# Patient Record
Sex: MALE | Race: BLACK OR AFRICAN AMERICAN | NOT HISPANIC OR LATINO | Employment: PART TIME | URBAN - METROPOLITAN AREA
[De-identification: names, ages, dates, MRNs, and addresses within clinical notes are randomized per-mention and may not be internally consistent; named-entity substitution may affect disease eponyms.]

---

## 2017-01-27 ENCOUNTER — LAB VISIT (OUTPATIENT)
Dept: LAB | Facility: HOSPITAL | Age: 67
End: 2017-01-27
Attending: FAMILY MEDICINE
Payer: COMMERCIAL

## 2017-01-27 DIAGNOSIS — E78.2 MIXED HYPERLIPIDEMIA: ICD-10-CM

## 2017-01-27 LAB
CHOLEST/HDLC SERPL: 6.1 {RATIO}
HDL/CHOLESTEROL RATIO: 16.5 %
HDLC SERPL-MCNC: 200 MG/DL
HDLC SERPL-MCNC: 33 MG/DL
LDLC SERPL CALC-MCNC: 114.2 MG/DL
NONHDLC SERPL-MCNC: 167 MG/DL
TRIGL SERPL-MCNC: 264 MG/DL

## 2017-01-27 PROCEDURE — 36415 COLL VENOUS BLD VENIPUNCTURE: CPT | Mod: PO

## 2017-01-27 PROCEDURE — 80061 LIPID PANEL: CPT

## 2017-02-08 ENCOUNTER — TELEPHONE (OUTPATIENT)
Dept: FAMILY MEDICINE | Facility: CLINIC | Age: 67
End: 2017-02-08

## 2017-02-08 DIAGNOSIS — M10.00 ACUTE IDIOPATHIC GOUT, UNSPECIFIED SITE: Primary | ICD-10-CM

## 2017-02-08 NOTE — TELEPHONE ENCOUNTER
----- Message from Sameera Joseph sent at 2/8/2017  3:55 PM CST -----  Contact: Wife  Diana, wife [phone] or  Shree  [phone], Calling for patient about his gout. Patient has taken his medication for over 3 weeks with no relief in his feet by his big toes and is calling for advise on see possibly a specialist. Please advise. Thanks

## 2017-02-09 RX ORDER — METHYLPREDNISOLONE 4 MG/1
TABLET ORAL
Qty: 21 TABLET | Refills: 0 | Status: SHIPPED | OUTPATIENT
Start: 2017-02-09 | End: 2018-02-01

## 2017-03-30 ENCOUNTER — TELEPHONE (OUTPATIENT)
Dept: FAMILY MEDICINE | Facility: CLINIC | Age: 67
End: 2017-03-30

## 2017-03-30 NOTE — TELEPHONE ENCOUNTER
Wife states that she's had some chest pain the other night and has a few episodes since.     Informed her I would have the nurse call her back as she would be able to get more accurate and relevant information from her.

## 2017-03-30 NOTE — TELEPHONE ENCOUNTER
----- Message from Gemini Dumont sent at 3/30/2017 10:12 AM CDT -----  Contact: [phone]  Wife Diana called stated that she's returning a call/pls call back at [phone]

## 2017-03-30 NOTE — TELEPHONE ENCOUNTER
----- Message from Rossy Banerjee sent at 3/30/2017  9:49 AM CDT -----  Contact: wife- Diana - [phone]  Patient's wife asking for recommendation for patient to see cardiologist.Thanks!

## 2017-03-30 NOTE — TELEPHONE ENCOUNTER
Patient's wife called stating her  has been complaining of chest pains often.  Patient was seen in the office for TIA in 2013.  Advised patient's wife he needs to go to the ER for further evaluation.  Mrs. Heredia agreed and states she will let him know.

## 2017-05-15 DIAGNOSIS — E78.5 HYPERLIPIDEMIA: ICD-10-CM

## 2017-05-15 RX ORDER — OMEGA-3-ACID ETHYL ESTERS 1 G/1
CAPSULE, LIQUID FILLED ORAL
Qty: 360 CAPSULE | Refills: 3 | Status: SHIPPED | OUTPATIENT
Start: 2017-05-15 | End: 2018-05-17 | Stop reason: SDUPTHER

## 2017-05-15 RX ORDER — EZETIMIBE 10 MG/1
TABLET ORAL
Qty: 90 TABLET | Refills: 3 | Status: SHIPPED | OUTPATIENT
Start: 2017-05-15 | End: 2018-05-17 | Stop reason: SDUPTHER

## 2017-07-26 DIAGNOSIS — M10.00 ACUTE IDIOPATHIC GOUT, UNSPECIFIED SITE: Primary | ICD-10-CM

## 2017-07-27 ENCOUNTER — LAB VISIT (OUTPATIENT)
Dept: LAB | Facility: HOSPITAL | Age: 67
End: 2017-07-27
Attending: FAMILY MEDICINE
Payer: COMMERCIAL

## 2017-07-27 DIAGNOSIS — M10.00 ACUTE IDIOPATHIC GOUT, UNSPECIFIED SITE: ICD-10-CM

## 2017-07-27 LAB — URATE SERPL-MCNC: 9 MG/DL

## 2017-07-27 PROCEDURE — 84550 ASSAY OF BLOOD/URIC ACID: CPT

## 2017-07-27 PROCEDURE — 36415 COLL VENOUS BLD VENIPUNCTURE: CPT | Mod: PO

## 2017-07-27 RX ORDER — COLCHICINE 0.6 MG/1
TABLET, FILM COATED ORAL
Qty: 30 TABLET | Refills: 2 | Status: SHIPPED | OUTPATIENT
Start: 2017-07-27 | End: 2017-11-06 | Stop reason: SDUPTHER

## 2017-07-28 ENCOUNTER — TELEPHONE (OUTPATIENT)
Dept: FAMILY MEDICINE | Facility: CLINIC | Age: 67
End: 2017-07-28

## 2017-07-28 DIAGNOSIS — E79.0 HYPERURICEMIA: ICD-10-CM

## 2017-07-28 DIAGNOSIS — M10.00 ACUTE IDIOPATHIC GOUT, UNSPECIFIED SITE: Primary | ICD-10-CM

## 2017-07-28 RX ORDER — ALLOPURINOL 100 MG/1
100 TABLET ORAL DAILY
Qty: 30 TABLET | Refills: 6 | Status: SHIPPED | OUTPATIENT
Start: 2017-07-28 | End: 2017-09-13 | Stop reason: SDUPTHER

## 2017-07-28 NOTE — TELEPHONE ENCOUNTER
Patient notified of lab results and to start allopurinol. Patient voiced understanding and is schedule for repeat labs in 6 weeks.

## 2017-07-28 NOTE — TELEPHONE ENCOUNTER
----- Message from Brendan Frank Jr., MD sent at 7/28/2017  7:09 AM CDT -----  Uric acid elevated to 9.0. Patient needs a medication to take daily to decrease uric acid, and colcrys is more for acute flare-ups. Recommend allopurinol daily to decrease uric acid. Recommend starting at 100 mg daily then check uric acid again in 6-8 weeks.

## 2017-08-29 RX ORDER — INDOMETHACIN 25 MG/1
CAPSULE ORAL
Qty: 30 CAPSULE | Refills: 2 | Status: SHIPPED | OUTPATIENT
Start: 2017-08-29 | End: 2022-08-02

## 2017-09-08 ENCOUNTER — LAB VISIT (OUTPATIENT)
Dept: LAB | Facility: HOSPITAL | Age: 67
End: 2017-09-08
Attending: FAMILY MEDICINE
Payer: MEDICARE

## 2017-09-08 DIAGNOSIS — M10.00 ACUTE IDIOPATHIC GOUT, UNSPECIFIED SITE: ICD-10-CM

## 2017-09-08 LAB — URATE SERPL-MCNC: 7.1 MG/DL

## 2017-09-08 PROCEDURE — 84550 ASSAY OF BLOOD/URIC ACID: CPT

## 2017-09-08 PROCEDURE — 36415 COLL VENOUS BLD VENIPUNCTURE: CPT | Mod: PO

## 2017-09-13 DIAGNOSIS — E79.0 HYPERURICEMIA: Primary | ICD-10-CM

## 2017-09-13 RX ORDER — ALLOPURINOL 100 MG/1
200 TABLET ORAL DAILY
Qty: 60 TABLET | Refills: 6 | Status: SHIPPED | OUTPATIENT
Start: 2017-09-13 | End: 2018-02-20 | Stop reason: SDUPTHER

## 2017-09-14 ENCOUNTER — TELEPHONE (OUTPATIENT)
Dept: FAMILY MEDICINE | Facility: CLINIC | Age: 67
End: 2017-09-14

## 2017-09-14 NOTE — TELEPHONE ENCOUNTER
----- Message from Brendan Frank Jr., MD sent at 9/13/2017  6:24 PM CDT -----  Uric acid improved with allopurinol 100 mg daily, but still slightly elevated at 7.1 (normal 3.4-7.0). Increase allopurinol to 200 mg daily and recheck uric acid before next visit. New rx sent to pharmacy.

## 2017-09-14 NOTE — TELEPHONE ENCOUNTER
Results given to patient.  Informed patient of the medication dosage change.  Patient will call back in Jan to schedule his lab

## 2017-11-06 DIAGNOSIS — M10.00 ACUTE IDIOPATHIC GOUT, UNSPECIFIED SITE: ICD-10-CM

## 2017-11-07 RX ORDER — COLCHICINE 0.6 MG/1
0.6 TABLET ORAL DAILY
Qty: 90 TABLET | Refills: 3 | Status: SHIPPED | OUTPATIENT
Start: 2017-11-07 | End: 2018-11-20 | Stop reason: SDUPTHER

## 2018-01-30 ENCOUNTER — DOCUMENTATION ONLY (OUTPATIENT)
Dept: FAMILY MEDICINE | Facility: CLINIC | Age: 68
End: 2018-01-30

## 2018-01-30 NOTE — PROGRESS NOTES
Pre-Visit Chart Review  For Appointment Scheduled on (date) 2/1/18    Health Maintenance Due   Topic Date Due    TETANUS VACCINE  03/20/1970    Zoster Vaccine  03/20/2012    Pneumococcal (65+) (1 of 2 - PCV13) 03/20/2017    Abdominal Aortic Aneurysm Screening  03/20/2017    Influenza Vaccine  08/01/2017

## 2018-02-01 ENCOUNTER — LAB VISIT (OUTPATIENT)
Dept: LAB | Facility: HOSPITAL | Age: 68
End: 2018-02-01
Attending: FAMILY MEDICINE
Payer: MEDICARE

## 2018-02-01 ENCOUNTER — OFFICE VISIT (OUTPATIENT)
Dept: FAMILY MEDICINE | Facility: CLINIC | Age: 68
End: 2018-02-01
Payer: MEDICARE

## 2018-02-01 VITALS
TEMPERATURE: 98 F | DIASTOLIC BLOOD PRESSURE: 74 MMHG | WEIGHT: 175.69 LBS | HEART RATE: 64 BPM | BODY MASS INDEX: 28.36 KG/M2 | SYSTOLIC BLOOD PRESSURE: 117 MMHG

## 2018-02-01 DIAGNOSIS — E78.2 MIXED HYPERLIPIDEMIA: Primary | ICD-10-CM

## 2018-02-01 DIAGNOSIS — E79.0 HYPERURICEMIA: ICD-10-CM

## 2018-02-01 DIAGNOSIS — Z12.5 SPECIAL SCREENING FOR MALIGNANT NEOPLASM OF PROSTATE: ICD-10-CM

## 2018-02-01 DIAGNOSIS — J30.2 CHRONIC SEASONAL ALLERGIC RHINITIS DUE TO OTHER ALLERGEN: ICD-10-CM

## 2018-02-01 DIAGNOSIS — Z23 NEED FOR PROPHYLACTIC VACCINATION AGAINST STREPTOCOCCUS PNEUMONIAE (PNEUMOCOCCUS): ICD-10-CM

## 2018-02-01 DIAGNOSIS — E78.2 MIXED HYPERLIPIDEMIA: ICD-10-CM

## 2018-02-01 LAB
ALBUMIN SERPL BCP-MCNC: 3.8 G/DL
ALP SERPL-CCNC: 71 U/L
ALT SERPL W/O P-5'-P-CCNC: 57 U/L
ANION GAP SERPL CALC-SCNC: 8 MMOL/L
AST SERPL-CCNC: 54 U/L
BILIRUB SERPL-MCNC: 0.5 MG/DL
BUN SERPL-MCNC: 14 MG/DL
CALCIUM SERPL-MCNC: 10 MG/DL
CHLORIDE SERPL-SCNC: 104 MMOL/L
CHOLEST SERPL-MCNC: 224 MG/DL
CHOLEST/HDLC SERPL: 5.6 {RATIO}
CO2 SERPL-SCNC: 28 MMOL/L
COMPLEXED PSA SERPL-MCNC: 8.4 NG/ML
CREAT SERPL-MCNC: 1.3 MG/DL
EST. GFR  (AFRICAN AMERICAN): >60 ML/MIN/1.73 M^2
EST. GFR  (NON AFRICAN AMERICAN): 57.3 ML/MIN/1.73 M^2
GLUCOSE SERPL-MCNC: 94 MG/DL
HDLC SERPL-MCNC: 40 MG/DL
HDLC SERPL: 17.9 %
LDLC SERPL CALC-MCNC: 140.2 MG/DL
NONHDLC SERPL-MCNC: 184 MG/DL
POTASSIUM SERPL-SCNC: 4.7 MMOL/L
PROT SERPL-MCNC: 7.7 G/DL
SODIUM SERPL-SCNC: 140 MMOL/L
TRIGL SERPL-MCNC: 219 MG/DL
URATE SERPL-MCNC: 5.5 MG/DL

## 2018-02-01 PROCEDURE — 80053 COMPREHEN METABOLIC PANEL: CPT

## 2018-02-01 PROCEDURE — 84153 ASSAY OF PSA TOTAL: CPT

## 2018-02-01 PROCEDURE — G0008 ADMIN INFLUENZA VIRUS VAC: HCPCS | Mod: PBBFAC,PO

## 2018-02-01 PROCEDURE — 90662 IIV NO PRSV INCREASED AG IM: CPT | Mod: PBBFAC,PO

## 2018-02-01 PROCEDURE — 99999 PR PBB SHADOW E&M-EST. PATIENT-LVL III: CPT | Mod: PBBFAC,,, | Performed by: FAMILY MEDICINE

## 2018-02-01 PROCEDURE — 99214 OFFICE O/P EST MOD 30 MIN: CPT | Mod: S$PBB,,, | Performed by: FAMILY MEDICINE

## 2018-02-01 PROCEDURE — 84550 ASSAY OF BLOOD/URIC ACID: CPT

## 2018-02-01 PROCEDURE — 99213 OFFICE O/P EST LOW 20 MIN: CPT | Mod: PBBFAC,PO,25 | Performed by: FAMILY MEDICINE

## 2018-02-01 PROCEDURE — 80061 LIPID PANEL: CPT

## 2018-02-01 PROCEDURE — 36415 COLL VENOUS BLD VENIPUNCTURE: CPT | Mod: PO

## 2018-02-01 RX ORDER — CETIRIZINE HYDROCHLORIDE 10 MG/1
10 TABLET ORAL NIGHTLY
COMMUNITY

## 2018-02-04 NOTE — PROGRESS NOTES
Subjective:       Patient ID: Shree Heredia is a 67 y.o. male.    Chief Complaint: Hyperlipidemia and Hyperuricemia    Patient presents here for annual follow-up of hyperlipidemia and hyperuricemia.  He has been feeling well and he has no complaints at this time.  He is due for his blood work and he is fasting this morning.  He has been compliant with his low-fat low-cholesterol diet as well as his present dose of Zetia and Lovaza.  He does exercise on a regular basis and his weight is stable.  As far as his hyperuricemia, his dosage of allopurinol was increased from 100 mg daily to 200 mg daily about 4 months ago after his repeat uric acid came back at 7.1, which is just above the upper limits of normal of 7.0.  He is tolerating allopurinol well.  Also since he has been on his allopurinol, he has not had any gout attacks.  As for screening, he does need his flu vaccine as well as needs to start his pneumococcal series since he is now 67 years old.  He declines shingles vaccine.  He also needs a tetanus vaccine but this will come in a later date after he has had his flu vaccine and pneumonia vaccine.      Hyperlipidemia   This is a chronic problem. The problem is controlled. Recent lipid tests were reviewed and are normal. Pertinent negatives include no chest pain or shortness of breath. Current antihyperlipidemic treatment includes ezetimibe. The current treatment provides moderate improvement of lipids. There are no compliance problems.  Risk factors for coronary artery disease include dyslipidemia and male sex.     Review of Systems   Constitutional: Negative for chills, fatigue, fever and unexpected weight change.   HENT: Negative for congestion, ear pain, postnasal drip and sore throat.    Eyes: Negative for pain and visual disturbance.   Respiratory: Negative for cough and shortness of breath.    Cardiovascular: Negative for chest pain and palpitations.   Gastrointestinal: Negative for abdominal pain,  constipation, diarrhea, nausea and vomiting.   Endocrine: Negative for polydipsia and polyuria.   Genitourinary: Negative for difficulty urinating, dysuria, flank pain and frequency.   Musculoskeletal: Negative for arthralgias and back pain.   Skin: Negative for pallor and rash.   Neurological: Negative for dizziness, syncope, light-headedness and headaches.   Hematological: Negative for adenopathy. Does not bruise/bleed easily.   Psychiatric/Behavioral: Negative for sleep disturbance. The patient is not nervous/anxious.        Objective:      Physical Exam   Constitutional: He is oriented to person, place, and time. He appears well-developed and well-nourished. No distress.   HENT:   Head: Normocephalic and atraumatic.   Right Ear: External ear normal.   Left Ear: External ear normal.   Nose: Nose normal.   Mouth/Throat: Oropharynx is clear and moist.   Eyes: Conjunctivae and EOM are normal. Pupils are equal, round, and reactive to light.   Neck: Normal range of motion. Neck supple. No thyromegaly present.   Cardiovascular: Normal rate, regular rhythm, normal heart sounds and intact distal pulses.    No murmur heard.  Pulmonary/Chest: Effort normal and breath sounds normal. He has no wheezes. He has no rales.   Abdominal: Soft. Bowel sounds are normal. There is no tenderness. There is no rebound and no guarding.   Genitourinary: Rectum normal and prostate normal.   Musculoskeletal: Normal range of motion. He exhibits no edema.   Lymphadenopathy:     He has no cervical adenopathy.   Neurological: He is alert and oriented to person, place, and time. He has normal reflexes. No cranial nerve deficit.   Skin: Skin is warm and dry. No rash noted. No erythema.   Psychiatric: He has a normal mood and affect. His behavior is normal.   Vitals reviewed.      Assessment:       1. Mixed hyperlipidemia    2. Hyperuricemia    3. Chronic seasonal allergic rhinitis due to other allergen    4. Need for prophylactic vaccination  against Streptococcus pneumoniae (pneumococcus)    5. Special screening for malignant neoplasm of prostate        Plan:       1.  CMP, lipid profile, PSA, uric acid today  2.  Pneumovax no 1 today  3.  Flu vaccine today  4.  Continue low-fat low-cholesterol diet as well as exercise for optimal weight  5.  Continue present medication for hyperlipidemia and hyperuricemia as these are controlled  6.  Follow-up with me in one year or when necessary

## 2018-02-14 DIAGNOSIS — R97.20 ELEVATED PSA: Primary | ICD-10-CM

## 2018-02-14 DIAGNOSIS — R74.01 TRANSAMINASEMIA: ICD-10-CM

## 2018-02-14 RX ORDER — DOXYCYCLINE 100 MG/1
100 CAPSULE ORAL EVERY 12 HOURS
Qty: 28 CAPSULE | Refills: 0 | Status: SHIPPED | OUTPATIENT
Start: 2018-02-14 | End: 2018-02-28

## 2018-02-20 DIAGNOSIS — E79.0 HYPERURICEMIA: ICD-10-CM

## 2018-02-20 RX ORDER — ALLOPURINOL 100 MG/1
TABLET ORAL
Qty: 60 TABLET | Refills: 11 | Status: SHIPPED | OUTPATIENT
Start: 2018-02-20 | End: 2019-02-14 | Stop reason: SDUPTHER

## 2018-03-02 ENCOUNTER — LAB VISIT (OUTPATIENT)
Dept: LAB | Facility: HOSPITAL | Age: 68
End: 2018-03-02
Attending: FAMILY MEDICINE
Payer: MEDICARE

## 2018-03-02 DIAGNOSIS — R97.20 ELEVATED PSA: ICD-10-CM

## 2018-03-02 DIAGNOSIS — R74.01 TRANSAMINASEMIA: ICD-10-CM

## 2018-03-02 LAB
ALBUMIN SERPL BCP-MCNC: 3.4 G/DL
ALP SERPL-CCNC: 54 U/L
ALT SERPL W/O P-5'-P-CCNC: 34 U/L
ANION GAP SERPL CALC-SCNC: 8 MMOL/L
AST SERPL-CCNC: 50 U/L
BILIRUB SERPL-MCNC: 0.5 MG/DL
BUN SERPL-MCNC: 13 MG/DL
CALCIUM SERPL-MCNC: 9.4 MG/DL
CHLORIDE SERPL-SCNC: 107 MMOL/L
CO2 SERPL-SCNC: 27 MMOL/L
COMPLEXED PSA SERPL-MCNC: 5.3 NG/ML
CREAT SERPL-MCNC: 1.2 MG/DL
EST. GFR  (AFRICAN AMERICAN): >60 ML/MIN/1.73 M^2
EST. GFR  (NON AFRICAN AMERICAN): >60 ML/MIN/1.73 M^2
GLUCOSE SERPL-MCNC: 83 MG/DL
POTASSIUM SERPL-SCNC: 4.4 MMOL/L
PROT SERPL-MCNC: 6.9 G/DL
SODIUM SERPL-SCNC: 142 MMOL/L

## 2018-03-02 PROCEDURE — 84153 ASSAY OF PSA TOTAL: CPT

## 2018-03-02 PROCEDURE — 36415 COLL VENOUS BLD VENIPUNCTURE: CPT | Mod: PO

## 2018-03-02 PROCEDURE — 80053 COMPREHEN METABOLIC PANEL: CPT

## 2018-03-05 DIAGNOSIS — R97.20 ELEVATED PSA: Primary | ICD-10-CM

## 2018-03-06 NOTE — PROGRESS NOTES
[facility] Note  Staff: CHARLINE Lima-C    PCP: Dr. Brendan Frank    Chief Complaint: Elevated PSA level    Subjective:        HPI: Shree Heredia is a 67 y.o. male NEW PATIENT to Urology clinic today presents with hx of recent onset of elevated psa levels.  Initially on 02/01/18 pt's PCP office did a PSA level which was 8.4 and therefore pt was placed on Doxycycline 100 mg 1 po BID x 14 days.  PSA level was then repeated and was found to be decreased from 8.4 but remained elevated at 5.3 therefore pt is here today for further evaluation.     History (Per [facility] Documents):    The patient was seen by Dr. Amarjit Youngblood-Urologist in 2005 for painless gross hematuria.  On 08/16/2005 he had a Cystoscopy and bilateral retrograde pyelograms which showed normal findings.    Questions asked the pt during ov today:  Urgency: None, urge incontinence?   NTF: 1-2x night, DTF: None  Dysuria: No  Gross Hematuria:Yes - over 10 years ago was evaluated and had cystoscopy  Straining:No, Hesistancy:No, Intermittency:No, Weak stream:No  ED:Yes - Pt currently on Cialis 5 mg prn ED prescribed by Dr. Frank  STDs in past: No  Vasectomy: None    Last PSA Screening:   Lab Results   Component Value Date    PSA 8.4 (H) 02/01/2018    PSA 2.5 10/13/2016    PSA 1.5 03/24/2015    PSADIAG 5.3 (H) 03/02/2018     REVIEW OF SYSTEMS:  Review of Systems   Constitutional: Negative for chills, diaphoresis, fever and weight loss.   HENT: Negative for congestion, hearing loss, nosebleeds and sore throat.    Eyes: Negative for blurred vision and pain.        Wears glasses   Respiratory: Negative for cough and wheezing.    Cardiovascular: Negative for chest pain, palpitations and leg swelling.   Gastrointestinal: Negative for abdominal pain, heartburn, nausea and vomiting.   Genitourinary: Negative for dysuria, flank pain, frequency, hematuria and urgency.        +Elevated PSA level  +Hx of ED-Takes Cialis 5 mg prn  prescribed by Monika.   Musculoskeletal: Negative for back pain, joint pain, myalgias and neck pain.   Skin: Negative for itching and rash.   Neurological: Negative for dizziness, tremors, sensory change, seizures, loss of consciousness, weakness and headaches.   Endo/Heme/Allergies: Does not bruise/bleed easily.   Psychiatric/Behavioral: Negative for depression and suicidal ideas. The patient is not nervous/anxious.      Physical Exam    PMHx:  Past Medical History:   Diagnosis Date    Allergic rhinitis, seasonal     Gout attack     Hyperlipidemia      Kidney stones: No  Cataracts? None    PSHx:  Past Surgical History:   Procedure Laterality Date    BACK SURGERY  01/29/2016    INGUINAL HERNIA REPAIR       Stents/Valves/Foreign Bodies: No  Cardiac Evaluation: No    Screening Studies  Colonoscopy: +5 Years ago, one benign polyp.    Fam Hx:   malignancies: Yes - Father was diagnosed at age 85 with bladder cancer; Father's brother had prostate cancer at age of early 50s, past away from it.    kidney stones: No     Soc Hx:  + Former tobacco use, quit 14 years ago, smoked cigarettes for 20 years prior.    Allergies:  No known drug allergies    Medications: reviewed   Anticoagulation: Yes - ASA 81 mg one tablet daily.    Objective:     Vitals:    03/07/18 1306   BP: 119/75   Pulse: 62   Temp: 98 °F (36.7 °C)     General:WDWN in NAD  Eyes: PERRLA, normal conjunctiva, wears glasses  Respiratory: no increased work on breathing, clear to auscultation  Cardiovascular: regular rate and rhythm. No obvious extremity edema.  GI: palpation of masses. No tenderness. No hepatosplenomegaly to palpation.  Musculoskeletal: normal range of motion of bilateral upper extremities. Normal muscle strength and tone.  Skin: no obvious rashes or lesions. No tightening of skin noted.  Neurologic: CN grossly normal. Normal sensation.   Psychiatric: awake, alert and oriented x 3. Mood and affect normal. Cooperative.    :  Inspection of  anus and perineum normal  No scrotal rashes, cysts or lesions  Epididymis normal in size, no tenderness  Testes normal and size, equal size bilaterally, no masses  Urethral meatus normal without discharge  Penis is circumcised,    JAZZY: Mildly enlarged prostate gland without masses, tenderness. SV not palpable. Normal sphincter tone. No hemhorroids.  No bilateral inguinal hernias noted     PVR by bladder scan performed by me today: 15 mL*    LABS REVIEW:  UA today:  Color:Clear, Yellow  Spec. Grav.  1.025  PH  5.0  Negative for leukocytes, nitrates, protein, glucose, ketones, urobili, bili, and blood.    Cr:   Lab Results   Component Value Date    CREATININE 1.2 03/02/2018     Assessment:       1. Elevated PSA    2. Benign prostatic hyperplasia without lower urinary tract symptoms    3. Family history of bladder cancer    4. Family history of prostate cancer          Plan:   Elevated PSA level with mild BPH with no LUTS:    Per AUA Guidelines, due to pt's family history of prostate and bladder cancer, former tobacco user, pt currently having no lower urinary tract symptoms in collaboration with the elevation in PSA level-- I am referring this patient to follow-up up with one of Urologist for their opinion on scheduling Prostate US with biopsies in the near future vs. Other treatment options (Repeating PSA in few months, monitoring, etc.)    [facility]: Declined    DERIK Montgomery

## 2018-03-07 ENCOUNTER — OFFICE VISIT (OUTPATIENT)
Dept: UROLOGY | Facility: CLINIC | Age: 68
End: 2018-03-07
Payer: MEDICARE

## 2018-03-07 VITALS
WEIGHT: 174.19 LBS | HEIGHT: 66 IN | TEMPERATURE: 98 F | DIASTOLIC BLOOD PRESSURE: 75 MMHG | SYSTOLIC BLOOD PRESSURE: 119 MMHG | BODY MASS INDEX: 27.99 KG/M2 | HEART RATE: 62 BPM

## 2018-03-07 DIAGNOSIS — Z80.52 FAMILY HISTORY OF BLADDER CANCER: ICD-10-CM

## 2018-03-07 DIAGNOSIS — Z80.42 FAMILY HISTORY OF PROSTATE CANCER: ICD-10-CM

## 2018-03-07 DIAGNOSIS — R97.20 ELEVATED PSA: Primary | ICD-10-CM

## 2018-03-07 DIAGNOSIS — N40.0 BENIGN PROSTATIC HYPERPLASIA WITHOUT LOWER URINARY TRACT SYMPTOMS: ICD-10-CM

## 2018-03-07 LAB
BILIRUB SERPL-MCNC: NORMAL MG/DL
BLOOD URINE, POC: NORMAL
COLOR, POC UA: NORMAL
GLUCOSE UR QL STRIP: NORMAL
KETONES UR QL STRIP: NORMAL
LEUKOCYTE ESTERASE URINE, POC: NORMAL
NITRITE, POC UA: NORMAL
PH, POC UA: 5
PROTEIN, POC: NORMAL
SPECIFIC GRAVITY, POC UA: 1025
UROBILINOGEN, POC UA: NORMAL

## 2018-03-07 PROCEDURE — 51798 US URINE CAPACITY MEASURE: CPT | Mod: PBBFAC,PN | Performed by: NURSE PRACTITIONER

## 2018-03-07 PROCEDURE — 81001 URINALYSIS AUTO W/SCOPE: CPT | Mod: PBBFAC,PN | Performed by: NURSE PRACTITIONER

## 2018-03-07 PROCEDURE — 99214 OFFICE O/P EST MOD 30 MIN: CPT | Mod: PBBFAC,PN,25 | Performed by: NURSE PRACTITIONER

## 2018-03-07 PROCEDURE — 99204 OFFICE O/P NEW MOD 45 MIN: CPT | Mod: S$PBB,,, | Performed by: NURSE PRACTITIONER

## 2018-03-07 PROCEDURE — 99999 PR PBB SHADOW E&M-EST. PATIENT-LVL IV: CPT | Mod: PBBFAC,,, | Performed by: NURSE PRACTITIONER

## 2018-03-07 NOTE — LETTER
March 7, 2018      Brendan Frank Jr., MD  [address] [facility] [address]           [place] [facility]  [address] [place] [address] Dr. Nixon [address] [place] [id number]  Phone: [phone]  Fax: [phone]          Patient: Shree Heredia   MR Number: [id number]   YOB: 1950   Date of Visit: 3/7/2018       Dear Dr. Brendan Frank Jr.:    Thank you for referring Shree Heredia to me for evaluation. Attached you will find relevant portions of my assessment and plan of care.    If you have questions, please do not hesitate to call me. I look forward to following Shree Heredia along with you.    Sincerely,    Michelle Onofre, [facility]    Enclosure  CC:  No Recipients    If you would like to receive this communication electronically, please contact [email or web address] or [phone] to request more information on [employer] Link access.    For providers and/or their staff who would like to refer a patient to [facility], please contact us through our one-stop-shop provider referral line, [facility], at [phone].    If you feel you have received this communication in error or would no longer like to receive these types of communications, please e-mail [email or web address]

## 2018-03-07 NOTE — PATIENT INSTRUCTIONS
Prostate-Specific Antigen (PSA)  Does this test have other names?  PSA  What is this test?  This test measures the level of prostate-specific antigen (PSA) in your blood.  The cells of the prostate gland make the protein called PSA. Men normally have low levels of PSA. If your PSA levels start to rise, it could mean you have prostate cancer, benign prostate conditions, inflammation, or an infection.  PSA testing is controversial because the U.S. Preventative Services Task Force discourages men who don't have any symptoms of prostate cancer from being screened. The task force says that PSA test results can lead to treating small cancers that would never become life-threatening.  But the American Cancer Society believes men should be told the risks and benefits of PSA testing and allowed to make their own decision about if and when to be screened.  The American Urologic Society says that PSA screening is most important between the ages of 55 and 69. If you have a brother or father with prostate cancer or you are , you should start testing at age 40.   Why do I need this test?  You may have this test if you are 50 or older and your healthcare provider wants to screen you for prostate cancer. Some providers recommend screening at age 40 or 45 if you have a family history of prostate cancer or other risk factors.  You may also have this test if you have already been diagnosed with prostate cancer, so that your healthcare provider can monitor your treatment and see whether your cancer has come back.  What other tests might I have along with this test?  Your healthcare provider may also do a digital rectal exam (JAZZY). A JAZZY is a physical exam of the prostate, not a lab test. For the exam, your provider will place a gloved finger in your rectum and feel the prostate to check for any bumps or abnormal areas. The FDA recommends that a JAZZY be done along with a PSA test.  What do my test results mean?  Many  things may affect your lab test results. These include the method each lab uses to do the test. Even if your test results are different from the normal value, you may not have a problem. To learn what the results mean for you, talk with your healthcare provider.  Results are given in nanograms per milliliter ng/mL. Normal results are below 4.0 ng/mL. A rising PSA may mean that you have cancer. But the PSA results alone won't tell your healthcare provider whether it's cancer or a benign prostate condition. If your provider suspects cancer, he or she will likely suggest that you have a biopsy of the prostate to make the diagnosis.  How is this test done?  The test requires a blood sample, which is drawn through a needle from a vein in your arm.  Does this test pose any risks?  Taking a blood sample with a needle carries risks that include bleeding, infection, bruising, or feeling dizzy. When the needle pricks your arm, you may feel a slight stinging sensation or pain. Afterward, the site may be slightly sore.  What might affect my test results?  An infection can affect your results, as can certain medicines. Riding a bicycle and ejaculation may also affect your results.  How do I get ready for this test?  You may need to abstain from sex and not ride a bicycle within 1 to 2 days of the test. In addition, be sure your healthcare provider knows about all medicines, herbs, vitamins, and supplements you are taking. This includes medicines that don't need a prescription and any illicit drugs you may use.     © 1241-9673 [employer] [address] [place] [address], [place] [address]. All rights reserved. This information is not intended as a substitute for professional medical care. Always follow your healthcare professional's instructions.        PSA Test     PSA is a simple blood test.   The prostate specific antigen (PSA) test is a blood test. It can help find prostate cancer. PSA is a substance in semen. Its made  by the prostate. It is normal for some PSA to leak from the prostate into the bloodstream. But sometimes, more than a normal amount of PSA gets into the blood. The PSA test measures the amount of PSA in the blood. If the test shows high blood level of PSA, other tests are needed to help find the cause.  Possible causes of increased PSA  Several things can cause extra PSA to enter the blood, such as:  · Prostate cancer  · Prostate infection (prostatitis)  · Enlarged prostate not due to cancer (benign prostatic hyperplasia, or BPH)  · Prostate massage  · Prostate biopsy  Why a PSA test is done  A PSA test can be done to check for prostate cancer. But the PSA test by itself cant tell for sure whether or not a man has prostate cancer. And not all health care providers agree if men should have PSA tests to screen for prostate cancer. The American Cancer Society and many other organizations advise men talk with their health care provider about if prostate cancer screening is right for them. Talk with your health care provider about the PSA test beginning around age 50, or earlier if youre at higher risk.  A PSA test may also be done if a problem is found during a routine prostate exam. And it may be done if you have symptoms that suggest that you have a prostate problem. You may need a PSA if you have symptoms such as:  · Needing to urinate more often  · Waking often to urinate at night  · Having to strain when urinating  · Seeing blood in your urine  · Having pain when urinating  How a PSA test is done  Before a PSA test, you may have a digital rectal exam (JAZZY) of the prostate. For this exam, the health care provider inserts a lubricated, gloved finger into the rectum to feel the prostate. Then youll be sent to have your blood drawn for the PSA test. The test may be done in the health care providers office. Or it may be at a lab, clinic, or hospital. Blood is taken from your arm and sent to a lab to be  tested.  Getting your results  The time it takes to get your test results varies. Ask your health care provider when you can expect your results. You and your health care provider will discuss the results. A normal range for your PSA depends on a number of factors. These include your age and the size of your prostate. They also include your risk factors for cancer, your symptoms, and the results of any previous PSA tests youve had. All of these things are taken into account when your PSA tests numbers are assessed.  If you're at higher risk for prostate cancer  If you are -American or have a family history of prostate cancer, talk with your health care provider about PSA tests by age 40 to 45.   Date Last Reviewed: 3/24/2015  © 2910-1295 [employer]. [address] [place] [address], [place] [address]. All rights reserved. This information is not intended as a substitute for professional medical care. Always follow your healthcare professional's instructions.        Prostate Ultrasound    An ultrasound is a type of imaging test. It can be used to look at the prostate. Its a safe procedure that does not use radiation. It uses high-frequency sound waves.  When ultrasound is used  You may need ultrasound on your prostate if your health care provider thinks you may have prostate cancer. An ultrasound is most often done after a digital rectal exam (JAZZY) or a PSA blood test. These are screening tests for prostate cancer.  How ultrasound helps  Ultrasound uses sound waves to create an image of the prostate gland. It helps your health care provider see if the gland looks abnormal. It can also be used to help guide a biopsy. This is a procedure that removes tiny pieces of tissue to test. A prostate biopsy is done with a needle. Ultrasound helps your health care provider see where to put the needle.  Before the procedure  You may be told to use an enema or suppository the night or morning before. This is to clear  your rectum of stool. The test is often done in your health care provider's office. It usually takes less than 15 minutes. If a biopsy may be done, youll be given antibiotics before and after the test.  During the procedure  What happens during the procedure:  · You lie on your side with your knees bent or with your feet in stirrups.   · A disposable cover is put on the ultrasound probe. The probe is tube-shaped and a bit larger than a thumb. A jelly is put on the probe to lubricate it.  · Your health care provider gently inserts the probe into your rectum. This may cause some discomfort.  · The probe emits sound waves. These create an image of your prostate on a computer screen. Your health care provider looks at the image. The size and shape of your prostate are checked for problems.  · When the test is done, the probe is removed.  · You can go home the same day, and go back to your normal activities.  Date Last Reviewed: 3/24/2015  © 2725-8187 [employer] [address] [place] [address]. All rights reserved. This information is not intended as a substitute for professional medical care. Always follow your healthcare professional's instructions.        Prostate Biopsy    Cancer occurs when abnormal cells form a tumor. A tumor is a lump of cells that grow uncontrolled. Initial tests that may indicate cancer of the prostate include a digital rectal exam, a PSA (prostate specific antigen blood test), ultrasound, and others. A core needle biopsy will be done if your healthcare provider thinks you have prostate cancer. A thin needle is used to remove small samples of prostate tissue. Usually multiple biopsies are taken. These samples are checked for cancer.  Taking tissue samples  A biopsy takes about 15 to 20 minutes. You may be given an enema or suppository before the biopsy to clear the bowels. Antibiotics are given at least an hour before the biopsy. During the procedure:  · You will be  given antibiotics to prevent infection.  · You may be given a sedative, local pain killer, or pain medicine.  · A small, ultrasound  probe is put into the rectum as you lie on your side. A picture of your prostate can then be seen on a monitor. This is called a transrectal ultrasound (TRUS).  · Your healthcare provider will use the TRUS picture as a guide. He or she will use a thin needle to remove tiny tissue samples from some sites in the prostate.  · These tissue samples are sent to the pathology department. They are looked at under a microscope so a diagnosis can be made.   Risks and complications of core needle biopsy  · Infection  · Blood in urine, stool, or semen  · Pain  · The biopsy misses the tumor   Home care  You may have had the biopsy done through your rectum, your urethra, or through the skin between your scrotum and rectum. Your healthcare provider will tell you what to do after the biopsy. These instructions are based on your health condition, the type of biopsy, and your providers practices.  · Your provider may give you pain medicine such as acetaminophen or ibuprofen for discomfort or pain. Follow your providers instructions for taking these medicines. Dont take aspirin or ibuprofen after the procedure. If you have a chronic liver or kidney disease, talk with your provider before taking these medicines. Also talk with your provider if youve had a stomach ulcer or gastrointestinal bleeding. Let your provider know all the medicines you currently take.  · You may need to take antibiotics for 1 to 2 days. This will help prevent an infection. Signs of an infection include chills, pain, or fever.  · You may be told to drink 8 ounces of water every 30 minutes for 2 hours. This will help ease any discomfort. You can also take a warm bath or put a warm, damp washcloth over your urethra to help ease the pain.  · You may see minor bleeding after the procedure. This is normal and usually needs no  treatment. You may see blood in your urine or semen (rust color). You may also have light bleeding from your rectum if you have hemorrhoids.  · Your provider will tell you when you can go back to your normal activities. This includes sex, exercise, and straining physically. Discuss these with your provider.  When to seek medical advice  Call your healthcare provider right away if any of these occur:  · You arent able to urinate, or see that you have less flow of urine  · Chills and fever of 100.4°F (38°C)    · Severe pain  · Signs of an infection that is getting worse. These include worsening pain, pain in your side under the rib cage or in the low back, or foul-smelling urine.  · Blood clots or bright red blood in your stool or urine  · You feel confused or very tired  Date Last Reviewed: 1/1/2017  © 7911-4946 [employer]. [address] [place] [address], [place] [address]. All rights reserved. This information is not intended as a substitute for professional medical care. Always follow your healthcare professional's instructions.

## 2018-03-29 ENCOUNTER — OFFICE VISIT (OUTPATIENT)
Dept: UROLOGY | Facility: CLINIC | Age: 68
End: 2018-03-29
Payer: MEDICARE

## 2018-03-29 VITALS
WEIGHT: 174.19 LBS | TEMPERATURE: 98 F | BODY MASS INDEX: 27.99 KG/M2 | HEIGHT: 66 IN | HEART RATE: 75 BPM | DIASTOLIC BLOOD PRESSURE: 79 MMHG | RESPIRATION RATE: 18 BRPM | SYSTOLIC BLOOD PRESSURE: 120 MMHG

## 2018-03-29 DIAGNOSIS — R97.20 ELEVATED PSA: Primary | ICD-10-CM

## 2018-03-29 LAB
BILIRUB SERPL-MCNC: NORMAL MG/DL
BLOOD URINE, POC: NORMAL
COLOR, POC UA: CLEAR
GLUCOSE UR QL STRIP: NORMAL
KETONES UR QL STRIP: NORMAL
LEUKOCYTE ESTERASE URINE, POC: NORMAL
NITRITE, POC UA: NORMAL
PH, POC UA: 5
PROTEIN, POC: NORMAL
SPECIFIC GRAVITY, POC UA: 1.02
UROBILINOGEN, POC UA: NORMAL

## 2018-03-29 PROCEDURE — 99213 OFFICE O/P EST LOW 20 MIN: CPT | Mod: PBBFAC,PN | Performed by: UROLOGY

## 2018-03-29 PROCEDURE — 99213 OFFICE O/P EST LOW 20 MIN: CPT | Mod: S$PBB,,, | Performed by: UROLOGY

## 2018-03-29 PROCEDURE — 99999 PR PBB SHADOW E&M-EST. PATIENT-LVL III: CPT | Mod: PBBFAC,,, | Performed by: UROLOGY

## 2018-03-29 PROCEDURE — 81002 URINALYSIS NONAUTO W/O SCOPE: CPT | Mod: PBBFAC,PN | Performed by: UROLOGY

## 2018-03-29 NOTE — PROGRESS NOTES
Subjective:       Patient ID: Shree Heredia is a 68 y.o. male.    Chief Complaint:   OFFICE NOTE    CHIEF COMPLAINT:  Elevated PSA.    HISTORY OF PRESENT ILLNESS:  Mr. Heredia is a 68-year-old male, a patient of   Dr. Frank, who was found to have a PSA level of 8.4.  He was given a course of   doxycycline 100 mg twice a day for two weeks, and a repeat PSA came down to   5.3.  The patient has been referred to our clinic for further evaluation and   possible treatment.    The patient referred to have mild lower urinary tract symptom with nocturia x2.    Denies dysuria or hematuria.  The flow is adequate and he feels like he empties   the bladder most of the time, on occasion has interrupted voiding.    The family history is significant.  His uncle from the father's side suffered of   prostate cancer and his father suffered of bladder cancer.    The past medical and the past surgical history, the current medications and the   allergies are well documented in the medical record, including medications, and   all these were reviewed by me during this visit.    The urinalysis today is clear.  The last PSA was on 03/02/2018, 5.3.      EOR/HN  dd: 03/29/2018 10:01:54 (CDT)  td: 03/30/2018 00:40:25 (CDT)  Doc ID   #[id number]  Job ID #[id number]    CC:       HPI  Review of Systems   Constitutional: Negative for activity change and appetite change.   HENT: Negative.    Eyes: Negative for discharge.   Respiratory: Negative for cough and shortness of breath.    Cardiovascular: Negative for chest pain and palpitations.   Gastrointestinal: Negative for abdominal distention, abdominal pain, constipation and vomiting.   Genitourinary: Negative for discharge, dysuria, flank pain, frequency, hematuria, testicular pain and urgency.   Musculoskeletal: Negative for arthralgias.   Skin: Negative for rash.   Neurological: Negative for dizziness.   Psychiatric/Behavioral: The patient is not nervous/anxious.        Objective:       Physical Exam   Constitutional: He appears well-developed and well-nourished.   HENT:   Head: Normocephalic.   Eyes: Pupils are equal, round, and reactive to light.   Neck: Normal range of motion.   Cardiovascular: Normal rate.    Pulmonary/Chest: Effort normal.   Abdominal: Soft. He exhibits no distension and no mass. There is no tenderness.   Genitourinary: Rectum normal, prostate normal and penis normal. Rectal exam shows no external hemorrhoid, no mass and no tenderness. Prostate is not enlarged and not tender. Right testis shows no mass and no tenderness. Left testis shows no mass and no tenderness. No discharge found.       Musculoskeletal: Normal range of motion.   Neurological: He is alert.   Skin: Skin is warm.     Psychiatric: He has a normal mood and affect.       Assessment:       1. Elevated PSA        Plan:       Elevated PSA  -     POCT URINE DIPSTICK WITHOUT MICROSCOPE    Patient is going to be out of town until the end of April.   Two options were considered: observation and serial of PSA every 4 to 6 mo or biopsy. He and his wife will prefer to proceed with  Biopsy. Patient will get an appointment with Dr. Hicks at the end of April 2018 to arrange that test.

## 2018-04-25 ENCOUNTER — OFFICE VISIT (OUTPATIENT)
Dept: UROLOGY | Facility: CLINIC | Age: 68
End: 2018-04-25
Payer: MEDICARE

## 2018-04-25 VITALS
HEIGHT: 66 IN | DIASTOLIC BLOOD PRESSURE: 76 MMHG | WEIGHT: 174.19 LBS | HEART RATE: 71 BPM | SYSTOLIC BLOOD PRESSURE: 110 MMHG | TEMPERATURE: 98 F | BODY MASS INDEX: 27.99 KG/M2 | RESPIRATION RATE: 18 BRPM

## 2018-04-25 DIAGNOSIS — N40.0 BENIGN PROSTATIC HYPERPLASIA WITHOUT LOWER URINARY TRACT SYMPTOMS: ICD-10-CM

## 2018-04-25 DIAGNOSIS — R97.20 ELEVATED PSA: Primary | ICD-10-CM

## 2018-04-25 DIAGNOSIS — Z80.42 FAMILY HISTORY OF PROSTATE CANCER: ICD-10-CM

## 2018-04-25 PROCEDURE — 99213 OFFICE O/P EST LOW 20 MIN: CPT | Mod: PBBFAC,PN | Performed by: UROLOGY

## 2018-04-25 PROCEDURE — 99214 OFFICE O/P EST MOD 30 MIN: CPT | Mod: S$PBB,,, | Performed by: UROLOGY

## 2018-04-25 PROCEDURE — 99999 PR PBB SHADOW E&M-EST. PATIENT-LVL III: CPT | Mod: PBBFAC,,, | Performed by: UROLOGY

## 2018-04-25 RX ORDER — CIPROFLOXACIN 500 MG/1
500 TABLET ORAL 2 TIMES DAILY
Qty: 14 TABLET | Refills: 0 | Status: SHIPPED | OUTPATIENT
Start: 2018-04-25 | End: 2018-05-31 | Stop reason: ALTCHOICE

## 2018-04-25 RX ORDER — CEFUROXIME AXETIL 500 MG/1
500 TABLET ORAL 2 TIMES DAILY
Qty: 14 TABLET | Refills: 0 | Status: SHIPPED | OUTPATIENT
Start: 2018-04-25 | End: 2018-05-31 | Stop reason: ALTCHOICE

## 2018-04-25 NOTE — PROGRESS NOTES
OFFICE NOTE    CHIEF COMPLAINT:  Elevated PSA.    HISTORY OF PRESENT ILLNESS:  This 68-year-old male presents for preop scheduling   for transrectal ultrasound biopsy of the prostate.  He was initially found to   have elevated PSA of 8.4 and was treated with doxycycline and the PSA did come   down to 5.3, but still above normal range, and he is thus being scheduled to   undergo transrectal ultrasound biopsy of the prostate.  The patient otherwise is   doing well, denies any complaints.    PHYSICAL EXAMINATION:  RECTAL:  Approximately 25 g smooth prostate with no distinct nodules.    FINAL IMPRESSION:  Elevated PSA.    RECOMMENDATION:  The procedure, the risks, side effects, complication,   expectation were discussed with the patient, and he said he understood and   agreed.  He was prescribed Ceftin 500 mg p.o. b.i.d. and Cipro 500 mg p.o.   b.i.d. for prophylactic antibiotics that he will start taking the day before the   procedure.  We will subsequently plan for transrectal ultrasound biopsy of the   prostate that we decided we will do under local anesthesia that is tentatively   scheduled for 05/17/2018.      MANAN  dd: 04/25/2018 13:38:51 (CDT)  td: 04/26/2018 05:53:33 (CDT)  Doc ID   #[id number]  Job ID #[id number]    CC:

## 2018-05-15 ENCOUNTER — TELEPHONE (OUTPATIENT)
Dept: UROLOGY | Facility: CLINIC | Age: 68
End: 2018-05-15

## 2018-05-15 DIAGNOSIS — R97.20 ELEVATED PROSTATE SPECIFIC ANTIGEN (PSA): Primary | ICD-10-CM

## 2018-05-15 DIAGNOSIS — R97.20 ELEVATED PSA: ICD-10-CM

## 2018-05-15 NOTE — TELEPHONE ENCOUNTER
----- Message from RT Juan Luis sent at 5/15/2018  9:37 AM CDT -----  Contact: Sandra,Wife,[phone]   Bobbyette,Wife,[phone], needing clarification on the pt directions for the pt's antibiotic medication, and verification is needed the pt's time of arrival for his procedure appt, thanks.

## 2018-05-15 NOTE — H&P
Subjective:       Patient ID: Shree Heredia is a 68 y.o. male.    Chief Complaint: Elevated PSA of 8.6    Past Medical History:   Diagnosis Date    Allergic rhinitis, seasonal     Gout attack     Hyperlipidemia      Past Surgical History:   Procedure Laterality Date    BACK SURGERY  01/29/2016    INGUINAL HERNIA REPAIR       Family History   Problem Relation Age of Onset    Hypertension Mother     Diabetes Mother     Hyperlipidemia Mother     Heart disease Mother     Glaucoma Mother     Glaucoma Father     Hyperlipidemia Father     Diabetes Father     Heart disease Sister     Diabetes Sister     Hypertension Sister     Heart disease Maternal Aunt     Heart disease Maternal Uncle      Social History     Social History    Marital status:      Spouse name: N/A    Number of children: N/A    Years of education: N/A     Occupational History     [employer]     Social History Main Topics    Smoking status: Former Smoker     Quit date: 1/1/2010    Smokeless tobacco: Never Used    Alcohol use Yes    Drug use: Unknown    Sexual activity: Not on file     Other Topics Concern    Not on file     Social History Narrative    No narrative on file       Current Outpatient Prescriptions   Medication Sig Dispense Refill    allopurinol (ZYLOPRIM) 100 MG tablet TAKE 2 TABLET BY MOUTH ONCE A DAY 60 tablet 11    aspirin 81 MG Chew Take 81 mg by mouth once daily.      cefUROXime (CEFTIN) 500 MG tablet Take 1 tablet (500 mg total) by mouth 2 (two) times daily. 14 tablet 0    cetirizine (ZYRTEC) 10 MG tablet Take 10 mg by mouth every evening.      ciprofloxacin HCl (CIPRO) 500 MG tablet Take 1 tablet (500 mg total) by mouth 2 (two) times daily. 14 tablet 0    colchicine (COLCRYS) 0.6 mg tablet Take 1 tablet (0.6 mg total) by mouth once daily. 90 tablet 3    ezetimibe (ZETIA) 10 mg tablet TAKE 1 TABLET BY MOUTH DAILY 90 tablet 3    indomethacin (INDOCIN) 25 MG capsule TAKE ONE CAPSULE 3 TIMES A DAY.  TAPER AS SOON AS GOUT FLARE IS RESOVLING AND TAKE WITH FOOD 30 capsule 2    omega-3 acid ethyl esters (LOVAZA) 1 gram capsule TAKE 2 CAPSULES BY MOUTH TWICE A  capsule 3    tadalafil (CIALIS) 5 MG tablet Take 1 tablet (5 mg total) by mouth daily as needed for Erectile Dysfunction. 30 tablet 0     No current facility-administered medications for this visit.      Review of patient's allergies indicates:   Allergen Reactions    No known drug allergies        Review of Systems   All other systems reviewed and are negative.      Objective:      There were no vitals filed for this visit.  Physical Exam   Cardiovascular: Normal rate.    Pulmonary/Chest: Effort normal.   Abdominal: Soft.   Genitourinary: Prostate normal and penis normal.   Genitourinary Comments: Smooth prostate with no nodules            Assessment:       1. Elevated prostate specific antigen (PSA)    2. Elevated PSA        Plan:       Transrectal ultrasound and biopsy of the prostate

## 2018-05-16 RX ORDER — GENTAMICIN SULFATE 80 MG/100ML
80 INJECTION, SOLUTION INTRAVENOUS
Status: DISCONTINUED | OUTPATIENT
Start: 2018-05-17 | End: 2018-05-16

## 2018-05-17 ENCOUNTER — HOSPITAL ENCOUNTER (OUTPATIENT)
Facility: AMBULARY SURGERY CENTER | Age: 68
Discharge: HOME | End: 2018-05-17
Attending: UROLOGY | Admitting: UROLOGY
Payer: MEDICARE

## 2018-05-17 ENCOUNTER — SURGERY (OUTPATIENT)
Age: 68
End: 2018-05-17

## 2018-05-17 DIAGNOSIS — R97.20 ELEVATED PROSTATE SPECIFIC ANTIGEN (PSA): ICD-10-CM

## 2018-05-17 DIAGNOSIS — R97.20 ELEVATED PSA: ICD-10-CM

## 2018-05-17 DIAGNOSIS — E78.5 HYPERLIPIDEMIA: ICD-10-CM

## 2018-05-17 PROCEDURE — 76872 US TRANSRECTAL: CPT | Mod: 26,,, | Performed by: UROLOGY

## 2018-05-17 PROCEDURE — G8907 PT DOC NO EVENTS ON DISCHARG: HCPCS | Performed by: UROLOGY

## 2018-05-17 PROCEDURE — 88305 TISSUE EXAM BY PATHOLOGIST: CPT | Performed by: PATHOLOGY

## 2018-05-17 PROCEDURE — 88305 TISSUE EXAM BY PATHOLOGIST: CPT | Mod: 26,,, | Performed by: PATHOLOGY

## 2018-05-17 PROCEDURE — 55700 HC PROSTATE NEEDLE BIOPSY: CPT | Performed by: UROLOGY

## 2018-05-17 PROCEDURE — 76872 US TRANSRECTAL: CPT | Performed by: UROLOGY

## 2018-05-17 PROCEDURE — 76942 ECHO GUIDE FOR BIOPSY: CPT | Mod: 26,59,, | Performed by: UROLOGY

## 2018-05-17 PROCEDURE — 55700 PR BIOPSY OF PROSTATE,NEEDLE/PUNCH: CPT | Mod: ,,, | Performed by: UROLOGY

## 2018-05-17 RX ORDER — GENTAMICIN SULFATE 40 MG/ML
80 INJECTION, SOLUTION INTRAMUSCULAR; INTRAVENOUS ONCE
Status: COMPLETED | OUTPATIENT
Start: 2018-05-17 | End: 2018-05-17

## 2018-05-17 RX ORDER — OMEGA-3-ACID ETHYL ESTERS 1 G/1
CAPSULE, LIQUID FILLED ORAL
Qty: 360 CAPSULE | Refills: 3 | Status: SHIPPED | OUTPATIENT
Start: 2018-05-17 | End: 2019-05-21 | Stop reason: SDUPTHER

## 2018-05-17 RX ORDER — PHENAZOPYRIDINE HYDROCHLORIDE 100 MG/1
200 TABLET, FILM COATED ORAL
Qty: 20 TABLET | Refills: 0 | Status: SHIPPED | OUTPATIENT
Start: 2018-05-17 | End: 2018-05-31 | Stop reason: ALTCHOICE

## 2018-05-17 RX ORDER — LIDOCAINE HYDROCHLORIDE 20 MG/ML
JELLY TOPICAL
Status: DISCONTINUED | OUTPATIENT
Start: 2018-05-17 | End: 2018-05-17 | Stop reason: HOSPADM

## 2018-05-17 RX ORDER — EZETIMIBE 10 MG/1
TABLET ORAL
Qty: 90 TABLET | Refills: 3 | Status: SHIPPED | OUTPATIENT
Start: 2018-05-17 | End: 2019-05-08 | Stop reason: SDUPTHER

## 2018-05-17 RX ORDER — PHENAZOPYRIDINE HYDROCHLORIDE 100 MG/1
200 TABLET, FILM COATED ORAL ONCE
Status: CANCELLED | OUTPATIENT
Start: 2018-05-17 | End: 2018-05-17

## 2018-05-17 RX ORDER — LIDOCAINE HYDROCHLORIDE 20 MG/ML
JELLY TOPICAL
Status: DISCONTINUED
Start: 2018-05-17 | End: 2018-05-17 | Stop reason: HOSPADM

## 2018-05-17 RX ADMIN — LIDOCAINE HYDROCHLORIDE 5 ML: 20 JELLY TOPICAL at 02:05

## 2018-05-17 RX ADMIN — GENTAMICIN SULFATE 80 MG: 40 INJECTION, SOLUTION INTRAMUSCULAR; INTRAVENOUS at 01:05

## 2018-05-17 NOTE — BRIEF OP NOTE
Operative Note     SUMMARY     Surgery Date: 5/17/2018     Surgeon(s) and Role:     * Seferino Hicks MD - Primary    Pre-op Diagnosis:  Elevated prostate specific antigen (PSA) [R97.20]    Post-op Diagnosis:  Elevated prostate specific antigen (PSA) [R97.20]    Procedure(s) (LRB):  TRANSRECTAL ULTRASOUND GUIDED PROSTATE BIOPSY (N/A)    Anesthesia: Local    Findings/Key Components:  See Op Note      Estimated Blood Loss: * No values recorded between 5/17/2018  2:23 PM and 5/17/2018  2:42 PM *         Specimens     Start     Ordered    05/17/18 1430  Specimen to Pathology - Surgery  Once      05/17/18 1434            Discharge Note      SUMMARY     Admit Date: 5/17/2018    Attending Physician: Seferino Hicks MD     Discharge Physician: Seferino Hicks MD    Discharge Date: 5/17/2018     Final Diagnosis: Elevated prostate specific antigen (PSA) [R97.20]    Hospital Course: uneventful, going home same day    Disposition: Home or Self Care    Patient Instructions:   Current Discharge Medication List      START taking these medications    Details   phenazopyridine (PYRIDIUM) 100 MG tablet Take 2 tablets (200 mg total) by mouth 3 (three) times daily with meals.  Qty: 20 tablet, Refills: 0         CONTINUE these medications which have NOT CHANGED    Details   cefUROXime (CEFTIN) 500 MG tablet Take 1 tablet (500 mg total) by mouth 2 (two) times daily.  Qty: 14 tablet, Refills: 0      ciprofloxacin HCl (CIPRO) 500 MG tablet Take 1 tablet (500 mg total) by mouth 2 (two) times daily.  Qty: 14 tablet, Refills: 0      omega-3 acid ethyl esters (LOVAZA) 1 gram capsule TAKE 2 CAPSULES BY MOUTH TWICE A DAY  Qty: 360 capsule, Refills: 3    Associated Diagnoses: Hyperlipidemia      allopurinol (ZYLOPRIM) 100 MG tablet TAKE 2 TABLET BY MOUTH ONCE A DAY  Qty: 60 tablet, Refills: 11    Associated Diagnoses: Hyperuricemia      aspirin 81 MG Chew Take 81 mg by mouth once daily.      cetirizine (ZYRTEC) 10 MG tablet Take 10 mg by mouth  every evening.      colchicine (COLCRYS) 0.6 mg tablet Take 1 tablet (0.6 mg total) by mouth once daily.  Qty: 90 tablet, Refills: 3    Associated Diagnoses: Acute idiopathic gout, unspecified site      ezetimibe (ZETIA) 10 mg tablet TAKE 1 TABLET BY MOUTH DAILY  Qty: 90 tablet, Refills: 3    Associated Diagnoses: Hyperlipidemia      indomethacin (INDOCIN) 25 MG capsule TAKE ONE CAPSULE 3 TIMES A DAY. TAPER AS SOON AS GOUT FLARE IS RESOVLING AND TAKE WITH FOOD  Qty: 30 capsule, Refills: 2      tadalafil (CIALIS) 5 MG tablet Take 1 tablet (5 mg total) by mouth daily as needed for Erectile Dysfunction.  Qty: 30 tablet, Refills: 0    Associated Diagnoses: Erectile dysfunction             Discharge Procedure Orders (must include Diet, Follow-up, Activity)  Resume previous diet.  Follow up with PCP as needed.

## 2018-05-17 NOTE — OP NOTE
TRANSRECTAL PROSTATIC ULTRASOUND, TRANSRECTAL PROSTATE BIOPSY     NAME:Shree Heredia  : 1950  Diagnosis: Elevated PSA, BPH,PROSTATE NODULE    DATE: 2018    SURGEON: Seferino Hicks MD    ANESTHESIA: Local    Current PSA: 5.3    JAZZY: smooth, firm prostate       PROCEDURE DESCRIPTION:  After informed consent was obtained and signed, the patient was brought to the ultrasound suite, and placed into left lateral decubitus position, with hips in full flexion.  The prostate was examined and measured the findings are as indicated.  US probe was placed into the rectum.  Axial and saggital views were obtained.                        TRANSRECTAL ULTRASOUND  Measurements:   Height:  29.7 mm  Width:  51.7 mm  Length:  43.8 mm  Volume: 35.2 cm3    Seminal vesicles/Ejaculatory Ducts: Normal  Outline/Symmetry of Prostate: WNL  Central Gland/Transition Zone: BPH  Peripheral Zone: WNL  Demarcation btw. Centra and Peripheral Zones: well demarcated  Bladder: Normal  MedianLobe: Normal    BIOPSY:  Biopsies were obtained from both lobes with total from 4 on each side including base, mid gland, apex and lateral aspects.  Pressure was held for several minutes on each lobe for hemostasis.  Prophylactic antibiotics were given in the form of Gentamycin 80 mg IM.    IMPRESSION:  Suspicion for adenocarcinoma of the prostate. BPH.    RECOMMENDATION:  1.  Instructed to return to clinic or ED should S/S including fever, chills or the inability to urinate.  2.  Return to clinic in 7-10 days for results and follow up.  3. Continue on Cipro 500 mg po bid and Ceftin 500 mg po bid until completed.

## 2018-05-21 ENCOUNTER — TELEPHONE (OUTPATIENT)
Dept: UROLOGY | Facility: CLINIC | Age: 68
End: 2018-05-21

## 2018-05-21 VITALS
BODY MASS INDEX: 27.99 KG/M2 | TEMPERATURE: 98 F | DIASTOLIC BLOOD PRESSURE: 76 MMHG | WEIGHT: 174.19 LBS | RESPIRATION RATE: 18 BRPM | SYSTOLIC BLOOD PRESSURE: 136 MMHG | HEIGHT: 66 IN | HEART RATE: 66 BPM

## 2018-05-21 NOTE — TELEPHONE ENCOUNTER
----- Message from Deena Rosas sent at 5/21/2018 11:47 AM CDT -----  Wife (Diana)calling for patient/stated patient had procedure done on May 17th/has not had bowel movement since/needs advice/please call back at [phone] to advise.

## 2018-05-21 NOTE — TELEPHONE ENCOUNTER
Returned call, spoke with patient wife, informed the patient can try some OTC laxatives to see if it helps, she said they would, she verbally understood.

## 2018-05-25 ENCOUNTER — TELEPHONE (OUTPATIENT)
Dept: UROLOGY | Facility: CLINIC | Age: 68
End: 2018-05-25

## 2018-05-25 NOTE — TELEPHONE ENCOUNTER
----- Message from Seferino Hicks MD sent at 5/25/2018  8:58 AM CDT -----  Path report prostate biopsy - Kylee 6 adenocarcinoma    Rec: Keep appointment to discuss

## 2018-05-31 ENCOUNTER — OFFICE VISIT (OUTPATIENT)
Dept: UROLOGY | Facility: CLINIC | Age: 68
End: 2018-05-31
Payer: MEDICARE

## 2018-05-31 VITALS
BODY MASS INDEX: 27.99 KG/M2 | DIASTOLIC BLOOD PRESSURE: 74 MMHG | HEART RATE: 68 BPM | RESPIRATION RATE: 18 BRPM | SYSTOLIC BLOOD PRESSURE: 119 MMHG | HEIGHT: 66 IN | TEMPERATURE: 98 F | WEIGHT: 174.19 LBS

## 2018-05-31 DIAGNOSIS — C61 MALIGNANT NEOPLASM OF PROSTATE: Primary | ICD-10-CM

## 2018-05-31 DIAGNOSIS — D49.59 NEOPLASM OF PROSTATE: ICD-10-CM

## 2018-05-31 DIAGNOSIS — R97.20 ELEVATED PSA: ICD-10-CM

## 2018-05-31 PROCEDURE — 99214 OFFICE O/P EST MOD 30 MIN: CPT | Mod: S$PBB,,, | Performed by: UROLOGY

## 2018-05-31 PROCEDURE — 99213 OFFICE O/P EST LOW 20 MIN: CPT | Mod: PBBFAC,PN | Performed by: UROLOGY

## 2018-05-31 PROCEDURE — 99999 PR PBB SHADOW E&M-EST. PATIENT-LVL III: CPT | Mod: PBBFAC,,, | Performed by: UROLOGY

## 2018-05-31 NOTE — PROGRESS NOTES
OFFICE NOTE    CHIEF COMPLAINT:  Adenocarcinoma of the prostate.    HISTORY OF PRESENT ILLNESS:  This 68-year-old male returns for routine recheck.    He had undergone transrectal ultrasound biopsy of the prostate on 05/17/2018   and the pathology report revealed a [place] 6 adenocarcinoma from the biopsy   site of the right apex of the prostate.  Other biopsy sites were all negative.    The patient was informed of the findings.  Further evaluation and treatment   options were discussed with the patient.  I did mention I would like to complete   the metastatic workup including CT scan and bone scan.  In terms of treatment   options, we discussed radical prostatectomy, radiation therapy, hormonal   therapy, brachytherapy and watchful waiting and a combination of the above, and   he was given brochure information for him to review.  It must be noted the   patient's PSA was 5.3 at the time of the biopsy.    FINAL IMPRESSION:  Adenocarcinoma of the prostate.    RECOMMENDATIONS:  CT scan of the abdomen and pelvis, bone scan, CBC, CMP, and   the patient will subsequently make a followup appointment to discuss the   treatment plan.      MANAN  dd: 05/31/2018 13:05:03 (MAXIMILIAN)  td: 06/01/2018 03:53:33 (MAXIMILIAN)  Doc ID   #[id number]  Job ID #[id number]    CC:

## 2018-06-08 ENCOUNTER — HOSPITAL ENCOUNTER (OUTPATIENT)
Dept: RADIOLOGY | Facility: HOSPITAL | Age: 68
Discharge: HOME | End: 2018-06-08
Attending: UROLOGY
Payer: MEDICARE

## 2018-06-08 DIAGNOSIS — D49.59 NEOPLASM OF PROSTATE: ICD-10-CM

## 2018-06-08 PROCEDURE — 25500020 PHARM REV CODE 255

## 2018-06-08 PROCEDURE — 74178 CT ABD&PLV WO CNTR FLWD CNTR: CPT | Mod: TC

## 2018-06-08 PROCEDURE — 74178 CT ABD&PLV WO CNTR FLWD CNTR: CPT | Mod: 26,,, | Performed by: RADIOLOGY

## 2018-06-08 PROCEDURE — A9503 TC99M MEDRONATE: HCPCS

## 2018-06-08 PROCEDURE — 78306 BONE IMAGING WHOLE BODY: CPT | Mod: 26,,, | Performed by: RADIOLOGY

## 2018-06-08 RX ORDER — SODIUM CHLORIDE 9 MG/ML
INJECTION, SOLUTION INTRAVENOUS
Status: DISPENSED
Start: 2018-06-08 | End: 2018-06-08

## 2018-06-08 RX ADMIN — IOHEXOL 75 ML: 350 INJECTION, SOLUTION INTRAVENOUS at 11:06

## 2018-06-08 RX ADMIN — IOHEXOL 30 ML: 350 INJECTION, SOLUTION INTRAVENOUS at 11:06

## 2018-06-12 ENCOUNTER — TELEPHONE (OUTPATIENT)
Dept: UROLOGY | Facility: CLINIC | Age: 68
End: 2018-06-12

## 2018-06-12 NOTE — TELEPHONE ENCOUNTER
Call placed to give imaging results, no answer, phones goes to busy signal, unable to leave a message.

## 2018-06-12 NOTE — TELEPHONE ENCOUNTER
----- Message from Seferino Hicks MD sent at 6/8/2018  4:18 PM CDT -----  CT scan and bone scan show no evidence of metastases, 2 mm right middle lobe lung nodule.    Rec: Needs appointment to discuss treatment plan and needs follow-up with PCP or pulmonary concerning the lung nodule.

## 2018-06-14 ENCOUNTER — TELEPHONE (OUTPATIENT)
Dept: UROLOGY | Facility: CLINIC | Age: 68
End: 2018-06-14

## 2018-06-14 NOTE — TELEPHONE ENCOUNTER
Call placed to give CT and bone scan results. No answer,unable to leave message as phone rings and then goes to busy signal.

## 2018-08-07 ENCOUNTER — OFFICE VISIT (OUTPATIENT)
Dept: UROLOGY | Facility: CLINIC | Age: 68
End: 2018-08-07
Payer: MEDICARE

## 2018-08-07 VITALS
DIASTOLIC BLOOD PRESSURE: 79 MMHG | RESPIRATION RATE: 18 BRPM | WEIGHT: 174.19 LBS | TEMPERATURE: 98 F | HEART RATE: 61 BPM | HEIGHT: 66 IN | BODY MASS INDEX: 27.99 KG/M2 | SYSTOLIC BLOOD PRESSURE: 121 MMHG

## 2018-08-07 DIAGNOSIS — C61 MALIGNANT NEOPLASM OF PROSTATE: Primary | ICD-10-CM

## 2018-08-07 DIAGNOSIS — R91.1 PULMONARY NODULE: ICD-10-CM

## 2018-08-07 PROCEDURE — 99999 PR PBB SHADOW E&M-EST. PATIENT-LVL III: CPT | Mod: PBBFAC,,, | Performed by: UROLOGY

## 2018-08-07 PROCEDURE — 99214 OFFICE O/P EST MOD 30 MIN: CPT | Mod: S$PBB,,, | Performed by: UROLOGY

## 2018-08-07 PROCEDURE — 99213 OFFICE O/P EST LOW 20 MIN: CPT | Mod: PBBFAC,PN | Performed by: UROLOGY

## 2018-08-07 NOTE — PROGRESS NOTES
OFFICE NOTE    CHIEF COMPLAINT:  Adenocarcinoma of the prostate.    HISTORY OF PRESENT ILLNESS:  This 68-year-old male returns for routine recheck.    He has a history of adenocarcinoma of the prostate that was diagnosed following   prostate ultrasound with biopsy on 05/17/2018 when the PSA was 5.3 and he was   found to have a Kylee 6 adenocarcinoma from one biopsy site in the right apex.    The rest of the specimens were all negative.  Prostatic volume was 35.2 mL.    The patient has completed a CT scan and bone scan, both of which showed no   evidence of any metastatic disease.  The only finding was a right pulmonary   nodule.  The patient was unaware of any pulmonary problems in the past.  The   patient overall doing well on today's visit.    We subsequently had a further discussion concerning further management of his   adenocarcinoma of the prostate and he states he is leaning towards undergoing   brachytherapy.  I mentioned that we will need to make an appointment to see the   radiation oncologist, Dr. Dobbs, who performs brachytherapy.  In terms of the   pulmonary nodule, it was suggested that he see a pulmonary specialist to complete   the evaluation of it and for followup.  He said he understood and agreed.    FINAL IMPRESSION:  Adenocarcinoma of prostate, right pulmonary nodule.    RECOMMENDATION:  We will consult Dr. Dobbs for which the patient will be made   an appointment to be evaluated for brachytherapy.  The patient otherwise will   consult with Pulmonary in terms of the pulmonary nodule.  Subsequent followup   with us pending the above.      MANAN  dd: 08/07/2018 16:54:47 (CDT)  td: 08/08/2018 03:18:43 (CDT)  Doc ID   #[id number]  Job ID #[id number]    CC:

## 2018-08-09 ENCOUNTER — TELEPHONE (OUTPATIENT)
Dept: UROLOGY | Facility: CLINIC | Age: 68
End: 2018-08-09

## 2018-08-09 NOTE — TELEPHONE ENCOUNTER
Spoke with patient, informed contacted Dr Helton office and appt made, date , time and location given with instructions. Our records faxed to their office, patient verbally understood.

## 2018-08-09 NOTE — TELEPHONE ENCOUNTER
Call placed to Dr Helton office to give a referral to make patient an appt, no answer, message left with call back number.

## 2018-08-10 ENCOUNTER — TELEPHONE (OUTPATIENT)
Dept: UROLOGY | Facility: CLINIC | Age: 68
End: 2018-08-10

## 2018-08-10 NOTE — TELEPHONE ENCOUNTER
Call placed to inform patient that appt has been made for him to see Dr Dobbs on 8/17/18, no answer, unable to leave a message.

## 2018-08-10 NOTE — TELEPHONE ENCOUNTER
Patient returned call, appt with Dr Dobbs date , time, location given, patient verbally understood.

## 2018-08-16 DIAGNOSIS — R91.1 PULMONARY NODULE: Primary | ICD-10-CM

## 2018-08-17 ENCOUNTER — TELEPHONE (OUTPATIENT)
Dept: UROLOGY | Facility: CLINIC | Age: 68
End: 2018-08-17

## 2018-08-17 ENCOUNTER — HOSPITAL ENCOUNTER (OUTPATIENT)
Dept: RADIOLOGY | Facility: HOSPITAL | Age: 68
Discharge: HOME | End: 2018-08-17
Attending: INTERNAL MEDICINE
Payer: MEDICARE

## 2018-08-17 DIAGNOSIS — R91.1 PULMONARY NODULE: ICD-10-CM

## 2018-08-17 PROCEDURE — 71250 CT THORAX DX C-: CPT | Mod: TC

## 2018-08-17 PROCEDURE — 71250 CT THORAX DX C-: CPT | Mod: 26,,, | Performed by: RADIOLOGY

## 2018-08-17 NOTE — TELEPHONE ENCOUNTER
----- Message from Dorothy Hays sent at 8/17/2018  9:21 AM CDT -----  Contact: Dr Rinku Benson, [facility]             Please fax the patient's pathology report to [phone].  Patient is currently in the office.   Callback number [phone]

## 2018-08-17 NOTE — TELEPHONE ENCOUNTER
Results faxed as requested, call placed and spoke with Marcelino, inquired did she receive the results, she did.

## 2018-11-20 DIAGNOSIS — M10.00 ACUTE IDIOPATHIC GOUT, UNSPECIFIED SITE: ICD-10-CM

## 2018-11-21 RX ORDER — COLCHICINE 0.6 MG/1
TABLET ORAL
Qty: 90 TABLET | Refills: 3 | Status: SHIPPED | OUTPATIENT
Start: 2018-11-21 | End: 2019-11-29 | Stop reason: SDUPTHER

## 2018-12-10 ENCOUNTER — TELEPHONE (OUTPATIENT)
Dept: UROLOGY | Facility: CLINIC | Age: 68
End: 2018-12-10

## 2018-12-10 ENCOUNTER — TELEPHONE (OUTPATIENT)
Dept: FAMILY MEDICINE | Facility: CLINIC | Age: 68
End: 2018-12-10

## 2018-12-10 DIAGNOSIS — R97.20 ELEVATED PSA: Primary | ICD-10-CM

## 2018-12-10 NOTE — TELEPHONE ENCOUNTER
----- Message from Christal Cox sent at 12/10/2018  9:45 AM CST -----  Contact: self [phone]  Please call him to reschedule his appt for 3/21.  He will be out of town.  Thank you!

## 2018-12-10 NOTE — TELEPHONE ENCOUNTER
----- Message from Scarlett Lind sent at 12/10/2018  9:39 AM CST -----  Contact: Patient  Patient is requesting orders for a PSA, please call once completed so patient can schedule, thank you! [phone] (home)

## 2018-12-10 NOTE — TELEPHONE ENCOUNTER
Called pt, advised soonest appt for rescheduled is on 08/01/19. Pt agreed to the appt, scheduled pt - advised of time. Pt verbalized understanding.

## 2018-12-10 NOTE — TELEPHONE ENCOUNTER
Called and spoke with patient, informed the order for PSA is in and he can go have done here at the hospital or the [facility] clinic, patient verbally understood.

## 2018-12-11 ENCOUNTER — LAB VISIT (OUTPATIENT)
Dept: LAB | Facility: HOSPITAL | Age: 68
End: 2018-12-11
Attending: UROLOGY
Payer: MEDICARE

## 2018-12-11 DIAGNOSIS — R97.20 ELEVATED PSA: ICD-10-CM

## 2018-12-11 LAB — COMPLEXED PSA SERPL-MCNC: 0.82 NG/ML

## 2018-12-11 PROCEDURE — 36415 COLL VENOUS BLD VENIPUNCTURE: CPT | Mod: PO

## 2018-12-11 PROCEDURE — 84153 ASSAY OF PSA TOTAL: CPT

## 2019-02-01 ENCOUNTER — OFFICE VISIT (OUTPATIENT)
Dept: UROLOGY | Facility: CLINIC | Age: 69
End: 2019-02-01
Payer: MEDICARE

## 2019-02-01 VITALS
SYSTOLIC BLOOD PRESSURE: 113 MMHG | HEIGHT: 66 IN | WEIGHT: 172.81 LBS | DIASTOLIC BLOOD PRESSURE: 70 MMHG | RESPIRATION RATE: 18 BRPM | BODY MASS INDEX: 27.77 KG/M2 | HEART RATE: 70 BPM | TEMPERATURE: 98 F

## 2019-02-01 DIAGNOSIS — R97.20 ELEVATED PSA: Primary | ICD-10-CM

## 2019-02-01 DIAGNOSIS — C61 PROSTATE CANCER: ICD-10-CM

## 2019-02-01 DIAGNOSIS — N40.1 HYPERPLASIA OF PROSTATE WITH LOWER URINARY TRACT SYMPTOMS (LUTS): ICD-10-CM

## 2019-02-01 LAB
BILIRUB SERPL-MCNC: NORMAL MG/DL
BLOOD URINE, POC: NORMAL
COLOR, POC UA: YELLOW
GLUCOSE UR QL STRIP: NORMAL
KETONES UR QL STRIP: NORMAL
LEUKOCYTE ESTERASE URINE, POC: NORMAL
NITRITE, POC UA: NORMAL
PH, POC UA: 5.5
PROTEIN, POC: NORMAL
SPECIFIC GRAVITY, POC UA: 1.02
UROBILINOGEN, POC UA: 0.2

## 2019-02-01 PROCEDURE — 99999 PR PBB SHADOW E&M-EST. PATIENT-LVL III: ICD-10-PCS | Mod: PBBFAC,,, | Performed by: UROLOGY

## 2019-02-01 PROCEDURE — 99214 PR OFFICE/OUTPT VISIT, EST, LEVL IV, 30-39 MIN: ICD-10-PCS | Mod: S$PBB,,, | Performed by: UROLOGY

## 2019-02-01 PROCEDURE — 99213 OFFICE O/P EST LOW 20 MIN: CPT | Mod: PBBFAC,PN | Performed by: UROLOGY

## 2019-02-01 PROCEDURE — 99214 OFFICE O/P EST MOD 30 MIN: CPT | Mod: S$PBB,,, | Performed by: UROLOGY

## 2019-02-01 PROCEDURE — 81002 URINALYSIS NONAUTO W/O SCOPE: CPT | Mod: PBBFAC,PN | Performed by: UROLOGY

## 2019-02-01 PROCEDURE — 81000 URINALYSIS NONAUTO W/SCOPE: CPT | Mod: PBBFAC,PN | Performed by: UROLOGY

## 2019-02-01 PROCEDURE — 99999 PR PBB SHADOW E&M-EST. PATIENT-LVL III: CPT | Mod: PBBFAC,,, | Performed by: UROLOGY

## 2019-02-01 RX ORDER — TAMSULOSIN HYDROCHLORIDE 0.4 MG/1
1 CAPSULE ORAL DAILY
Refills: 2 | COMMUNITY
Start: 2019-01-10 | End: 2020-01-17

## 2019-02-01 RX ORDER — AZELASTINE 1 MG/ML
1 SPRAY, METERED NASAL 2 TIMES DAILY
Refills: 11 | COMMUNITY
Start: 2018-11-17 | End: 2020-01-02 | Stop reason: SDUPTHER

## 2019-02-01 NOTE — PROGRESS NOTES
OFFICE NOTE    CHIEF COMPLAINT:  Adenocarcinoma of the prostate and lower urinary tract   symptoms.    HISTORY OF PRESENT ILLNESS:  This 68-year-old male returns for routine recheck.    He has a history of adenocarcinoma of the prostate for which he underwent   brachytherapy in September 2018 by Dr. Dobbs and is returning now for routine   recheck.  He has tolerated the procedure well.  Denies any complaints.  He does   though take Flomax for management of lower urinary tract symptoms.  Otherwise,   has been doing very well and denies any other complaints.    MEDICAL HISTORY UPDATE:  No other change in general health since his last visit   of 08/07/2018.    PHYSICAL EXAMINATION:  ABDOMEN:  Soft, benign, and nontender.  No masses.  No hernias, no organomegaly.  GENITALIA EXAMINATION:  Normal phallus with adequate meatus.  Testes descended   and feel normal.  No scrotal masses.  RECTAL:  Reveals a smooth, firm, flattened prostatic area.  No nodule.  Normal   sphincter tone.    His most recent PSA was 0.82 on 12/11/2018.    UA negative with pH 5.5.    FINAL IMPRESSION:  Adenocarcinoma of the prostate, lower urinary tract symptoms.    RECOMMENDATIONS:  Continue on Flomax 0.4 mg p.o. daily.  Recheck in 6 months   with PSA.      MANAN  dd: 02/01/2019 12:01:26 (CST)  td: 02/01/2019 23:04:02 (CST)  Doc ID   #[id number]  Job ID #[id number]    CC:

## 2019-02-06 ENCOUNTER — CLINICAL SUPPORT (OUTPATIENT)
Dept: URGENT CARE | Facility: CLINIC | Age: 69
End: 2019-02-06
Payer: MEDICARE

## 2019-02-06 VITALS
SYSTOLIC BLOOD PRESSURE: 149 MMHG | DIASTOLIC BLOOD PRESSURE: 90 MMHG | BODY MASS INDEX: 27.16 KG/M2 | OXYGEN SATURATION: 97 % | RESPIRATION RATE: 16 BRPM | HEART RATE: 103 BPM | TEMPERATURE: 101 F | WEIGHT: 169 LBS | HEIGHT: 66 IN

## 2019-02-06 DIAGNOSIS — J10.1 INFLUENZA A: Primary | ICD-10-CM

## 2019-02-06 DIAGNOSIS — R05.9 COUGH: ICD-10-CM

## 2019-02-06 LAB
CTP QC/QA: YES
FLUAV AG NPH QL: POSITIVE
FLUBV AG NPH QL: NEGATIVE

## 2019-02-06 PROCEDURE — 96372 PR INJECTION,THERAP/PROPH/DIAG2ST, IM OR SUBCUT: ICD-10-PCS | Mod: S$GLB,,, | Performed by: NURSE PRACTITIONER

## 2019-02-06 PROCEDURE — 99204 PR OFFICE/OUTPT VISIT, NEW, LEVL IV, 45-59 MIN: ICD-10-PCS | Mod: 25,S$GLB,, | Performed by: NURSE PRACTITIONER

## 2019-02-06 PROCEDURE — 87804 INFLUENZA ASSAY W/OPTIC: CPT | Mod: QW,,, | Performed by: NURSE PRACTITIONER

## 2019-02-06 PROCEDURE — 87804 POCT INFLUENZA A/B: ICD-10-PCS | Mod: 59,QW,, | Performed by: NURSE PRACTITIONER

## 2019-02-06 PROCEDURE — 99204 OFFICE O/P NEW MOD 45 MIN: CPT | Mod: 25,S$GLB,, | Performed by: NURSE PRACTITIONER

## 2019-02-06 PROCEDURE — 96372 THER/PROPH/DIAG INJ SC/IM: CPT | Mod: S$GLB,,, | Performed by: NURSE PRACTITIONER

## 2019-02-06 RX ORDER — CETIRIZINE HYDROCHLORIDE 10 MG/1
10 TABLET ORAL DAILY
Qty: 30 TABLET | Refills: 2 | Status: SHIPPED | OUTPATIENT
Start: 2019-02-06 | End: 2019-08-01 | Stop reason: SDUPTHER

## 2019-02-06 RX ORDER — DEXAMETHASONE SODIUM PHOSPHATE 4 MG/ML
8 INJECTION, SOLUTION INTRA-ARTICULAR; INTRALESIONAL; INTRAMUSCULAR; INTRAVENOUS; SOFT TISSUE
Status: COMPLETED | OUTPATIENT
Start: 2019-02-06 | End: 2019-02-06

## 2019-02-06 RX ORDER — PROMETHAZINE HYDROCHLORIDE AND DEXTROMETHORPHAN HYDROBROMIDE 6.25; 15 MG/5ML; MG/5ML
5 SYRUP ORAL EVERY 4 HOURS PRN
Qty: 240 ML | Refills: 0 | Status: SHIPPED | OUTPATIENT
Start: 2019-02-06 | End: 2019-02-16

## 2019-02-06 RX ORDER — FLUTICASONE PROPIONATE 50 MCG
2 SPRAY, SUSPENSION (ML) NASAL 2 TIMES DAILY
Qty: 1 BOTTLE | Refills: 0 | Status: SHIPPED | OUTPATIENT
Start: 2019-02-06 | End: 2024-03-14 | Stop reason: SDUPTHER

## 2019-02-06 RX ORDER — ACETAMINOPHEN 500 MG
1000 TABLET ORAL
Status: COMPLETED | OUTPATIENT
Start: 2019-02-06 | End: 2019-02-06

## 2019-02-06 RX ORDER — PREDNISONE 20 MG/1
20 TABLET ORAL 2 TIMES DAILY
Qty: 10 TABLET | Refills: 0 | Status: SHIPPED | OUTPATIENT
Start: 2019-02-06 | End: 2019-02-11

## 2019-02-06 RX ADMIN — DEXAMETHASONE SODIUM PHOSPHATE 8 MG: 4 INJECTION, SOLUTION INTRA-ARTICULAR; INTRALESIONAL; INTRAMUSCULAR; INTRAVENOUS; SOFT TISSUE at 12:02

## 2019-02-06 RX ADMIN — Medication 1000 MG: at 12:02

## 2019-02-06 NOTE — PATIENT INSTRUCTIONS
Symptomatic treatment:    Alternate Tylenol and Ibuprofen every 3 hrs for fever, pain and inflammation. Avoid Nsaids if you are pregnant or have advanced kidney disease.     salt water gargles to soothe throat from post nasal drip  Honey/lemon water or warm tea to soothe throat from post nasal drip  Cepachol helps to soothe the discomfort in throat from post nasal drip    Cold-eeze helps to reduce the duration of URI symptoms if taken early  Elderberry to reduce duration of viral URI symptoms    Nasal saline spray reduces inflammation and dryness  Warm face compresses/hot showers as often as you can to open sinuses and allow to drain.   Flonase OTC or Nasacort OTC to help decrease inflammation in nasal turbinates and allow sinuses to drain    Vicks vapor rub at night  Simple foods like chicken noodle soup help provide hydration and nutrition    Delsym helps with coughing at night    Zantac will help if there is reflux from the post nasal drip and helpful to take at night    Zyrtec/Claritin during the day and Benadryl at night may help if allergy component concurrently with URI    Rest as much as you can    Your symptoms will likely last 5-7 days, maybe longer depending on how it affects your body.  You are contagious 5-7, so minimize contact with others to reduce the spread to others and stay home from work or school as we discussed. Dehydration is preventable but is one of the main reasons why you will feel so badly. Drink pedialyte, gatorade or propel. Stay hydrated.  Antibiotics are not needed unless a complication(such as Otitis Media, Bacterial sinus infection or pneumonia) develops. Taking antibiotics for Flu/Cold is not supported by evidence-based medicine and can expose you to unnecessary side effects of the medication, such as anaphylaxis, yeast infection and leads to antibiotic resistance.   If you experience any:  Chest pain, shortness of breath, wheezing or difficulty breathing,  Severe headache, face,  neck or ear pain,  New rash,  Fever over 101.5º F (38.6 C) for more than three days,  Confusion, behavior change or seizure,  Severe weakness or dizziness, please go to the ER immediately for further testing.             Influenza (Adult)    Influenza is also called the flu. It is a viral illness that affects the air passages of your lungs. It is different from the common cold. The flu can easily be passed from one to person to another. It may be spread through the air by coughing and sneezing. Or it can be spread by touching the sick person and then touching your own eyes, nose, or mouth.  The flu starts 1 to 3 days after you are exposed to the flu virus. It may last for 1 to 2 weeks but many people feel tired or fatigued for many weeks afterward. You usually dont need to take antibiotics unless you have a complication. This might be an ear or sinus infection or pneumonia.  Symptoms of the flu may be mild or severe. They can include extreme tiredness (wanting to stay in bed all day), chills, fevers, muscle aches, soreness with eye movement, headache, and a dry, hacking cough.  Home care  Follow these guidelines when caring for yourself at home:  · Avoid being around cigarette smoke, whether yours or other peoples.  · Acetaminophen or ibuprofen will help ease your fever, muscle aches, and headache. Dont give aspirin to anyone younger than 18 who has the flu. Aspirin can harm the liver.  · Nausea and loss of appetite are common with the flu. Eat light meals. Drink 6 to 8 glasses of liquids every day. Good choices are water, sport drinks, soft drinks without caffeine, juices, tea, and soup. Extra fluids will also help loosen secretions in your nose and lungs.  · Over-the-counter cold medicines will not make the flu go away faster. But the medicines may help with coughing, sore throat, and congestion in your nose and sinuses. Dont use a decongestant if you have high blood pressure.  · Stay home until your fever has  been gone for at least 24 hours without using medicine to reduce fever.  Follow-up care  Follow up with your healthcare provider, or as advised, if you are not getting better over the next week.  If you are age 65 or older, talk with your provider about getting a pneumococcal vaccine every 5 years. You should also get this vaccine if you have chronic asthma or COPD. All adults should get a flu vaccine every fall. Ask your provider about this.  When to seek medical advice  Call your healthcare provider right away if any of these occur:  · Cough with lots of colored mucus (sputum) or blood in your mucus  · Chest pain, shortness of breath, wheezing, or trouble breathing  · Severe headache, or face, neck, or ear pain  · New rash with fever  · Fever of 100.4°F (38°C) or higher, or as directed by your healthcare provider  · Confusion, behavior change, or seizure  · Severe weakness or dizziness  · You get a new fever or cough after getting better for a few days  Date Last Reviewed: 1/1/2017  © 2999-3390 [employer]. [address] [place] [address], [place] [address]. All rights reserved. This information is not intended as a substitute for professional medical care. Always follow your healthcare professional's instructions.

## 2019-02-06 NOTE — PROGRESS NOTES
"Subjective:       Patient ID: Shree Heredia is a 68 y.o. male.    Vitals:  height is 5' 6" (1.676 m) and weight is 76.7 kg (169 lb). His oral temperature is 101.3 °F (38.5 °C) (abnormal). His blood pressure is 149/90 (abnormal) and his pulse is 103. His respiration is 16 and oxygen saturation is 97%.     Chief Complaint: Cough and Chills    Cough   This is a new problem. The current episode started today. The problem has been rapidly worsening. The problem occurs constantly. The cough is productive of sputum. Associated symptoms include chills, a fever, myalgias, nasal congestion, postnasal drip, rhinorrhea, a sore throat and sweats. Pertinent negatives include no chest pain, headaches, rash or shortness of breath. Nothing aggravates the symptoms. He has tried OTC cough suppressant for the symptoms.       Constitution: Positive for chills, sweating, fatigue, fever and generalized weakness.   HENT: Positive for congestion, postnasal drip, sinus pain and sore throat.    Neck: Negative for painful lymph nodes.   Cardiovascular: Negative for chest pain and leg swelling.   Eyes: Negative for double vision and blurred vision.   Respiratory: Positive for cough. Negative for shortness of breath.    Gastrointestinal: Negative for nausea, vomiting and diarrhea.   Genitourinary: Negative for dysuria, frequency and urgency.   Musculoskeletal: Positive for muscle ache. Negative for joint pain, joint swelling and muscle cramps.   Skin: Negative for color change, pale and rash.   Allergic/Immunologic: Negative for seasonal allergies.   Neurological: Negative for dizziness, history of vertigo, light-headedness, passing out and headaches.   Hematologic/Lymphatic: Negative for swollen lymph nodes, easy bruising/bleeding and history of blood clots. Does not bruise/bleed easily.   Psychiatric/Behavioral: Negative for nervous/anxious, sleep disturbance and depression. The patient is not nervous/anxious.        Objective:    "   Physical Exam   Constitutional: He is oriented to person, place, and time. He appears well-developed and well-nourished. He is active and cooperative. He appears ill.   HENT:   Head: Normocephalic and atraumatic.   Right Ear: Hearing, external ear and ear canal normal. A middle ear effusion is present.   Left Ear: Hearing, external ear and ear canal normal. A middle ear effusion is present.   Nose: Mucosal edema and rhinorrhea present. Right sinus exhibits maxillary sinus tenderness. Left sinus exhibits maxillary sinus tenderness.   Mouth/Throat: Uvula is midline and mucous membranes are normal. Posterior oropharyngeal erythema present.   Eyes: Conjunctivae, EOM and lids are normal. Pupils are equal, round, and reactive to light.   Neck: Trachea normal, normal range of motion and phonation normal. Neck supple.   Cardiovascular: Normal rate, regular rhythm, normal heart sounds, intact distal pulses and normal pulses.   Pulmonary/Chest: Effort normal and breath sounds normal.   Abdominal: Soft. Bowel sounds are normal.   Musculoskeletal: Normal range of motion.   Neurological: He is alert and oriented to person, place, and time. He has normal strength. GCS eye subscore is 4. GCS verbal subscore is 5. GCS motor subscore is 6.   Skin: Skin is warm, dry and intact.   Psychiatric: He has a normal mood and affect. His behavior is normal. Judgment and thought content normal.   Nursing note and vitals reviewed.      Assessment:       1. Cough        Plan:         Cough  -     POCT Influenza A/B    Other orders  -     dexamethasone injection 8 mg  -     cetirizine (ZYRTEC) 10 MG tablet; Take 1 tablet (10 mg total) by mouth once daily.  Dispense: 30 tablet; Refill: 2  -     fluticasone (FLONASE) 50 mcg/actuation nasal spray; 2 sprays (100 mcg total) by Each Nare route 2 (two) times daily.  Dispense: 1 Bottle; Refill: 0  -     promethazine-dextromethorphan (PROMETHAZINE-DM) 6.25-15 mg/5 mL Syrp; Take 5 mLs by mouth every 4  (four) hours as needed.  Dispense: 240 mL; Refill: 0  -     predniSONE (DELTASONE) 20 MG tablet; Take 1 tablet (20 mg total) by mouth 2 (two) times daily. for 5 days  Dispense: 10 tablet; Refill: 0  -     baloxavir marboxil (XOFLUZA) 40 mg Tab; Take 40 mg by mouth once. for 1 dose  Dispense: 1 tablet; Refill: 0  -     acetaminophen tablet 1,000 mg          no

## 2019-02-06 NOTE — LETTER
February 6, 2019      [facility]  [address]  [facility] [address]  Phone: [phone]       Patient: Shree Heredia   YOB: 1950  Date of Visit: 02/06/2019    To Whom It May Concern:    Sapphire Heredia  was at [facility] on 02/06/2019. He may return to work/school on 2/8/19 with no restrictions. If you have any questions or concerns, or if I can be of further assistance, please do not hesitate to contact me.    Sincerely,    CHARLINE Escalona

## 2019-02-11 ENCOUNTER — CLINICAL SUPPORT (OUTPATIENT)
Dept: URGENT CARE | Facility: CLINIC | Age: 69
End: 2019-02-11
Payer: MEDICARE

## 2019-02-11 VITALS
OXYGEN SATURATION: 98 % | RESPIRATION RATE: 16 BRPM | WEIGHT: 168.81 LBS | HEIGHT: 66 IN | BODY MASS INDEX: 27.13 KG/M2 | HEART RATE: 77 BPM | SYSTOLIC BLOOD PRESSURE: 116 MMHG | DIASTOLIC BLOOD PRESSURE: 75 MMHG | TEMPERATURE: 98 F

## 2019-02-11 DIAGNOSIS — B96.89 BACTERIAL SINUSITIS: Primary | ICD-10-CM

## 2019-02-11 DIAGNOSIS — J32.9 BACTERIAL SINUSITIS: Primary | ICD-10-CM

## 2019-02-11 PROCEDURE — 99214 PR OFFICE/OUTPT VISIT, EST, LEVL IV, 30-39 MIN: ICD-10-PCS | Mod: S$GLB,,, | Performed by: NURSE PRACTITIONER

## 2019-02-11 PROCEDURE — 99214 OFFICE O/P EST MOD 30 MIN: CPT | Mod: S$GLB,,, | Performed by: NURSE PRACTITIONER

## 2019-02-11 RX ORDER — AMOXICILLIN AND CLAVULANATE POTASSIUM 875; 125 MG/1; MG/1
1 TABLET, FILM COATED ORAL 2 TIMES DAILY
Qty: 14 TABLET | Refills: 0 | Status: SHIPPED | OUTPATIENT
Start: 2019-02-11 | End: 2019-02-18

## 2019-02-11 NOTE — PROGRESS NOTES
"Subjective:       Patient ID: Shree Heredia is a 68 y.o. male.    Vitals:  height is 5' 6" (1.676 m) and weight is 76.6 kg (168 lb 12.8 oz). His temperature is 97.7 °F (36.5 °C). His blood pressure is 116/75 and his pulse is 77. His respiration is 16 and oxygen saturation is 98%.     Chief Complaint: Follow-up    Shree Heredia is a 68 year old male presenting to the clinic with a 3 week history of sinus pressure. He was diagnosed with influenza last week and states that those symptoms have resolved. He was having sinus pressure and post nasal drip prior to influenza diagnosis and reports that these are still ongoing. He has been using OTC medications without relief. He has not had fever since the flu. He has been evaluated in the past for frequent sinus infections but was told he was not a candidate for surgery.         Constitution: Negative. Negative for chills and fever.   HENT: Positive for congestion, postnasal drip, sinus pain and sinus pressure. Negative for voice change.    Neck: negative.   Cardiovascular: Negative.    Eyes: Negative for eye discharge and eye redness.   Respiratory: Negative for cough.    Gastrointestinal: Negative.    Genitourinary: Negative.    Musculoskeletal: Negative.    Skin: Negative.    Allergic/Immunologic: Negative.    Neurological: Negative.        Objective:      Physical Exam   Constitutional: He is oriented to person, place, and time. He appears well-developed and well-nourished. He is cooperative.  Non-toxic appearance. He does not appear ill. No distress.   HENT:   Head: Normocephalic and atraumatic.   Right Ear: Hearing, tympanic membrane, external ear and ear canal normal.   Left Ear: Hearing, tympanic membrane, external ear and ear canal normal.   Nose: Nose normal. No mucosal edema, rhinorrhea or nasal deformity. No epistaxis. Right sinus exhibits no maxillary sinus tenderness and no frontal sinus tenderness. Left sinus exhibits no maxillary sinus tenderness and " no frontal sinus tenderness.   Mouth/Throat: Uvula is midline, oropharynx is clear and moist and mucous membranes are normal. No trismus in the jaw. Normal dentition. No uvula swelling. No posterior oropharyngeal erythema.   Eyes: Conjunctivae and lids are normal. Right eye exhibits no discharge. Left eye exhibits no discharge. No scleral icterus.   Sclera clear bilat   Neck: Trachea normal, normal range of motion, full passive range of motion without pain and phonation normal. Neck supple.   Cardiovascular: Normal rate, regular rhythm, normal heart sounds, intact distal pulses and normal pulses.   Pulmonary/Chest: Effort normal and breath sounds normal. No respiratory distress.   Abdominal: Soft. Normal appearance and bowel sounds are normal. He exhibits no distension, no pulsatile midline mass and no mass. There is no tenderness.   Musculoskeletal: Normal range of motion. He exhibits no edema or deformity.   Neurological: He is alert and oriented to person, place, and time. He exhibits normal muscle tone. Coordination normal.   Skin: Skin is warm, dry and intact. He is not diaphoretic. No pallor.   Psychiatric: He has a normal mood and affect. His speech is normal and behavior is normal. Judgment and thought content normal. Cognition and memory are normal.   Nursing note and vitals reviewed.      Assessment:       1. Bacterial sinusitis        Plan:       Shree Heredia is a 68 year old female presenting to the clinic with c/o sinus symptoms for 3 weeks. He has a history of sinus infections and is not improving with symptomatic treatment so this is now likely bacterial sinusitis instead of viral. Do not suspect pneumonia, AOM, sepsis, meningitis. Will treat with Augmentin and refer to ENT. Patient appears well hydrated and nontoxic. Continue OTC medications and sinus treatment.   Bacterial sinusitis    Other orders  -     amoxicillin-clavulanate 875-125mg (AUGMENTIN) 875-125 mg per tablet; Take 1 tablet by mouth 2  (two) times daily. for 7 days  Dispense: 14 tablet; Refill: 0

## 2019-02-11 NOTE — PATIENT INSTRUCTIONS
[facility]  Address: [address] [place] [address]  Phone: [phone]      Sinusitis (Antibiotic Treatment)    The sinuses are air-filled spaces within the bones of the face. They connect to the inside of the nose. Sinusitis is an inflammation of the tissue lining the sinus cavity. Sinus inflammation can occur during a cold. It can also be due to allergies to pollens and other particles in the air. Sinusitis can cause symptoms of sinus congestion and fullness. A sinus infection causes fever, headache and facial pain. There is often green or yellow drainage from the nose or into the back of the throat (post-nasal drip). You have been given antibiotics to treat this condition.  Home care:  · Take the full course of antibiotics as instructed. Do not stop taking them, even if you feel better.  · Drink plenty of water, hot tea, and other liquids. This may help thin mucus. It also may promote sinus drainage.  · Heat may help soothe painful areas of the face. Use a towel soaked in hot water. Or,  the shower and direct the hot spray onto your face. Using a vaporizer along with a menthol rub at night may also help.   · An expectorant containing guaifenesin may help thin the mucus and promote drainage from the sinuses.  · Over-the-counter decongestants may be used unless a similar medicine was prescribed. Nasal sprays work the fastest. Use one that contains phenylephrine or oxymetazoline. First blow the nose gently. Then use the spray. Do not use these medicines more often than directed on the label or symptoms may get worse. You may also use tablets containing pseudoephedrine. Avoid products that combine ingredients, because side effects may be increased. Read labels. You can also ask the pharmacist for help. (NOTE: Persons with high blood pressure should not use decongestants. They can raise blood pressure.)  · Over-the-counter antihistamines may help if  allergies contributed to your sinusitis.    · Do not use nasal rinses or irrigation during an acute sinus infection, unless told to by your health care provider. Rinsing may spread the infection to other sinuses.  · Use acetaminophen or ibuprofen to control pain, unless another pain medicine was prescribed. (If you have chronic liver or kidney disease or ever had a stomach ulcer, talk with your doctor before using these medicines. Aspirin should never be used in anyone under 18 years of age who is ill with a fever. It may cause severe liver damage.)  · Don't smoke. This can worsen symptoms.  Follow-up care  Follow up with your healthcare provider or our staff if you are not improving within the next week.  When to seek medical advice  Call your healthcare provider if any of these occur:  · Facial pain or headache becoming more severe  · Stiff neck  · Unusual drowsiness or confusion  · Swelling of the forehead or eyelids  · Vision problems, including blurred or double vision  · Fever of 100.4ºF (38ºC) or higher, or as directed by your healthcare provider  · Seizure  · Breathing problems  · Symptoms not resolving within 10 days  Date Last Reviewed: 4/13/2015  © 9345-3368 [employer]. [address] [place] [address], [place] [address]. All rights reserved. This information is not intended as a substitute for professional medical care. Always follow your healthcare professional's instructions.

## 2019-02-14 DIAGNOSIS — E79.0 HYPERURICEMIA: ICD-10-CM

## 2019-02-14 RX ORDER — ALLOPURINOL 100 MG/1
TABLET ORAL
Qty: 60 TABLET | Refills: 11 | Status: SHIPPED | OUTPATIENT
Start: 2019-02-14 | End: 2020-02-13

## 2019-05-08 DIAGNOSIS — E78.5 HYPERLIPIDEMIA: ICD-10-CM

## 2019-05-08 RX ORDER — EZETIMIBE 10 MG/1
TABLET ORAL
Qty: 90 TABLET | Refills: 3 | Status: SHIPPED | OUTPATIENT
Start: 2019-05-08 | End: 2020-05-24

## 2019-05-21 ENCOUNTER — TELEPHONE (OUTPATIENT)
Dept: UROLOGY | Facility: CLINIC | Age: 69
End: 2019-05-21

## 2019-05-21 DIAGNOSIS — E78.5 HYPERLIPIDEMIA: ICD-10-CM

## 2019-05-21 NOTE — TELEPHONE ENCOUNTER
----- Message from Rosalia Guidry sent at 5/21/2019 11:07 AM CDT -----  Contact: pt  Calling to see if it is time to have labs and have other questions and please advise. [phone] (home)

## 2019-05-22 RX ORDER — OMEGA-3-ACID ETHYL ESTERS 1 G/1
CAPSULE, LIQUID FILLED ORAL
Qty: 360 CAPSULE | Refills: 3 | Status: SHIPPED | OUTPATIENT
Start: 2019-05-22 | End: 2020-05-24

## 2019-07-18 ENCOUNTER — PATIENT OUTREACH (OUTPATIENT)
Dept: ADMINISTRATIVE | Facility: HOSPITAL | Age: 69
End: 2019-07-18

## 2019-07-26 ENCOUNTER — LAB VISIT (OUTPATIENT)
Dept: LAB | Facility: HOSPITAL | Age: 69
End: 2019-07-26
Attending: UROLOGY
Payer: MEDICARE

## 2019-07-26 DIAGNOSIS — C61 PROSTATE CANCER: ICD-10-CM

## 2019-07-26 DIAGNOSIS — D49.59 NEOPLASM OF PROSTATE: ICD-10-CM

## 2019-07-26 PROCEDURE — 85025 COMPLETE CBC W/AUTO DIFF WBC: CPT

## 2019-07-26 PROCEDURE — 84153 ASSAY OF PSA TOTAL: CPT

## 2019-07-26 PROCEDURE — 36415 COLL VENOUS BLD VENIPUNCTURE: CPT | Mod: PO

## 2019-07-27 LAB
BASOPHILS # BLD AUTO: 0.04 K/UL (ref 0–0.2)
BASOPHILS NFR BLD: 0.7 % (ref 0–1.9)
COMPLEXED PSA SERPL-MCNC: 0.47 NG/ML (ref 0–4)
DIFFERENTIAL METHOD: ABNORMAL
EOSINOPHIL # BLD AUTO: 0.2 K/UL (ref 0–0.5)
EOSINOPHIL NFR BLD: 2.8 % (ref 0–8)
ERYTHROCYTE [DISTWIDTH] IN BLOOD BY AUTOMATED COUNT: 13.3 % (ref 11.5–14.5)
HCT VFR BLD AUTO: 49.2 % (ref 40–54)
HGB BLD-MCNC: 15.3 G/DL (ref 14–18)
IMM GRANULOCYTES # BLD AUTO: 0.01 K/UL (ref 0–0.04)
IMM GRANULOCYTES NFR BLD AUTO: 0.2 % (ref 0–0.5)
LYMPHOCYTES # BLD AUTO: 1.5 K/UL (ref 1–4.8)
LYMPHOCYTES NFR BLD: 27.9 % (ref 18–48)
MCH RBC QN AUTO: 29.5 PG (ref 27–31)
MCHC RBC AUTO-ENTMCNC: 31.1 G/DL (ref 32–36)
MCV RBC AUTO: 95 FL (ref 82–98)
MONOCYTES # BLD AUTO: 0.5 K/UL (ref 0.3–1)
MONOCYTES NFR BLD: 9 % (ref 4–15)
NEUTROPHILS # BLD AUTO: 3.2 K/UL (ref 1.8–7.7)
NEUTROPHILS NFR BLD: 59.4 % (ref 38–73)
NRBC BLD-RTO: 0 /100 WBC
PLATELET # BLD AUTO: 199 K/UL (ref 150–350)
PMV BLD AUTO: 10.8 FL (ref 9.2–12.9)
RBC # BLD AUTO: 5.18 M/UL (ref 4.6–6.2)
WBC # BLD AUTO: 5.35 K/UL (ref 3.9–12.7)

## 2019-07-30 ENCOUNTER — DOCUMENTATION ONLY (OUTPATIENT)
Dept: FAMILY MEDICINE | Facility: CLINIC | Age: 69
End: 2019-07-30

## 2019-07-30 NOTE — PROGRESS NOTES
Pre-Visit Chart Review  For Appointment Scheduled on 8/1/2019.       Health Maintenance Due   Topic Date Due    TETANUS VACCINE  03/20/1968    Pneumococcal Vaccine (65+ High/Highest Risk) (2 of 2 - PCV13) 02/01/2019

## 2019-08-01 ENCOUNTER — OFFICE VISIT (OUTPATIENT)
Dept: UROLOGY | Facility: CLINIC | Age: 69
End: 2019-08-01
Payer: MEDICARE

## 2019-08-01 ENCOUNTER — OFFICE VISIT (OUTPATIENT)
Dept: FAMILY MEDICINE | Facility: CLINIC | Age: 69
End: 2019-08-01
Payer: MEDICARE

## 2019-08-01 VITALS
TEMPERATURE: 98 F | HEART RATE: 61 BPM | WEIGHT: 166 LBS | SYSTOLIC BLOOD PRESSURE: 124 MMHG | BODY MASS INDEX: 26.79 KG/M2 | DIASTOLIC BLOOD PRESSURE: 71 MMHG

## 2019-08-01 VITALS
SYSTOLIC BLOOD PRESSURE: 112 MMHG | BODY MASS INDEX: 26.68 KG/M2 | HEIGHT: 66 IN | WEIGHT: 166 LBS | HEART RATE: 71 BPM | TEMPERATURE: 98 F | DIASTOLIC BLOOD PRESSURE: 72 MMHG

## 2019-08-01 DIAGNOSIS — R39.9 LOWER URINARY TRACT SYMPTOMS (LUTS): ICD-10-CM

## 2019-08-01 DIAGNOSIS — C61 PROSTATE CANCER: Primary | ICD-10-CM

## 2019-08-01 DIAGNOSIS — C61 PROSTATE CANCER: ICD-10-CM

## 2019-08-01 DIAGNOSIS — E79.0 HYPERURICEMIA: ICD-10-CM

## 2019-08-01 DIAGNOSIS — E78.2 MIXED HYPERLIPIDEMIA: Primary | ICD-10-CM

## 2019-08-01 PROCEDURE — 81000 URINALYSIS NONAUTO W/SCOPE: CPT | Mod: PBBFAC,PN | Performed by: UROLOGY

## 2019-08-01 PROCEDURE — 99213 OFFICE O/P EST LOW 20 MIN: CPT | Mod: S$PBB,,, | Performed by: FAMILY MEDICINE

## 2019-08-01 PROCEDURE — 99999 PR PBB SHADOW E&M-EST. PATIENT-LVL III: ICD-10-PCS | Mod: PBBFAC,,, | Performed by: FAMILY MEDICINE

## 2019-08-01 PROCEDURE — 99213 PR OFFICE/OUTPT VISIT, EST, LEVL III, 20-29 MIN: ICD-10-PCS | Mod: S$PBB,,, | Performed by: FAMILY MEDICINE

## 2019-08-01 PROCEDURE — 99214 PR OFFICE/OUTPT VISIT, EST, LEVL IV, 30-39 MIN: ICD-10-PCS | Mod: 25,S$PBB,, | Performed by: UROLOGY

## 2019-08-01 PROCEDURE — 99999 PR PBB SHADOW E&M-EST. PATIENT-LVL III: CPT | Mod: PBBFAC,,, | Performed by: FAMILY MEDICINE

## 2019-08-01 PROCEDURE — 99213 OFFICE O/P EST LOW 20 MIN: CPT | Mod: PBBFAC,27,PO | Performed by: FAMILY MEDICINE

## 2019-08-01 PROCEDURE — 99213 OFFICE O/P EST LOW 20 MIN: CPT | Mod: PBBFAC,PN | Performed by: UROLOGY

## 2019-08-01 PROCEDURE — 99214 OFFICE O/P EST MOD 30 MIN: CPT | Mod: 25,S$PBB,, | Performed by: UROLOGY

## 2019-08-01 PROCEDURE — 99999 PR PBB SHADOW E&M-EST. PATIENT-LVL III: ICD-10-PCS | Mod: PBBFAC,,, | Performed by: UROLOGY

## 2019-08-01 PROCEDURE — 99999 PR PBB SHADOW E&M-EST. PATIENT-LVL III: CPT | Mod: PBBFAC,,, | Performed by: UROLOGY

## 2019-08-01 NOTE — PROGRESS NOTES
OFFICE NOTE  [unfilled]  [id number]  8/1/2019       CHIEF COMPLAINT:  Adenocarcinoma prostate, lower urinary tract symptoms     HISTORY OF PRESENT ILLNESS:  This 69-year-old male who returns recheck. He has a history of adenocarcinoma prostate for which he underwent brachytherapy in September 2018 by Dr. Dobbs.  He also has lower urinary tract symptoms for which he takes Flomax with which he does well.  He did try getting off the Flomax but the symptoms recurred and he resumed taking it.    Medical history update reveals no change since his last visit of 02/01/2019    Physical exam:  Abdomen - soft benign nontender no masses no hernias no organomegaly                             External genitalia - normal phallus with adequate meatus testes descended and feel normal no scrotal masses.                             Rectal - smooth firmed flattened prostatic area, no nodules         PSA - 0.47 on 07/26/2019 that compares to 0.82 on 12/11/2018     UA - negative pH 5.0     FINAL IMPRESSION:   Adenocarcinoma prostate, lower urinary tract symptoms     RECOMMENDATIONS:   Continue on Flomax 0.4 mg p.o. Q.d..                                           Recheck 6 months with repeat PSA

## 2019-08-02 ENCOUNTER — LAB VISIT (OUTPATIENT)
Dept: LAB | Facility: HOSPITAL | Age: 69
End: 2019-08-02
Attending: FAMILY MEDICINE
Payer: MEDICARE

## 2019-08-02 DIAGNOSIS — E78.2 MIXED HYPERLIPIDEMIA: ICD-10-CM

## 2019-08-02 LAB
ALBUMIN SERPL BCP-MCNC: 3.5 G/DL (ref 3.5–5.2)
ALP SERPL-CCNC: 58 U/L (ref 55–135)
ALT SERPL W/O P-5'-P-CCNC: 22 U/L (ref 10–44)
ANION GAP SERPL CALC-SCNC: 7 MMOL/L (ref 8–16)
AST SERPL-CCNC: 30 U/L (ref 10–40)
BILIRUB SERPL-MCNC: 0.5 MG/DL (ref 0.1–1)
BUN SERPL-MCNC: 14 MG/DL (ref 8–23)
CALCIUM SERPL-MCNC: 9.3 MG/DL (ref 8.7–10.5)
CHLORIDE SERPL-SCNC: 106 MMOL/L (ref 95–110)
CHOLEST SERPL-MCNC: 208 MG/DL (ref 120–199)
CHOLEST/HDLC SERPL: 4.6 {RATIO} (ref 2–5)
CO2 SERPL-SCNC: 28 MMOL/L (ref 23–29)
CREAT SERPL-MCNC: 1.1 MG/DL (ref 0.5–1.4)
EST. GFR  (AFRICAN AMERICAN): >60 ML/MIN/1.73 M^2
EST. GFR  (NON AFRICAN AMERICAN): >60 ML/MIN/1.73 M^2
GLUCOSE SERPL-MCNC: 79 MG/DL (ref 70–110)
HDLC SERPL-MCNC: 45 MG/DL (ref 40–75)
HDLC SERPL: 21.6 % (ref 20–50)
LDLC SERPL CALC-MCNC: 140 MG/DL (ref 63–159)
NONHDLC SERPL-MCNC: 163 MG/DL
POTASSIUM SERPL-SCNC: 4.1 MMOL/L (ref 3.5–5.1)
PROT SERPL-MCNC: 6.7 G/DL (ref 6–8.4)
SODIUM SERPL-SCNC: 141 MMOL/L (ref 136–145)
TRIGL SERPL-MCNC: 115 MG/DL (ref 30–150)

## 2019-08-02 PROCEDURE — 80061 LIPID PANEL: CPT

## 2019-08-02 PROCEDURE — 36415 COLL VENOUS BLD VENIPUNCTURE: CPT | Mod: PO

## 2019-08-02 PROCEDURE — 80053 COMPREHEN METABOLIC PANEL: CPT

## 2019-08-04 NOTE — PROGRESS NOTES
Subjective:       Patient ID: Shree Heredia is a 69 y.o. male.    Chief Complaint: Hyperlipidemia and Hyperuricemia    Patient presents here for annual follow-up of hyperlipidemia and hyperuricemia.  He states he has been doing well and has no new complaints at this time.  Since I saw him last, he was diagnosed with prostate cancer in May of 2018 and underwent placement of radioactive seeds in September of 2018.  His PSA has continued to decrease and his latest 1 last week was 0.74.  He has no complaints related to his prostate or urinary system.  As far as his hyperlipidemia, these have been well controlled on his present medications and diet.  He is due to have his lipid profile done and this will be done at a later date when the patient is fasting.  He is tolerating his medications well.  As far as his hyperuricemia, this is also been stable without any acute flares of gout or any other issues related to hyperuricemia.  As far as health maintenance, he is up-to-date with all of his recommended screening exams and immunizations except that he does need to update his tetanus vaccine and get a shingles vaccine.    Hyperlipidemia   This is a chronic problem. The problem is controlled. Recent lipid tests were reviewed and are normal. Pertinent negatives include no chest pain or shortness of breath. Current antihyperlipidemic treatment includes ezetimibe (Lovaza). The current treatment provides moderate improvement of lipids. There are no compliance problems.  Risk factors for coronary artery disease include dyslipidemia and male sex.     Review of Systems   Constitutional: Negative for chills, fatigue, fever and unexpected weight change.   HENT: Negative for congestion, ear pain, postnasal drip and sore throat.    Respiratory: Negative for cough, shortness of breath and wheezing.    Cardiovascular: Negative for chest pain and palpitations.   Gastrointestinal: Negative for abdominal pain, diarrhea, nausea and  vomiting.   Genitourinary: Negative for difficulty urinating, dysuria, flank pain and frequency.   Musculoskeletal: Negative for arthralgias and back pain.   Neurological: Negative for dizziness, syncope, light-headedness and headaches.   Hematological: Negative for adenopathy. Does not bruise/bleed easily.   Psychiatric/Behavioral: Negative for sleep disturbance. The patient is not nervous/anxious.        Objective:      Physical Exam   Constitutional: He is oriented to person, place, and time. He appears well-developed and well-nourished. No distress.   HENT:   Head: Normocephalic and atraumatic.   Right Ear: External ear normal.   Left Ear: External ear normal.   Nose: Nose normal.   Mouth/Throat: Oropharynx is clear and moist.   Neck: Normal range of motion. Neck supple. No thyromegaly present.   Cardiovascular: Normal rate, regular rhythm, normal heart sounds and intact distal pulses.   No murmur heard.  Pulmonary/Chest: Effort normal and breath sounds normal. He has no wheezes. He has no rales.   Abdominal: Soft. Bowel sounds are normal. There is no tenderness. There is no rebound and no guarding.   Musculoskeletal: Normal range of motion. He exhibits no edema.   Lymphadenopathy:     He has no cervical adenopathy.   Neurological: He is alert and oriented to person, place, and time. He has normal reflexes. No cranial nerve deficit.   Psychiatric: He has a normal mood and affect. His behavior is normal.   Vitals reviewed.      Assessment:       1. Mixed hyperlipidemia    2. Prostate cancer    3. Hyperuricemia        Plan:       1.  Continue present medication as his hyperlipidemia and hyperuricemia well controlled  2.  CMP and lipid profile    3.  Continue low-sodium, low-fat low-cholesterol diet and exercise  4.  Continue follow-up with Urology  5.  Follow up with me in 1 year or p.r.n.

## 2019-11-20 ENCOUNTER — CLINICAL SUPPORT (OUTPATIENT)
Dept: URGENT CARE | Facility: CLINIC | Age: 69
End: 2019-11-20
Payer: MEDICARE

## 2019-11-20 VITALS
RESPIRATION RATE: 16 BRPM | HEIGHT: 66 IN | SYSTOLIC BLOOD PRESSURE: 118 MMHG | BODY MASS INDEX: 27.16 KG/M2 | TEMPERATURE: 98 F | WEIGHT: 169 LBS | OXYGEN SATURATION: 97 % | HEART RATE: 69 BPM | DIASTOLIC BLOOD PRESSURE: 73 MMHG

## 2019-11-20 DIAGNOSIS — J01.90 ACUTE NON-RECURRENT SINUSITIS, UNSPECIFIED LOCATION: Primary | ICD-10-CM

## 2019-11-20 PROCEDURE — 99214 PR OFFICE/OUTPT VISIT, EST, LEVL IV, 30-39 MIN: ICD-10-PCS | Mod: 25,S$GLB,, | Performed by: NURSE PRACTITIONER

## 2019-11-20 PROCEDURE — 96372 THER/PROPH/DIAG INJ SC/IM: CPT | Mod: S$GLB,,, | Performed by: NURSE PRACTITIONER

## 2019-11-20 PROCEDURE — 96372 PR INJECTION,THERAP/PROPH/DIAG2ST, IM OR SUBCUT: ICD-10-PCS | Mod: S$GLB,,, | Performed by: NURSE PRACTITIONER

## 2019-11-20 PROCEDURE — 99214 OFFICE O/P EST MOD 30 MIN: CPT | Mod: 25,S$GLB,, | Performed by: NURSE PRACTITIONER

## 2019-11-20 RX ORDER — PREDNISONE 20 MG/1
20 TABLET ORAL 2 TIMES DAILY
Qty: 10 TABLET | Refills: 0 | Status: SHIPPED | OUTPATIENT
Start: 2019-11-20 | End: 2019-11-25

## 2019-11-20 RX ORDER — AMOXICILLIN AND CLAVULANATE POTASSIUM 875; 125 MG/1; MG/1
1 TABLET, FILM COATED ORAL 2 TIMES DAILY
Qty: 20 TABLET | Refills: 0 | Status: SHIPPED | OUTPATIENT
Start: 2019-11-20 | End: 2019-11-30

## 2019-11-20 RX ORDER — FLUTICASONE PROPIONATE 50 MCG
2 SPRAY, SUSPENSION (ML) NASAL 2 TIMES DAILY
Qty: 1 BOTTLE | Refills: 0 | Status: SHIPPED | OUTPATIENT
Start: 2019-11-20 | End: 2020-05-12 | Stop reason: ALTCHOICE

## 2019-11-20 RX ORDER — CETIRIZINE HYDROCHLORIDE 10 MG/1
10 TABLET ORAL DAILY
Qty: 30 TABLET | Refills: 2 | Status: SHIPPED | OUTPATIENT
Start: 2019-11-20 | End: 2020-05-12 | Stop reason: ALTCHOICE

## 2019-11-20 RX ORDER — DEXAMETHASONE SODIUM PHOSPHATE 4 MG/ML
8 INJECTION, SOLUTION INTRA-ARTICULAR; INTRALESIONAL; INTRAMUSCULAR; INTRAVENOUS; SOFT TISSUE
Status: COMPLETED | OUTPATIENT
Start: 2019-11-20 | End: 2019-11-20

## 2019-11-20 RX ADMIN — DEXAMETHASONE SODIUM PHOSPHATE 8 MG: 4 INJECTION, SOLUTION INTRA-ARTICULAR; INTRALESIONAL; INTRAMUSCULAR; INTRAVENOUS; SOFT TISSUE at 12:11

## 2019-11-20 NOTE — PATIENT INSTRUCTIONS
Symptomatic treatment:    Alternate Tylenol and Ibuprofen every 3 hrs for fever, pain and inflammation. Avoid Nsaids if you are pregnant or have advanced kidney disease.     salt water gargles to soothe throat from post nasal drip  Honey/lemon water or warm tea to soothe throat from post nasal drip  Cepachol helps to soothe the discomfort in throat from post nasal drip    Cold-eeze helps to reduce the duration of URI symptoms if taken early  Elderberry to reduce duration of viral URI symptoms    Nasal saline spray reduces inflammation and dryness  Warm face compresses/hot showers as often as you can to open sinuses and allow to drain.   Flonase OTC or Nasacort OTC to help decrease inflammation in nasal turbinates and allow sinuses to drain    Vicks vapor rub at night  Simple foods like chicken noodle soup help provide hydration and nutrition    Delsym helps with coughing at night    Zantac will help if there is reflux from the post nasal drip and helpful to take at night    Zyrtec/Claritin during the day and Benadryl at night may help if allergy component concurrently with URI    Rest as much as you can    Your symptoms will likely last 5-7 days, maybe longer depending on how it affects your body.  You are contagious 5-7, so minimize contact with others to reduce the spread to others and stay home from work or school as we discussed. Dehydration is preventable but is one of the main reasons why you will feel so badly. Drink pedialyte, gatorade or propel. Stay hydrated.  Antibiotics are not needed unless a complication(such as Otitis Media, Bacterial sinus infection or pneumonia) develops. Taking antibiotics for Flu/Cold is not supported by evidence-based medicine and can expose you to unnecessary side effects of the medication, such as anaphylaxis, yeast infection and leads to antibiotic resistance.   If you experience any:  Chest pain, shortness of breath, wheezing or difficulty breathing,  Severe headache, face,  neck or ear pain,  New rash,  Fever over 101.5º F (38.6 C) for more than three days,  Confusion, behavior change or seizure,  Severe weakness or dizziness, please go to the ER immediately for further testing.           POST NASAL DRIP  Postnasal drip is a condition that causes a large amount of mucus to collect in your throat or nose. It may also be called upper airway cough syndrome because the mucus causes repeated coughing. You may have a sore throat, or throat tissues may swell. This may feel like a lump in your throat. You may also feel like you need to clear your throat often.    Manage postnasal drip:  Use a humidifier or vaporizer. Use a cool mist humidifier or a vaporizer to increase air moisture in your home. This may make it easier for you to breathe.  Drink more liquids as directed. Liquids help keep your air passages moist and help you cough up mucus. Ask how much liquid to drink each day and which liquids are best for you.  Sleep on propped up pillows, to keep the mucus from collecting at the back of your throat  Nasal irrigation (available over-the-counter)  Avoid cold air and dry, heated air. Cold or dry air can trigger postnasal drip. Try to stay inside on cold days, or keep your mouth covered. Do not stay long in areas that have dry, heated air.  Do not smoke, and avoid secondhand smoke. Nicotine and other chemicals in cigarettes and cigars can irritate your throat and make coughing worse. Ask your healthcare provider for information if you currently smoke and need help to quit. E-cigarettes or smokeless tobacco still contain nicotine. Talk to your healthcare provider before you use these products.    Medications  Medicines may be given to thin the mucus. You may need to swallow the medicine or use a device to flush your sinuses with liquid squirted into your nose. Nasal sprays may also be needed to keep the tissues in your nose moist. Medicines can also relieve congestion. Allergy medicine may  "help if your symptoms are caused by seasonal allergies, such as hay fever. You may need medicine to help control GERD.  Take your medicine as directed. Contact your healthcare provider if you think your medicine is not helping or if you have side effects. Tell him or her if you are allergic to any medicine. Keep a list of the medicines, vitamins, and herbs you take. Include the amounts, and when and why you take them. Bring the list or the pill bottles to follow-up visits. Carry your medicine list with you in case of an emergency.  A oral decongestant, such as pseudoephedrine (as in Sudafed) or phenylephrine (as in Sudafed PE or Anthony-Synephrine)  Guaifenesin (as in Mucinex), a medication that can thin the mucus  An anti-histamine, such as  diphenhydramine, as in Benadryl, chlorpheniramine, as in Chlor-Trimeton, loratadine, as in Claritin or Alavert, fexofenadine (Allegra), cetirizine (Zyrtec), levocetirizine (Xyzal), desloratadine (Clarinex)  A nasal decongestant such as oxymetazoline (contained in Afrin) which constricts blood vessels in the nasal passages; this leads to less secretions. Such medications should only be taken for a day or two; longer-term use can cause more harm than good)  Keep in mind that many of these medications are combined in over-the-counter products. For example, there are several formulations of "Sudafed" containing pseudoephedrine or phenylephrine along with additional drugs including acetaminophen, dextromethorphan, and guaifenesin. While these combinations can be effective, it's important to read the label and avoid taking too much of any active ingredient.  What about prescription treatments?  If these approaches aren't effective, prescription treatments may be the next best steps, including:  A nasal steroid spray (such as beclomethasone/Beconase or triamcinolone/Nasacort)  Ipratropium (Atrovent) nasal spray which inhibits secretions (such as mucus)  Other treatments depend on the " cause of the post-nasal drip. Antibiotics are not usually helpful so they aren't usually prescribed for post-nasal drip (unless the symptoms are due to bacterial infection of the sinuses). For allergies, dusting and vacuuming often, covering your mattresses and pillowcases, and special air filter can help reduce exposure to allergy triggers.    What about chicken soup?  If you've been told that chicken soup helps with post-nasal drip (or other symptoms of a cold or flu), it's true! But it doesn't actually have to be chicken soup - any hot liquid can help thin the mucus and help you maintain hydration.    When should I call a doctor?  In most cases, post-nasal drip is annoying but not dangerous. However, you should contact your doctor if you have:  Unexplained fever  Bloody mucus  Wheezing or shortness of breath  Foul smelling drainage  Persistent symptoms despite treatment  The bad news/good news about post nasal drip  Post-nasal drip is among the most common causes of persistent cough, hoarseness, sore throat and other annoying symptoms. It can be caused by a number of conditions and may linger for weeks or months. That's the bad news. The good news is that most of the causes can be quickly identified and most will improve with treatment.      Acute Sinusitis    Acute sinusitis is irritation and swelling of the sinuses. It is usually caused by a viral infection after a common cold. Your doctor can help you find relief.  What is acute sinusitis?  Sinuses are air-filled spaces in the skull behind the face. They are kept moist and clean by a lining of mucosa. Things such as pollen, smoke, and chemical fumes can irritate the mucosa. It can then swell up. As a response to irritation, the mucosa makes more mucus and other fluids. Tiny hairlike cilia cover the mucosa. Cilia help carry mucus toward the opening of the sinus. Too much mucus may cause the cilia to stop working. This blocks the sinus opening. A buildup of  fluid in the sinuses then causes pain and pressure. It can also encourage bacteria to grow in the sinuses.  Common symptoms of acute sinusitis  You may have:  · Facial soreness pain  · Headache  · Fever  · Fluid draining in the back of the throat (postnasal drip)  · Congestion  · Drainage that is thick and colored, instead of clear  · Cough  Diagnosing acute sinusitis  Your doctor will ask about your symptoms and health history. He or she will look at your ear, nose, and throat. You usually won't need to have X-rays taken.    The doctor may take a sample of mucus to check for bacteria. If you have sinusitis that keeps coming back, you may need imaging tests such as X-rays or CAT scans. This will help your doctor check for a structural problem that may be causing the infection.  Treating acute sinusitis  Treatment is aimed at unblocking the sinus opening and helping the cilia work again. You may need to take antihistamine and decongestant medicine. These can reduce inflammation and decrease the amount of fluid your sinuses make. If you have a bacterial infection, you will need to take antibiotic medicine for 10 to 14 days. Take this medicine until it is gone, even if you feel better.  Date Last Reviewed: 10/1/2016  © 7077-7109 [employer] [address] [place] [address], [place] [address]. All rights reserved. This information is not intended as a substitute for professional medical care. Always follow your healthcare professional's instructions.

## 2019-11-20 NOTE — PROGRESS NOTES
"Subjective:       Patient ID: Shree Heredia is a 69 y.o. male.    Vitals:  height is 5' 6" (1.676 m) and weight is 76.7 kg (169 lb). His oral temperature is 98.1 °F (36.7 °C). His blood pressure is 118/73 and his pulse is 69. His respiration is 16 and oxygen saturation is 97%.     Chief Complaint: Sinus Problem    Sinus Problem   This is a new problem. The current episode started 1 to 4 weeks ago (worse the past 48 hours ). The problem has been gradually worsening since onset. Associated symptoms include chills, congestion, coughing, headaches, sinus pressure and a sore throat. Pertinent negatives include no shortness of breath. (Stuffy nose) Treatments tried: OTC sinus, zyrtec.       Constitution: Positive for chills and fatigue. Negative for fever.   HENT: Positive for congestion, postnasal drip, sinus pain, sinus pressure and sore throat.    Neck: Negative for painful lymph nodes.   Cardiovascular: Negative for chest pain and leg swelling.   Eyes: Negative for double vision and blurred vision.   Respiratory: Positive for cough. Negative for shortness of breath.    Gastrointestinal: Negative for nausea, vomiting and diarrhea.   Genitourinary: Negative for dysuria, frequency and urgency.   Musculoskeletal: Negative for joint pain, joint swelling, muscle cramps and muscle ache.   Skin: Negative for color change, pale and rash.   Allergic/Immunologic: Negative for seasonal allergies.   Neurological: Positive for headaches. Negative for dizziness, history of vertigo, light-headedness and passing out.   Hematologic/Lymphatic: Negative for swollen lymph nodes, easy bruising/bleeding and history of blood clots. Does not bruise/bleed easily.   Psychiatric/Behavioral: Negative for nervous/anxious, sleep disturbance and depression. The patient is not nervous/anxious.        Objective:      Physical Exam   Constitutional: He is oriented to person, place, and time. He appears well-developed and well-nourished. He is " cooperative.  Non-toxic appearance. He does not appear ill. No distress.   HENT:   Head: Normocephalic and atraumatic.   Right Ear: Hearing, external ear and ear canal normal. A middle ear effusion is present.   Left Ear: Hearing, external ear and ear canal normal. A middle ear effusion is present.   Nose: Mucosal edema and rhinorrhea present. No nasal deformity. No epistaxis. Right sinus exhibits no maxillary sinus tenderness and no frontal sinus tenderness. Left sinus exhibits no maxillary sinus tenderness and no frontal sinus tenderness.   Mouth/Throat: Uvula is midline and mucous membranes are normal. No trismus in the jaw. Normal dentition. No uvula swelling. Posterior oropharyngeal erythema and cobblestoning present.   Eyes: Conjunctivae and lids are normal. Right eye exhibits no discharge. Left eye exhibits no discharge. No scleral icterus.   Neck: Trachea normal, normal range of motion, full passive range of motion without pain and phonation normal. Neck supple.   Cardiovascular: Normal rate, regular rhythm, normal heart sounds, intact distal pulses and normal pulses.   Pulmonary/Chest: Effort normal and breath sounds normal. No respiratory distress.   Abdominal: Soft. Normal appearance and bowel sounds are normal. He exhibits no distension, no pulsatile midline mass and no mass. There is no tenderness.   Musculoskeletal: Normal range of motion. He exhibits no edema or deformity.   Neurological: He is alert and oriented to person, place, and time. He exhibits normal muscle tone. Coordination normal.   Skin: Skin is warm, dry, intact, not diaphoretic and not pale.   Psychiatric: He has a normal mood and affect. His speech is normal and behavior is normal. Judgment and thought content normal. Cognition and memory are normal.   Nursing note and vitals reviewed.        Assessment:       1. Acute non-recurrent sinusitis, unspecified location        Plan:         Acute non-recurrent sinusitis, unspecified  location    Other orders  -     dexamethasone injection 8 mg  -     amoxicillin-clavulanate 875-125mg (AUGMENTIN) 875-125 mg per tablet; Take 1 tablet by mouth 2 (two) times daily. for 10 days  Dispense: 20 tablet; Refill: 0  -     cetirizine (ZYRTEC) 10 MG tablet; Take 1 tablet (10 mg total) by mouth once daily.  Dispense: 30 tablet; Refill: 2  -     fluticasone propionate (FLONASE) 50 mcg/actuation nasal spray; 2 sprays (100 mcg total) by Each Nostril route 2 (two) times daily.  Dispense: 1 Bottle; Refill: 0  -     predniSONE (DELTASONE) 20 MG tablet; Take 1 tablet (20 mg total) by mouth 2 (two) times daily. for 5 days  Dispense: 10 tablet; Refill: 0  -     dexchlorphen-phenylephrine-DM 1-5-10 mg/5 mL Syrp; Take 5 mLs by mouth every 4 (four) hours as needed.  Dispense: 480 mL; Refill: 0

## 2019-11-29 DIAGNOSIS — M10.00 ACUTE IDIOPATHIC GOUT, UNSPECIFIED SITE: ICD-10-CM

## 2019-11-29 RX ORDER — COLCHICINE 0.6 MG/1
TABLET ORAL
Qty: 90 TABLET | Refills: 3 | Status: SHIPPED | OUTPATIENT
Start: 2019-11-29 | End: 2020-12-02

## 2020-01-02 RX ORDER — TAMSULOSIN HYDROCHLORIDE 0.4 MG/1
0.4 CAPSULE ORAL DAILY
Qty: 90 CAPSULE | Refills: 3 | Status: SHIPPED | OUTPATIENT
Start: 2020-01-02 | End: 2020-03-16 | Stop reason: SDUPTHER

## 2020-01-02 RX ORDER — AZELASTINE 1 MG/ML
1 SPRAY, METERED NASAL 2 TIMES DAILY
Qty: 30 ML | Refills: 11 | Status: SHIPPED | OUTPATIENT
Start: 2020-01-02 | End: 2020-08-06

## 2020-01-02 NOTE — TELEPHONE ENCOUNTER
----- Message from Mel Vazquez sent at 1/2/2020  3:06 PM CST -----  Contact: Patient  Patient called to schedule his 6 month f/u from recall letter he received, no availability until March.   Please call to let patient know if anything sooner is available: [phone]

## 2020-01-02 NOTE — TELEPHONE ENCOUNTER
----- Message from Mel Vazquze sent at 1/2/2020  3:09 PM CST -----  Contact: Patient  Type:  RX Refill Request    Who Called:  Patient  Refill or New Rx:  Refill  RX Name and Strength:  tamsulosin (FLOMAX) 0.4 mg Cap  How is the patient currently taking it? (ex. 1XDay):  1XDay  Is this a 30 day or 90 day RX:  90  Preferred Pharmacy with phone number:  [id number] - [place], [place] - [address]  Local or Mail Order:  local  Ordering Provider:  Fabiola Martinez Call Back Number: [phone]  Additional Information:

## 2020-01-14 ENCOUNTER — LAB VISIT (OUTPATIENT)
Dept: LAB | Facility: HOSPITAL | Age: 70
End: 2020-01-14
Attending: UROLOGY
Payer: MEDICARE

## 2020-01-14 DIAGNOSIS — C61 PROSTATE CANCER: ICD-10-CM

## 2020-01-14 LAB — COMPLEXED PSA SERPL-MCNC: 0.56 NG/ML (ref 0–4)

## 2020-01-14 PROCEDURE — 36415 COLL VENOUS BLD VENIPUNCTURE: CPT | Mod: PO

## 2020-01-14 PROCEDURE — 84153 ASSAY OF PSA TOTAL: CPT

## 2020-01-17 ENCOUNTER — OFFICE VISIT (OUTPATIENT)
Dept: UROLOGY | Facility: CLINIC | Age: 70
End: 2020-01-17
Payer: MEDICARE

## 2020-01-17 VITALS
DIASTOLIC BLOOD PRESSURE: 76 MMHG | RESPIRATION RATE: 18 BRPM | WEIGHT: 175.69 LBS | HEIGHT: 66 IN | HEART RATE: 79 BPM | TEMPERATURE: 98 F | SYSTOLIC BLOOD PRESSURE: 133 MMHG | BODY MASS INDEX: 28.23 KG/M2

## 2020-01-17 DIAGNOSIS — C61 PROSTATE CANCER: Primary | ICD-10-CM

## 2020-01-17 DIAGNOSIS — R39.9 LOWER URINARY TRACT SYMPTOMS (LUTS): ICD-10-CM

## 2020-01-17 PROCEDURE — 99214 PR OFFICE/OUTPT VISIT, EST, LEVL IV, 30-39 MIN: ICD-10-PCS | Mod: 25,S$PBB,, | Performed by: UROLOGY

## 2020-01-17 PROCEDURE — 1126F PR PAIN SEVERITY QUANTIFIED, NO PAIN PRESENT: ICD-10-PCS | Mod: ,,, | Performed by: UROLOGY

## 2020-01-17 PROCEDURE — 1159F MED LIST DOCD IN RCRD: CPT | Mod: ,,, | Performed by: UROLOGY

## 2020-01-17 PROCEDURE — 99999 PR PBB SHADOW E&M-EST. PATIENT-LVL III: ICD-10-PCS | Mod: PBBFAC,,, | Performed by: UROLOGY

## 2020-01-17 PROCEDURE — 81002 URINALYSIS NONAUTO W/O SCOPE: CPT | Mod: PBBFAC,PN | Performed by: UROLOGY

## 2020-01-17 PROCEDURE — 99214 OFFICE O/P EST MOD 30 MIN: CPT | Mod: 25,S$PBB,, | Performed by: UROLOGY

## 2020-01-17 PROCEDURE — 99213 OFFICE O/P EST LOW 20 MIN: CPT | Mod: PBBFAC,PN | Performed by: UROLOGY

## 2020-01-17 PROCEDURE — 81000 URINALYSIS NONAUTO W/SCOPE: CPT | Mod: PBBFAC,PN | Performed by: UROLOGY

## 2020-01-17 PROCEDURE — 1159F PR MEDICATION LIST DOCUMENTED IN MEDICAL RECORD: ICD-10-PCS | Mod: ,,, | Performed by: UROLOGY

## 2020-01-17 PROCEDURE — 99999 PR PBB SHADOW E&M-EST. PATIENT-LVL III: CPT | Mod: PBBFAC,,, | Performed by: UROLOGY

## 2020-01-17 PROCEDURE — 1126F AMNT PAIN NOTED NONE PRSNT: CPT | Mod: ,,, | Performed by: UROLOGY

## 2020-01-17 NOTE — PROGRESS NOTES
OFFICE NOTE  [unfilled]  [id number]  1/17/2020       CHIEF COMPLAINT:   adenocarcinoma prostate, BPH     HISTORY OF PRESENT ILLNESS:   this 69-year-old male returns routine recheck.  He has history of adenocarcinoma prostate for which he underwent brachytherapy in September 2018 by Dr. Dobbs and so far has shown no evidence of any recurrences.  He also has BP type urine lower tract symptoms for which he takes Flomax 0.4 mg p.o. q.d. with which he does well.  Denies any specific complaints on today's visit he states he is feeling very well.    No changes medical history since his last visit of 08/01/2019    Physical exam:  Abdomen - soft benign nontender no masses no hernias no organomegaly                             External genitalia - normal phallus with adequate meatus testes descended and feel normal no scrotal mass                             Rectal -  smooth firm flattened prostate no nodules normal sphincter tone       PSA  - 0.56 on 01/14/2020 that compares to 0.47 on 07/26/2019 and 0.82 on 12/11/2018     UA - negative pH 5.5     FINAL IMPRESSION:   adenocarcinoma prostate, BPH lower urine tract symptoms     RECOMMENDATIONS:  Continue on Flomax 0.4 g p.o. Q.d.                                           Recheck 6 months with repeat PSA.

## 2020-02-13 DIAGNOSIS — E79.0 HYPERURICEMIA: ICD-10-CM

## 2020-02-13 RX ORDER — ALLOPURINOL 100 MG/1
TABLET ORAL
Qty: 180 TABLET | Refills: 3 | Status: SHIPPED | OUTPATIENT
Start: 2020-02-13 | End: 2021-02-25 | Stop reason: SDUPTHER

## 2020-03-16 RX ORDER — TAMSULOSIN HYDROCHLORIDE 0.4 MG/1
0.4 CAPSULE ORAL DAILY
Qty: 90 CAPSULE | Refills: 3 | Status: SHIPPED | OUTPATIENT
Start: 2020-03-16 | End: 2021-03-03

## 2020-03-16 NOTE — TELEPHONE ENCOUNTER
----- Message from Princess ROSEMARY Navarrete sent at 3/16/2020  2:33 PM CDT -----  Contact: Diana Heredia (Spouse)  Type:  RX Refill Request    Who Called:  Dianaelissa Heredia (Spouse)  Refill or New Rx:  Refill  RX Name and Strength:  tamsulosin (FLOMAX) 0.4 mg Cap  How is the patient currently taking it? (ex. 1XDay):  Route: Take 1 capsule (0.4 mg total) by mouth once daily  Is this a 30 day or 90 day RX:  30  Preferred Pharmacy with phone number:    [facility]/pharmacy #[id number] - [place]  [address]  [address] [address]  Phone: [phone] Fax: [phone]  Local or Mail Order:  Local  Ordering Provider:  Dr. Fabiola Martinez Call Back Number:  [phone]  Additional Information:

## 2020-03-16 NOTE — TELEPHONE ENCOUNTER
----- Message from Princess ROSEMARY Navarrete sent at 3/16/2020  2:33 PM CDT -----  Contact: Diana Heredia (Spouse)  Type:  RX Refill Request    Who Called:  Dianaelissa Heredia (Spouse)  Refill or New Rx:  Refill  RX Name and Strength:  tamsulosin (FLOMAX) 0.4 mg Cap  How is the patient currently taking it? (ex. 1XDay):  Route: Take 1 capsule (0.4 mg total) by mouth once daily  Is this a 30 day or 90 day RX:  30  Preferred Pharmacy with phone number:    [facility]/pharmacy #[id number] - [place]  [address]  [address] [address]  Phone: [phone] Fax: [phone]  Local or Mail Order:  Local  Ordering Provider:  Dr. Fabiola Martinez Call Back Number:  [phone]  Additional Information:

## 2020-05-11 ENCOUNTER — TELEPHONE (OUTPATIENT)
Dept: UROLOGY | Facility: CLINIC | Age: 70
End: 2020-05-11

## 2020-05-11 NOTE — TELEPHONE ENCOUNTER
----- Message from Liudmila Beauchamp sent at 5/11/2020  9:57 AM CDT -----  Contact: Patient  Type: Needs Medical Advice  Who Called:  Patient  Symptoms (please be specific): frequent urination,slight irritation while urinating and a small amount of blood in urine   How long has patient had these symptoms:  about a couple of days  Pharmacy name and phone #:  na  [facility] Call Back Number: [phone]  Additional Information: Please call to advise and schedule apt.  Thanks!

## 2020-05-12 ENCOUNTER — OFFICE VISIT (OUTPATIENT)
Dept: UROLOGY | Facility: CLINIC | Age: 70
End: 2020-05-12
Payer: MEDICARE

## 2020-05-12 VITALS
RESPIRATION RATE: 18 BRPM | HEART RATE: 69 BPM | HEIGHT: 66 IN | TEMPERATURE: 99 F | SYSTOLIC BLOOD PRESSURE: 131 MMHG | BODY MASS INDEX: 27.39 KG/M2 | DIASTOLIC BLOOD PRESSURE: 73 MMHG | WEIGHT: 170.44 LBS

## 2020-05-12 DIAGNOSIS — R39.9 LOWER URINARY TRACT SYMPTOMS (LUTS): ICD-10-CM

## 2020-05-12 DIAGNOSIS — C61 PROSTATE CANCER: ICD-10-CM

## 2020-05-12 DIAGNOSIS — R31.0 GROSS HEMATURIA: Primary | ICD-10-CM

## 2020-05-12 DIAGNOSIS — N40.1 HYPERPLASIA OF PROSTATE WITH LOWER URINARY TRACT SYMPTOMS (LUTS): ICD-10-CM

## 2020-05-12 LAB
BILIRUB SERPL-MCNC: ABNORMAL MG/DL
BLOOD URINE, POC: ABNORMAL
COLOR, POC UA: YELLOW
GLUCOSE UR QL STRIP: ABNORMAL
KETONES UR QL STRIP: ABNORMAL
LEUKOCYTE ESTERASE URINE, POC: ABNORMAL
NITRITE, POC UA: ABNORMAL
PH, POC UA: 5
PROTEIN, POC: ABNORMAL
SPECIFIC GRAVITY, POC UA: 1.03
UROBILINOGEN, POC UA: 0.2

## 2020-05-12 PROCEDURE — 88112 CYTOPATH CELL ENHANCE TECH: CPT | Performed by: PATHOLOGY

## 2020-05-12 PROCEDURE — 87086 URINE CULTURE/COLONY COUNT: CPT

## 2020-05-12 PROCEDURE — 88112 PR  CYTOPATH, CELL ENHANCE TECH: ICD-10-PCS | Mod: 26,,, | Performed by: PATHOLOGY

## 2020-05-12 PROCEDURE — 99213 OFFICE O/P EST LOW 20 MIN: CPT | Mod: PBBFAC,PN | Performed by: UROLOGY

## 2020-05-12 PROCEDURE — 99214 OFFICE O/P EST MOD 30 MIN: CPT | Mod: 25,S$PBB,, | Performed by: UROLOGY

## 2020-05-12 PROCEDURE — 81002 URINALYSIS NONAUTO W/O SCOPE: CPT | Mod: PBBFAC,PN | Performed by: UROLOGY

## 2020-05-12 PROCEDURE — 99999 PR PBB SHADOW E&M-EST. PATIENT-LVL III: ICD-10-PCS | Mod: PBBFAC,,, | Performed by: UROLOGY

## 2020-05-12 PROCEDURE — 81000 URINALYSIS NONAUTO W/SCOPE: CPT | Mod: PBBFAC,PN | Performed by: UROLOGY

## 2020-05-12 PROCEDURE — 99214 PR OFFICE/OUTPT VISIT, EST, LEVL IV, 30-39 MIN: ICD-10-PCS | Mod: 25,S$PBB,, | Performed by: UROLOGY

## 2020-05-12 PROCEDURE — 99999 PR PBB SHADOW E&M-EST. PATIENT-LVL III: CPT | Mod: PBBFAC,,, | Performed by: UROLOGY

## 2020-05-12 PROCEDURE — 88112 CYTOPATH CELL ENHANCE TECH: CPT | Mod: 26,,, | Performed by: PATHOLOGY

## 2020-05-12 RX ORDER — SULFAMETHOXAZOLE AND TRIMETHOPRIM 800; 160 MG/1; MG/1
1 TABLET ORAL 2 TIMES DAILY
Qty: 30 TABLET | Refills: 0 | Status: SHIPPED | OUTPATIENT
Start: 2020-05-12 | End: 2020-08-06

## 2020-05-12 NOTE — PROGRESS NOTES
OFFICE NOTE  [unfilled]  [id number]  5/12/2020       CHIEF COMPLAINT:   gross hematuria, adenocarcinoma prostate, BPH     HISTORY OF PRESENT ILLNESS:   this 70-year-old male presents with a 3 to four-day history of noticing gross hematuria with some urethral irritation the gross hematuria has since resolved.  He has history of adenocarcinoma prostate for which he underwent brachytherapy in September 2018 by Dr. Dobbs and so far has shown no evidence of any recurrences.  His last PSA was 0.56 on 01/14/2020.  He also has a history of BPH which continues to take Flomax with which he has been doing well.    No change in medical history since his last visit    Physical exam:  Abdomen - soft benign nontender no masses no hernias no organomegaly                             External genitalia - normal phallus with adequate meatus testes descended and feel normal no scrotal mass                             Rectal -some increased firmness and prominence to the left lobe       PSA  - 0.56 on 01/14/2020     UA -n moderate amount of blood with pH 5.0     FINAL IMPRESSION:  Hematuria, adenocarcinoma prostate, BPH, lower urinary tract symptoms       RECOMMENDATIONS:   urine for culture and cytology.  Trial of Bactrim DS p.o. b.i.d. pending the urine culture.  Continue on Flomax 0.4 g p.o. Q.d..  Patient will subsequently be notified of the findings and of his next appointment and also to obtain a follow-up PSA.

## 2020-05-13 LAB — BACTERIA UR CULT: NO GROWTH

## 2020-05-14 LAB — FINAL PATHOLOGIC DIAGNOSIS: ABNORMAL

## 2020-05-15 DIAGNOSIS — R31.9 HEMATURIA, UNSPECIFIED TYPE: Primary | ICD-10-CM

## 2020-05-15 DIAGNOSIS — R82.89 ABNORMAL URINE CYTOLOGY: ICD-10-CM

## 2020-05-23 DIAGNOSIS — E78.5 HYPERLIPIDEMIA: ICD-10-CM

## 2020-05-24 RX ORDER — EZETIMIBE 10 MG/1
TABLET ORAL
Qty: 90 TABLET | Refills: 3 | Status: SHIPPED | OUTPATIENT
Start: 2020-05-24 | End: 2021-05-24

## 2020-05-24 RX ORDER — OMEGA-3-ACID ETHYL ESTERS 1 G/1
CAPSULE, LIQUID FILLED ORAL
Qty: 360 CAPSULE | Refills: 3 | Status: SHIPPED | OUTPATIENT
Start: 2020-05-24 | End: 2021-05-24

## 2020-05-27 ENCOUNTER — HOSPITAL ENCOUNTER (OUTPATIENT)
Dept: RADIOLOGY | Facility: HOSPITAL | Age: 70
Discharge: HOME | End: 2020-05-27
Attending: UROLOGY
Payer: MEDICARE

## 2020-05-27 DIAGNOSIS — R82.89 ABNORMAL URINE CYTOLOGY: ICD-10-CM

## 2020-05-27 DIAGNOSIS — R31.9 HEMATURIA, UNSPECIFIED TYPE: ICD-10-CM

## 2020-05-27 LAB
CREAT SERPL-MCNC: 1.1 MG/DL (ref 0.5–1.4)
SAMPLE: NORMAL

## 2020-05-27 PROCEDURE — 25500020 PHARM REV CODE 255: Mod: PO | Performed by: UROLOGY

## 2020-05-27 PROCEDURE — 74178 CT ABD&PLV WO CNTR FLWD CNTR: CPT | Mod: TC,PO

## 2020-05-27 RX ADMIN — IOHEXOL 100 ML: 350 INJECTION, SOLUTION INTRAVENOUS at 08:05

## 2020-06-01 ENCOUNTER — TELEPHONE (OUTPATIENT)
Dept: UROLOGY | Facility: CLINIC | Age: 70
End: 2020-06-01

## 2020-06-01 NOTE — TELEPHONE ENCOUNTER
Results given with recommendation, patient states he is doing well with no blood in his urine, he will hold off right with cysto w/retro and contact us if he sees the blood again. Patient verbally understood.

## 2020-06-01 NOTE — TELEPHONE ENCOUNTER
----- Message from Seferino Hicks MD sent at 6/1/2020  2:14 PM CDT -----  CT scan - 2 mm left renal calculus, no renal mass, no other significant findings other than brachytherapy seeds in the prostate.    Rec - cysto with retrograde to complete hematuria workup

## 2020-08-06 ENCOUNTER — OFFICE VISIT (OUTPATIENT)
Dept: FAMILY MEDICINE | Facility: CLINIC | Age: 70
End: 2020-08-06
Payer: MEDICARE

## 2020-08-06 ENCOUNTER — LAB VISIT (OUTPATIENT)
Dept: LAB | Facility: HOSPITAL | Age: 70
End: 2020-08-06
Attending: FAMILY MEDICINE
Payer: MEDICARE

## 2020-08-06 VITALS
HEIGHT: 66 IN | SYSTOLIC BLOOD PRESSURE: 110 MMHG | BODY MASS INDEX: 26.58 KG/M2 | HEART RATE: 58 BPM | WEIGHT: 165.38 LBS | OXYGEN SATURATION: 96 % | DIASTOLIC BLOOD PRESSURE: 70 MMHG | TEMPERATURE: 98 F

## 2020-08-06 DIAGNOSIS — E78.2 MIXED HYPERLIPIDEMIA: Primary | ICD-10-CM

## 2020-08-06 DIAGNOSIS — E79.0 HYPERURICEMIA: ICD-10-CM

## 2020-08-06 DIAGNOSIS — E78.2 MIXED HYPERLIPIDEMIA: ICD-10-CM

## 2020-08-06 DIAGNOSIS — C61 PROSTATE CANCER: ICD-10-CM

## 2020-08-06 LAB
ALBUMIN SERPL BCP-MCNC: 3.9 G/DL (ref 3.5–5.2)
ALP SERPL-CCNC: 53 U/L (ref 55–135)
ALT SERPL W/O P-5'-P-CCNC: 26 U/L (ref 10–44)
ANION GAP SERPL CALC-SCNC: 6 MMOL/L (ref 8–16)
AST SERPL-CCNC: 32 U/L (ref 10–40)
BILIRUB SERPL-MCNC: 0.5 MG/DL (ref 0.1–1)
BUN SERPL-MCNC: 13 MG/DL (ref 8–23)
CALCIUM SERPL-MCNC: 9.8 MG/DL (ref 8.7–10.5)
CHLORIDE SERPL-SCNC: 107 MMOL/L (ref 95–110)
CHOLEST SERPL-MCNC: 231 MG/DL (ref 120–199)
CHOLEST/HDLC SERPL: 4.8 {RATIO} (ref 2–5)
CO2 SERPL-SCNC: 29 MMOL/L (ref 23–29)
COMPLEXED PSA SERPL-MCNC: 0.43 NG/ML (ref 0–4)
CREAT SERPL-MCNC: 1.3 MG/DL (ref 0.5–1.4)
EST. GFR  (AFRICAN AMERICAN): >60 ML/MIN/1.73 M^2
EST. GFR  (NON AFRICAN AMERICAN): 56.1 ML/MIN/1.73 M^2
GLUCOSE SERPL-MCNC: 84 MG/DL (ref 70–110)
HDLC SERPL-MCNC: 48 MG/DL (ref 40–75)
HDLC SERPL: 20.8 % (ref 20–50)
LDLC SERPL CALC-MCNC: 156.8 MG/DL (ref 63–159)
NONHDLC SERPL-MCNC: 183 MG/DL
POTASSIUM SERPL-SCNC: 4.9 MMOL/L (ref 3.5–5.1)
PROT SERPL-MCNC: 7.1 G/DL (ref 6–8.4)
SODIUM SERPL-SCNC: 142 MMOL/L (ref 136–145)
TRIGL SERPL-MCNC: 131 MG/DL (ref 30–150)
URATE SERPL-MCNC: 6 MG/DL (ref 3.4–7)

## 2020-08-06 PROCEDURE — 36415 COLL VENOUS BLD VENIPUNCTURE: CPT | Mod: PO

## 2020-08-06 PROCEDURE — 80053 COMPREHEN METABOLIC PANEL: CPT

## 2020-08-06 PROCEDURE — 99213 OFFICE O/P EST LOW 20 MIN: CPT | Mod: S$PBB,,, | Performed by: FAMILY MEDICINE

## 2020-08-06 PROCEDURE — 80061 LIPID PANEL: CPT

## 2020-08-06 PROCEDURE — 99214 OFFICE O/P EST MOD 30 MIN: CPT | Mod: PBBFAC,PO | Performed by: FAMILY MEDICINE

## 2020-08-06 PROCEDURE — 84153 ASSAY OF PSA TOTAL: CPT

## 2020-08-06 PROCEDURE — 84550 ASSAY OF BLOOD/URIC ACID: CPT

## 2020-08-06 PROCEDURE — 99999 PR PBB SHADOW E&M-EST. PATIENT-LVL IV: ICD-10-PCS | Mod: PBBFAC,,, | Performed by: FAMILY MEDICINE

## 2020-08-06 PROCEDURE — 99999 PR PBB SHADOW E&M-EST. PATIENT-LVL IV: CPT | Mod: PBBFAC,,, | Performed by: FAMILY MEDICINE

## 2020-08-06 PROCEDURE — 99213 PR OFFICE/OUTPT VISIT, EST, LEVL III, 20-29 MIN: ICD-10-PCS | Mod: S$PBB,,, | Performed by: FAMILY MEDICINE

## 2020-08-09 NOTE — PROGRESS NOTES
Subjective:       Patient ID: Shree Heredia is a 70 y.o. male.    Chief Complaint: Hyperlipidemia, Hyperuricemia, and Prostate Cancer    Patient presents here for annual follow-up of hyperlipidemia, hyperuricemia, and prostate cancer.  He states he has been feeling well and there have been no major changes in the last year since he was last seen.  His hyperlipidemia has been fairly well controlled with his present dose of Zetia and Lovaza.  He has been intolerant of statins in the past.  He states he is following his low-fat low-cholesterol diet and his weight remains stable.  He is exercising on a regular basis also.  His hyperuricemia has been well controlled and he has had no episodes of gout in the last year.  He is on allopurinol on a regular basis.  As far as his prostate cancer, he has been followed by Urology and this remains in remission.  He has seen an ENT, Dr. Toro, as he has been having some problems with his sinuses.  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  He states CT was done hand he has some mild to moderate disease but ENT did not recommend surgery only for loss of smell as there was no guarantee he would get it back.  I concur and I discussed this with the patient.  As far as health maintenance, he is up-to-date with all of his recommended screening exams except for tetanus vaccine, shingles vaccine, and his 2nd pneumococcal vaccine.    Hyperlipidemia  This is a chronic problem. The problem is controlled. Recent lipid tests were reviewed and are normal. Pertinent negatives include no chest pain or shortness of breath. Current antihyperlipidemic treatment includes ezetimibe (Lovaza). The current treatment provides moderate improvement of lipids. There are no compliance problems.  Risk factors for coronary artery disease include dyslipidemia and male sex.     Review of Systems   Constitutional: Negative for chills, fatigue, fever and unexpected weight  change.   HENT: Positive for sinus pressure/congestion. Negative for nasal congestion, ear pain, postnasal drip and sore throat.         Loss of smell   Eyes: Negative for pain and visual disturbance.   Respiratory: Negative for cough and shortness of breath.    Cardiovascular: Negative for chest pain and palpitations.   Gastrointestinal: Negative for abdominal pain, diarrhea, nausea and vomiting.   Genitourinary: Negative for difficulty urinating, dysuria, flank pain and frequency.        No nocturia   Musculoskeletal: Negative for arthralgias and back pain.   Neurological: Negative for dizziness, syncope, light-headedness and headaches.   Hematological: Negative for adenopathy. Does not bruise/bleed easily.   Psychiatric/Behavioral: Negative for sleep disturbance. The patient is not nervous/anxious.          Objective:      Physical Exam  Vitals signs reviewed.   Constitutional:       General: He is not in acute distress.     Appearance: Normal appearance. He is well-developed.   HENT:      Right Ear: Tympanic membrane and external ear normal.      Left Ear: Tympanic membrane and external ear normal.      Nose: Nose normal.      Mouth/Throat:      Pharynx: Oropharynx is clear. No oropharyngeal exudate.   Neck:      Musculoskeletal: Normal range of motion and neck supple.      Thyroid: No thyromegaly.   Cardiovascular:      Rate and Rhythm: Normal rate and regular rhythm.      Heart sounds: Normal heart sounds. No murmur.   Pulmonary:      Effort: Pulmonary effort is normal.      Breath sounds: Normal breath sounds. No wheezing or rales.   Abdominal:      General: Bowel sounds are normal.      Palpations: Abdomen is soft.      Tenderness: There is no abdominal tenderness. There is no guarding or rebound.   Musculoskeletal: Normal range of motion.      Right lower leg: No edema.      Left lower leg: No edema.   Lymphadenopathy:      Cervical: No cervical adenopathy.   Neurological:      General: No focal deficit  present.      Mental Status: He is alert and oriented to person, place, and time.      Cranial Nerves: No cranial nerve deficit.      Deep Tendon Reflexes: Reflexes are normal and symmetric.         Assessment:       1. Mixed hyperlipidemia    2. Hyperuricemia    3. Prostate cancer        Plan:       1.  Continue present medication as his hyperlipidemia, hyperuricemia, and prostate cancer are stable and controlled  2.  Continue low-sodium, low-fat low-cholesterol diet and exercise  3.  CMP, lipid profile, uric acid  4.  Follow up with me in 1 year or p.r.n.

## 2020-08-10 DIAGNOSIS — E78.2 MIXED HYPERLIPIDEMIA: Primary | ICD-10-CM

## 2020-11-10 ENCOUNTER — LAB VISIT (OUTPATIENT)
Dept: LAB | Facility: HOSPITAL | Age: 70
End: 2020-11-10
Attending: FAMILY MEDICINE
Payer: MEDICARE

## 2020-11-10 DIAGNOSIS — E78.2 MIXED HYPERLIPIDEMIA: ICD-10-CM

## 2020-11-10 LAB
CHOLEST SERPL-MCNC: 215 MG/DL (ref 120–199)
CHOLEST/HDLC SERPL: 4.4 {RATIO} (ref 2–5)
HDLC SERPL-MCNC: 49 MG/DL (ref 40–75)
HDLC SERPL: 22.8 % (ref 20–50)
LDLC SERPL CALC-MCNC: 138.8 MG/DL (ref 63–159)
NONHDLC SERPL-MCNC: 166 MG/DL
TRIGL SERPL-MCNC: 136 MG/DL (ref 30–150)

## 2020-11-10 PROCEDURE — 36415 COLL VENOUS BLD VENIPUNCTURE: CPT | Mod: PO

## 2020-11-10 PROCEDURE — 80061 LIPID PANEL: CPT

## 2020-11-22 RX ORDER — AZELASTINE 1 MG/ML
SPRAY, METERED NASAL
Qty: 30 ML | Refills: 11 | Status: SHIPPED | OUTPATIENT
Start: 2020-11-22 | End: 2022-01-11

## 2020-12-02 DIAGNOSIS — M10.00 ACUTE IDIOPATHIC GOUT, UNSPECIFIED SITE: ICD-10-CM

## 2020-12-02 RX ORDER — COLCHICINE 0.6 MG/1
TABLET ORAL
Qty: 90 TABLET | Refills: 3 | Status: SHIPPED | OUTPATIENT
Start: 2020-12-02 | End: 2021-11-22

## 2021-02-02 ENCOUNTER — OFFICE VISIT (OUTPATIENT)
Dept: URGENT CARE | Facility: CLINIC | Age: 71
End: 2021-02-02
Payer: MEDICARE

## 2021-02-02 VITALS
SYSTOLIC BLOOD PRESSURE: 117 MMHG | HEIGHT: 66 IN | WEIGHT: 172 LBS | BODY MASS INDEX: 27.64 KG/M2 | TEMPERATURE: 98 F | DIASTOLIC BLOOD PRESSURE: 73 MMHG | OXYGEN SATURATION: 98 % | RESPIRATION RATE: 16 BRPM | HEART RATE: 68 BPM

## 2021-02-02 DIAGNOSIS — Z20.822 COVID-19 VIRUS NOT DETECTED: ICD-10-CM

## 2021-02-02 DIAGNOSIS — J01.91 ACUTE RECURRENT SINUSITIS, UNSPECIFIED LOCATION: Primary | ICD-10-CM

## 2021-02-02 LAB
CTP QC/QA: YES
SARS-COV-2 RDRP RESP QL NAA+PROBE: NEGATIVE

## 2021-02-02 PROCEDURE — 99214 PR OFFICE/OUTPT VISIT, EST, LEVL IV, 30-39 MIN: ICD-10-PCS | Mod: 25,S$GLB,CS, | Performed by: NURSE PRACTITIONER

## 2021-02-02 PROCEDURE — 96372 THER/PROPH/DIAG INJ SC/IM: CPT | Mod: S$GLB,,, | Performed by: EMERGENCY MEDICINE

## 2021-02-02 PROCEDURE — U0002: ICD-10-PCS | Mod: QW,CR,S$GLB, | Performed by: NURSE PRACTITIONER

## 2021-02-02 PROCEDURE — 96372 PR INJECTION,THERAP/PROPH/DIAG2ST, IM OR SUBCUT: ICD-10-PCS | Mod: S$GLB,,, | Performed by: EMERGENCY MEDICINE

## 2021-02-02 PROCEDURE — 99214 OFFICE O/P EST MOD 30 MIN: CPT | Mod: 25,S$GLB,CS, | Performed by: NURSE PRACTITIONER

## 2021-02-02 PROCEDURE — U0002 COVID-19 LAB TEST NON-CDC: HCPCS | Mod: QW,CR,S$GLB, | Performed by: NURSE PRACTITIONER

## 2021-02-02 RX ORDER — DOXYCYCLINE 100 MG/1
100 CAPSULE ORAL 2 TIMES DAILY
Qty: 20 CAPSULE | Refills: 0 | Status: SHIPPED | OUTPATIENT
Start: 2021-02-02 | End: 2021-02-12

## 2021-02-02 RX ORDER — DEXAMETHASONE SODIUM PHOSPHATE 4 MG/ML
8 INJECTION, SOLUTION INTRA-ARTICULAR; INTRALESIONAL; INTRAMUSCULAR; INTRAVENOUS; SOFT TISSUE
Status: COMPLETED | OUTPATIENT
Start: 2021-02-02 | End: 2021-02-02

## 2021-02-02 RX ORDER — PREDNISONE 20 MG/1
20 TABLET ORAL 2 TIMES DAILY
Qty: 10 TABLET | Refills: 0 | Status: SHIPPED | OUTPATIENT
Start: 2021-02-02 | End: 2021-02-07

## 2021-02-02 RX ADMIN — DEXAMETHASONE SODIUM PHOSPHATE 8 MG: 4 INJECTION, SOLUTION INTRA-ARTICULAR; INTRALESIONAL; INTRAMUSCULAR; INTRAVENOUS; SOFT TISSUE at 11:02

## 2021-02-19 ENCOUNTER — PES CALL (OUTPATIENT)
Dept: ADMINISTRATIVE | Facility: CLINIC | Age: 71
End: 2021-02-19

## 2021-02-25 DIAGNOSIS — E79.0 HYPERURICEMIA: ICD-10-CM

## 2021-02-25 RX ORDER — ALLOPURINOL 100 MG/1
200 TABLET ORAL DAILY
Qty: 180 TABLET | Refills: 3 | Status: SHIPPED | OUTPATIENT
Start: 2021-02-25 | End: 2022-02-20

## 2021-03-09 ENCOUNTER — OFFICE VISIT (OUTPATIENT)
Dept: ORTHOPEDICS | Facility: CLINIC | Age: 71
End: 2021-03-09
Payer: MEDICARE

## 2021-03-09 VITALS
HEIGHT: 66 IN | SYSTOLIC BLOOD PRESSURE: 118 MMHG | DIASTOLIC BLOOD PRESSURE: 72 MMHG | WEIGHT: 165 LBS | BODY MASS INDEX: 26.52 KG/M2 | HEART RATE: 71 BPM

## 2021-03-09 DIAGNOSIS — M22.42 CHONDROMALACIA OF LEFT PATELLA: Primary | ICD-10-CM

## 2021-03-09 PROCEDURE — 20610 LARGE JOINT ASPIRATION/INJECTION: L KNEE: ICD-10-PCS | Mod: LT,S$GLB,, | Performed by: ORTHOPAEDIC SURGERY

## 2021-03-09 PROCEDURE — 20610 DRAIN/INJ JOINT/BURSA W/O US: CPT | Mod: LT,S$GLB,, | Performed by: ORTHOPAEDIC SURGERY

## 2021-03-09 PROCEDURE — 99204 OFFICE O/P NEW MOD 45 MIN: CPT | Mod: 25,S$GLB,, | Performed by: ORTHOPAEDIC SURGERY

## 2021-03-09 PROCEDURE — 99204 PR OFFICE/OUTPT VISIT, NEW, LEVL IV, 45-59 MIN: ICD-10-PCS | Mod: 25,S$GLB,, | Performed by: ORTHOPAEDIC SURGERY

## 2021-03-09 RX ORDER — METHYLPREDNISOLONE ACETATE 40 MG/ML
40 INJECTION, SUSPENSION INTRA-ARTICULAR; INTRALESIONAL; INTRAMUSCULAR; SOFT TISSUE
Status: DISCONTINUED | OUTPATIENT
Start: 2021-03-09 | End: 2021-03-09 | Stop reason: HOSPADM

## 2021-03-09 RX ADMIN — METHYLPREDNISOLONE ACETATE 40 MG: 40 INJECTION, SUSPENSION INTRA-ARTICULAR; INTRALESIONAL; INTRAMUSCULAR; SOFT TISSUE at 02:03

## 2021-04-20 ENCOUNTER — OFFICE VISIT (OUTPATIENT)
Dept: ORTHOPEDICS | Facility: CLINIC | Age: 71
End: 2021-04-20
Payer: MEDICARE

## 2021-04-20 VITALS
DIASTOLIC BLOOD PRESSURE: 72 MMHG | SYSTOLIC BLOOD PRESSURE: 124 MMHG | HEIGHT: 66 IN | WEIGHT: 165 LBS | BODY MASS INDEX: 26.52 KG/M2 | HEART RATE: 62 BPM

## 2021-04-20 DIAGNOSIS — M22.42 CHONDROMALACIA OF LEFT PATELLA: Primary | ICD-10-CM

## 2021-04-20 PROCEDURE — 99212 PR OFFICE/OUTPT VISIT, EST, LEVL II, 10-19 MIN: ICD-10-PCS | Mod: S$GLB,,, | Performed by: ORTHOPAEDIC SURGERY

## 2021-04-20 PROCEDURE — 99212 OFFICE O/P EST SF 10 MIN: CPT | Mod: S$GLB,,, | Performed by: ORTHOPAEDIC SURGERY

## 2021-05-10 ENCOUNTER — PES CALL (OUTPATIENT)
Dept: ADMINISTRATIVE | Facility: CLINIC | Age: 71
End: 2021-05-10

## 2021-05-24 DIAGNOSIS — E78.5 HYPERLIPIDEMIA: ICD-10-CM

## 2021-05-24 RX ORDER — OMEGA-3-ACID ETHYL ESTERS 1 G/1
CAPSULE, LIQUID FILLED ORAL
Qty: 360 CAPSULE | Refills: 3 | Status: SHIPPED | OUTPATIENT
Start: 2021-05-24 | End: 2022-04-23

## 2021-05-24 RX ORDER — EZETIMIBE 10 MG/1
TABLET ORAL
Qty: 90 TABLET | Refills: 3 | Status: SHIPPED | OUTPATIENT
Start: 2021-05-24 | End: 2022-03-29

## 2021-06-04 ENCOUNTER — OFFICE VISIT (OUTPATIENT)
Dept: URGENT CARE | Facility: CLINIC | Age: 71
End: 2021-06-04
Payer: MEDICARE

## 2021-06-04 VITALS
BODY MASS INDEX: 28.45 KG/M2 | HEIGHT: 66 IN | TEMPERATURE: 97 F | SYSTOLIC BLOOD PRESSURE: 114 MMHG | WEIGHT: 177 LBS | HEART RATE: 57 BPM | DIASTOLIC BLOOD PRESSURE: 68 MMHG | OXYGEN SATURATION: 98 %

## 2021-06-04 DIAGNOSIS — J01.00 ACUTE MAXILLARY SINUSITIS, RECURRENCE NOT SPECIFIED: Primary | ICD-10-CM

## 2021-06-04 PROCEDURE — 96372 PR INJECTION,THERAP/PROPH/DIAG2ST, IM OR SUBCUT: ICD-10-PCS | Mod: S$GLB,,, | Performed by: EMERGENCY MEDICINE

## 2021-06-04 PROCEDURE — 96372 THER/PROPH/DIAG INJ SC/IM: CPT | Mod: S$GLB,,, | Performed by: EMERGENCY MEDICINE

## 2021-06-04 PROCEDURE — 99214 OFFICE O/P EST MOD 30 MIN: CPT | Mod: 25,S$GLB,, | Performed by: EMERGENCY MEDICINE

## 2021-06-04 PROCEDURE — 99214 PR OFFICE/OUTPT VISIT, EST, LEVL IV, 30-39 MIN: ICD-10-PCS | Mod: 25,S$GLB,, | Performed by: EMERGENCY MEDICINE

## 2021-06-04 RX ORDER — AMOXICILLIN AND CLAVULANATE POTASSIUM 875; 125 MG/1; MG/1
1 TABLET, FILM COATED ORAL 2 TIMES DAILY
Qty: 20 TABLET | Refills: 0 | Status: SHIPPED | OUTPATIENT
Start: 2021-06-04 | End: 2021-06-14

## 2021-06-04 RX ORDER — BENZONATATE 100 MG/1
200 CAPSULE ORAL 3 TIMES DAILY PRN
Qty: 30 CAPSULE | Refills: 1 | Status: SHIPPED | OUTPATIENT
Start: 2021-06-04 | End: 2021-07-04

## 2021-06-04 RX ORDER — PREDNISONE 50 MG/1
50 TABLET ORAL DAILY
Qty: 5 TABLET | Refills: 0 | Status: SHIPPED | OUTPATIENT
Start: 2021-06-04 | End: 2021-06-09

## 2021-06-04 RX ORDER — DEXAMETHASONE SODIUM PHOSPHATE 4 MG/ML
8 INJECTION, SOLUTION INTRA-ARTICULAR; INTRALESIONAL; INTRAMUSCULAR; INTRAVENOUS; SOFT TISSUE ONCE
Status: COMPLETED | OUTPATIENT
Start: 2021-06-04 | End: 2021-06-04

## 2021-06-04 RX ADMIN — DEXAMETHASONE SODIUM PHOSPHATE 8 MG: 4 INJECTION, SOLUTION INTRA-ARTICULAR; INTRALESIONAL; INTRAMUSCULAR; INTRAVENOUS; SOFT TISSUE at 02:06

## 2021-08-11 ENCOUNTER — TELEPHONE (OUTPATIENT)
Dept: FAMILY MEDICINE | Facility: CLINIC | Age: 71
End: 2021-08-11

## 2021-08-16 ENCOUNTER — TELEPHONE (OUTPATIENT)
Dept: FAMILY MEDICINE | Facility: CLINIC | Age: 71
End: 2021-08-16

## 2021-09-09 ENCOUNTER — OFFICE VISIT (OUTPATIENT)
Dept: FAMILY MEDICINE | Facility: CLINIC | Age: 71
End: 2021-09-09
Payer: MEDICARE

## 2021-09-09 VITALS
DIASTOLIC BLOOD PRESSURE: 84 MMHG | HEIGHT: 66 IN | BODY MASS INDEX: 26.68 KG/M2 | HEART RATE: 63 BPM | SYSTOLIC BLOOD PRESSURE: 110 MMHG | WEIGHT: 166 LBS | OXYGEN SATURATION: 96 % | RESPIRATION RATE: 18 BRPM

## 2021-09-09 DIAGNOSIS — Z00.00 ANNUAL PHYSICAL EXAM: ICD-10-CM

## 2021-09-09 DIAGNOSIS — M10.9 GOUT, UNSPECIFIED CAUSE, UNSPECIFIED CHRONICITY, UNSPECIFIED SITE: ICD-10-CM

## 2021-09-09 DIAGNOSIS — Z87.19 H/O LEFT INGUINAL HERNIA REPAIR: ICD-10-CM

## 2021-09-09 DIAGNOSIS — Z12.5 SPECIAL SCREENING, PROSTATE CANCER: ICD-10-CM

## 2021-09-09 DIAGNOSIS — Z98.890 H/O LEFT INGUINAL HERNIA REPAIR: ICD-10-CM

## 2021-09-09 DIAGNOSIS — Z13.6 ENCOUNTER FOR LIPID SCREENING FOR CARDIOVASCULAR DISEASE: ICD-10-CM

## 2021-09-09 DIAGNOSIS — L25.5 DERMATITIS DUE TO PLANTS, INCLUDING POISON IVY, SUMAC, AND OAK: Primary | ICD-10-CM

## 2021-09-09 DIAGNOSIS — Z13.220 ENCOUNTER FOR LIPID SCREENING FOR CARDIOVASCULAR DISEASE: ICD-10-CM

## 2021-09-09 DIAGNOSIS — Z85.46 PERSONAL HISTORY OF PROSTATE CANCER: ICD-10-CM

## 2021-09-09 DIAGNOSIS — Z12.5 ENCOUNTER FOR SCREENING FOR MALIGNANT NEOPLASM OF PROSTATE: ICD-10-CM

## 2021-09-09 DIAGNOSIS — E78.2 MIXED HYPERLIPIDEMIA: ICD-10-CM

## 2021-09-09 DIAGNOSIS — C61 PROSTATE CANCER: ICD-10-CM

## 2021-09-09 PROCEDURE — 99214 PR OFFICE/OUTPT VISIT, EST, LEVL IV, 30-39 MIN: ICD-10-PCS | Mod: S$PBB,,, | Performed by: FAMILY MEDICINE

## 2021-09-09 PROCEDURE — 90471 IMMUNIZATION ADMIN: CPT | Mod: PBBFAC,PO

## 2021-09-09 PROCEDURE — 99999 PR PBB SHADOW E&M-EST. PATIENT-LVL IV: ICD-10-PCS | Mod: PBBFAC,,, | Performed by: FAMILY MEDICINE

## 2021-09-09 PROCEDURE — 99214 OFFICE O/P EST MOD 30 MIN: CPT | Mod: S$PBB,,, | Performed by: FAMILY MEDICINE

## 2021-09-09 PROCEDURE — 99214 OFFICE O/P EST MOD 30 MIN: CPT | Mod: 25,PBBFAC,PO | Performed by: FAMILY MEDICINE

## 2021-09-09 PROCEDURE — 99999 PR PBB SHADOW E&M-EST. PATIENT-LVL IV: CPT | Mod: PBBFAC,,, | Performed by: FAMILY MEDICINE

## 2021-09-09 RX ORDER — PREDNISONE 10 MG/1
10 TABLET ORAL DAILY
Qty: 5 TABLET | Refills: 0 | Status: SHIPPED | OUTPATIENT
Start: 2021-09-09 | End: 2021-09-14

## 2021-09-09 RX ORDER — TRIAMCINOLONE ACETONIDE 5 MG/G
OINTMENT TOPICAL 2 TIMES DAILY
Qty: 15 G | Refills: 1 | Status: SHIPPED | OUTPATIENT
Start: 2021-09-09 | End: 2022-08-02 | Stop reason: ALTCHOICE

## 2021-09-10 ENCOUNTER — LAB VISIT (OUTPATIENT)
Dept: LAB | Facility: HOSPITAL | Age: 71
End: 2021-09-10
Attending: FAMILY MEDICINE
Payer: MEDICARE

## 2021-09-10 DIAGNOSIS — Z85.46 PERSONAL HISTORY OF PROSTATE CANCER: ICD-10-CM

## 2021-09-10 DIAGNOSIS — Z00.00 ANNUAL PHYSICAL EXAM: ICD-10-CM

## 2021-09-10 DIAGNOSIS — Z12.5 ENCOUNTER FOR SCREENING FOR MALIGNANT NEOPLASM OF PROSTATE: ICD-10-CM

## 2021-09-10 DIAGNOSIS — M10.9 GOUT, UNSPECIFIED CAUSE, UNSPECIFIED CHRONICITY, UNSPECIFIED SITE: ICD-10-CM

## 2021-09-10 DIAGNOSIS — Z13.6 ENCOUNTER FOR LIPID SCREENING FOR CARDIOVASCULAR DISEASE: ICD-10-CM

## 2021-09-10 DIAGNOSIS — Z13.220 ENCOUNTER FOR LIPID SCREENING FOR CARDIOVASCULAR DISEASE: ICD-10-CM

## 2021-09-10 LAB
ALBUMIN SERPL BCP-MCNC: 3.7 G/DL (ref 3.5–5.2)
ALP SERPL-CCNC: 56 U/L (ref 55–135)
ALT SERPL W/O P-5'-P-CCNC: 24 U/L (ref 10–44)
ANION GAP SERPL CALC-SCNC: 8 MMOL/L (ref 8–16)
AST SERPL-CCNC: 29 U/L (ref 10–40)
BASOPHILS # BLD AUTO: 0.05 K/UL (ref 0–0.2)
BASOPHILS NFR BLD: 0.9 % (ref 0–1.9)
BILIRUB SERPL-MCNC: 0.6 MG/DL (ref 0.1–1)
BUN SERPL-MCNC: 14 MG/DL (ref 8–23)
CALCIUM SERPL-MCNC: 9.6 MG/DL (ref 8.7–10.5)
CHLORIDE SERPL-SCNC: 106 MMOL/L (ref 95–110)
CHOLEST SERPL-MCNC: 202 MG/DL (ref 120–199)
CHOLEST/HDLC SERPL: 4.6 {RATIO} (ref 2–5)
CO2 SERPL-SCNC: 25 MMOL/L (ref 23–29)
COMPLEXED PSA SERPL-MCNC: 0.25 NG/ML (ref 0–4)
CREAT SERPL-MCNC: 1.2 MG/DL (ref 0.5–1.4)
DIFFERENTIAL METHOD: NORMAL
EOSINOPHIL # BLD AUTO: 0.3 K/UL (ref 0–0.5)
EOSINOPHIL NFR BLD: 4.4 % (ref 0–8)
ERYTHROCYTE [DISTWIDTH] IN BLOOD BY AUTOMATED COUNT: 13.2 % (ref 11.5–14.5)
EST. GFR  (AFRICAN AMERICAN): >60 ML/MIN/1.73 M^2
EST. GFR  (NON AFRICAN AMERICAN): >60 ML/MIN/1.73 M^2
GLUCOSE SERPL-MCNC: 84 MG/DL (ref 70–110)
HCT VFR BLD AUTO: 47 % (ref 40–54)
HDLC SERPL-MCNC: 44 MG/DL (ref 40–75)
HDLC SERPL: 21.8 % (ref 20–50)
HGB BLD-MCNC: 15.8 G/DL (ref 14–18)
IMM GRANULOCYTES # BLD AUTO: 0.01 K/UL (ref 0–0.04)
IMM GRANULOCYTES NFR BLD AUTO: 0.2 % (ref 0–0.5)
LDLC SERPL CALC-MCNC: 132.6 MG/DL (ref 63–159)
LYMPHOCYTES # BLD AUTO: 1.3 K/UL (ref 1–4.8)
LYMPHOCYTES NFR BLD: 22.6 % (ref 18–48)
MCH RBC QN AUTO: 30.6 PG (ref 27–31)
MCHC RBC AUTO-ENTMCNC: 33.6 G/DL (ref 32–36)
MCV RBC AUTO: 91 FL (ref 82–98)
MONOCYTES # BLD AUTO: 0.4 K/UL (ref 0.3–1)
MONOCYTES NFR BLD: 7.7 % (ref 4–15)
NEUTROPHILS # BLD AUTO: 3.7 K/UL (ref 1.8–7.7)
NEUTROPHILS NFR BLD: 64.2 % (ref 38–73)
NONHDLC SERPL-MCNC: 158 MG/DL
NRBC BLD-RTO: 0 /100 WBC
PLATELET # BLD AUTO: 219 K/UL (ref 150–450)
PMV BLD AUTO: 10 FL (ref 9.2–12.9)
POTASSIUM SERPL-SCNC: 4.3 MMOL/L (ref 3.5–5.1)
PROT SERPL-MCNC: 6.7 G/DL (ref 6–8.4)
RBC # BLD AUTO: 5.17 M/UL (ref 4.6–6.2)
SODIUM SERPL-SCNC: 139 MMOL/L (ref 136–145)
TRIGL SERPL-MCNC: 127 MG/DL (ref 30–150)
URATE SERPL-MCNC: 5.3 MG/DL (ref 3.4–7)
WBC # BLD AUTO: 5.72 K/UL (ref 3.9–12.7)

## 2021-09-10 PROCEDURE — 84153 ASSAY OF PSA TOTAL: CPT | Performed by: FAMILY MEDICINE

## 2021-09-10 PROCEDURE — 84550 ASSAY OF BLOOD/URIC ACID: CPT | Performed by: FAMILY MEDICINE

## 2021-09-10 PROCEDURE — 36415 COLL VENOUS BLD VENIPUNCTURE: CPT | Mod: PO | Performed by: FAMILY MEDICINE

## 2021-09-10 PROCEDURE — 80053 COMPREHEN METABOLIC PANEL: CPT | Performed by: FAMILY MEDICINE

## 2021-09-10 PROCEDURE — 80061 LIPID PANEL: CPT | Performed by: FAMILY MEDICINE

## 2021-09-10 PROCEDURE — 85025 COMPLETE CBC W/AUTO DIFF WBC: CPT | Performed by: FAMILY MEDICINE

## 2021-09-13 ENCOUNTER — OFFICE VISIT (OUTPATIENT)
Dept: SURGERY | Facility: CLINIC | Age: 71
End: 2021-09-13
Payer: MEDICARE

## 2021-09-13 VITALS — DIASTOLIC BLOOD PRESSURE: 77 MMHG | SYSTOLIC BLOOD PRESSURE: 131 MMHG | TEMPERATURE: 97 F | HEART RATE: 80 BPM

## 2021-09-13 DIAGNOSIS — R10.32 LEFT LOWER QUADRANT PAIN: ICD-10-CM

## 2021-09-13 DIAGNOSIS — Z98.890 H/O LEFT INGUINAL HERNIA REPAIR: ICD-10-CM

## 2021-09-13 DIAGNOSIS — Z87.19 H/O LEFT INGUINAL HERNIA REPAIR: ICD-10-CM

## 2021-09-13 DIAGNOSIS — M79.89 OTHER SPECIFIED SOFT TISSUE DISORDERS: ICD-10-CM

## 2021-09-13 DIAGNOSIS — K40.90 LEFT INGUINAL HERNIA: ICD-10-CM

## 2021-09-13 DIAGNOSIS — R10.32 LEFT GROIN PAIN: Primary | ICD-10-CM

## 2021-09-13 PROCEDURE — 99203 PR OFFICE/OUTPT VISIT, NEW, LEVL III, 30-44 MIN: ICD-10-PCS | Mod: S$GLB,,, | Performed by: SURGERY

## 2021-09-13 PROCEDURE — 99203 OFFICE O/P NEW LOW 30 MIN: CPT | Mod: S$GLB,,, | Performed by: SURGERY

## 2021-09-16 ENCOUNTER — PATIENT MESSAGE (OUTPATIENT)
Dept: FAMILY MEDICINE | Facility: CLINIC | Age: 71
End: 2021-09-16

## 2021-09-17 ENCOUNTER — OFFICE VISIT (OUTPATIENT)
Dept: URGENT CARE | Facility: CLINIC | Age: 71
End: 2021-09-17
Payer: MEDICARE

## 2021-09-17 VITALS
TEMPERATURE: 98 F | HEART RATE: 61 BPM | BODY MASS INDEX: 28.22 KG/M2 | DIASTOLIC BLOOD PRESSURE: 76 MMHG | OXYGEN SATURATION: 97 % | WEIGHT: 175.63 LBS | HEIGHT: 66 IN | SYSTOLIC BLOOD PRESSURE: 125 MMHG

## 2021-09-17 DIAGNOSIS — J32.9 SINUSITIS, UNSPECIFIED CHRONICITY, UNSPECIFIED LOCATION: ICD-10-CM

## 2021-09-17 DIAGNOSIS — R09.81 SINUS CONGESTION: Primary | ICD-10-CM

## 2021-09-17 LAB
CTP QC/QA: YES
SARS-COV-2 RDRP RESP QL NAA+PROBE: NEGATIVE

## 2021-09-17 PROCEDURE — 99214 PR OFFICE/OUTPT VISIT, EST, LEVL IV, 30-39 MIN: ICD-10-PCS | Mod: S$GLB,,, | Performed by: NURSE PRACTITIONER

## 2021-09-17 PROCEDURE — U0002: ICD-10-PCS | Mod: QW,CR,S$GLB, | Performed by: NURSE PRACTITIONER

## 2021-09-17 PROCEDURE — 99214 OFFICE O/P EST MOD 30 MIN: CPT | Mod: S$GLB,,, | Performed by: NURSE PRACTITIONER

## 2021-09-17 PROCEDURE — U0002 COVID-19 LAB TEST NON-CDC: HCPCS | Mod: QW,CR,S$GLB, | Performed by: NURSE PRACTITIONER

## 2021-09-17 RX ORDER — AZELASTINE 1 MG/ML
1 SPRAY, METERED NASAL 2 TIMES DAILY
Qty: 30 ML | Refills: 0 | Status: SHIPPED | OUTPATIENT
Start: 2021-09-17 | End: 2022-01-11

## 2021-09-17 RX ORDER — AMOXICILLIN 875 MG/1
875 TABLET, FILM COATED ORAL 2 TIMES DAILY
Qty: 20 TABLET | Refills: 0 | Status: SHIPPED | OUTPATIENT
Start: 2021-09-17 | End: 2021-09-27

## 2021-09-17 RX ORDER — FLUTICASONE PROPIONATE 50 MCG
1 SPRAY, SUSPENSION (ML) NASAL DAILY
Qty: 16 G | Refills: 0 | Status: SHIPPED | OUTPATIENT
Start: 2021-09-17 | End: 2022-08-02 | Stop reason: SDUPTHER

## 2021-09-22 ENCOUNTER — TELEPHONE (OUTPATIENT)
Dept: FAMILY MEDICINE | Facility: CLINIC | Age: 71
End: 2021-09-22

## 2021-09-27 ENCOUNTER — HOSPITAL ENCOUNTER (OUTPATIENT)
Dept: RADIOLOGY | Facility: HOSPITAL | Age: 71
Discharge: HOME | End: 2021-09-27
Attending: SURGERY
Payer: MEDICARE

## 2021-09-27 DIAGNOSIS — Z98.890 H/O LEFT INGUINAL HERNIA REPAIR: ICD-10-CM

## 2021-09-27 DIAGNOSIS — M79.89 OTHER SPECIFIED SOFT TISSUE DISORDERS: ICD-10-CM

## 2021-09-27 DIAGNOSIS — R10.32 LEFT GROIN PAIN: ICD-10-CM

## 2021-09-27 DIAGNOSIS — K40.90 LEFT INGUINAL HERNIA: ICD-10-CM

## 2021-09-27 DIAGNOSIS — R10.32 LEFT LOWER QUADRANT PAIN: ICD-10-CM

## 2021-09-27 DIAGNOSIS — Z87.19 H/O LEFT INGUINAL HERNIA REPAIR: ICD-10-CM

## 2021-09-27 PROCEDURE — 76882 US LMTD JT/FCL EVL NVASC XTR: CPT | Mod: TC,PO,LT

## 2021-10-01 ENCOUNTER — PES CALL (OUTPATIENT)
Dept: ADMINISTRATIVE | Facility: CLINIC | Age: 71
End: 2021-10-01

## 2021-10-01 DIAGNOSIS — R10.32 LEFT GROIN PAIN: Primary | ICD-10-CM

## 2021-10-01 DIAGNOSIS — K40.91 UNILATERAL RECURRENT INGUINAL HERNIA WITHOUT OBSTRUCTION OR GANGRENE: ICD-10-CM

## 2021-11-14 ENCOUNTER — PATIENT OUTREACH (OUTPATIENT)
Dept: ADMINISTRATIVE | Facility: OTHER | Age: 71
End: 2021-11-14
Payer: MEDICARE

## 2021-11-15 ENCOUNTER — OFFICE VISIT (OUTPATIENT)
Dept: UROLOGY | Facility: CLINIC | Age: 71
End: 2021-11-15
Payer: MEDICARE

## 2021-11-15 VITALS
SYSTOLIC BLOOD PRESSURE: 116 MMHG | WEIGHT: 175.69 LBS | BODY MASS INDEX: 28.23 KG/M2 | HEART RATE: 61 BPM | DIASTOLIC BLOOD PRESSURE: 73 MMHG | HEIGHT: 66 IN

## 2021-11-15 DIAGNOSIS — C61 PROSTATE CANCER: Primary | ICD-10-CM

## 2021-11-15 DIAGNOSIS — R39.9 LOWER URINARY TRACT SYMPTOMS (LUTS): ICD-10-CM

## 2021-11-15 DIAGNOSIS — R31.0 GROSS HEMATURIA: ICD-10-CM

## 2021-11-15 DIAGNOSIS — N52.9 ERECTILE DYSFUNCTION, UNSPECIFIED ERECTILE DYSFUNCTION TYPE: ICD-10-CM

## 2021-11-15 PROCEDURE — 99215 OFFICE O/P EST HI 40 MIN: CPT | Mod: PBBFAC,PN | Performed by: UROLOGY

## 2021-11-15 PROCEDURE — 99999 PR PBB SHADOW E&M-EST. PATIENT-LVL V: ICD-10-PCS | Mod: PBBFAC,,, | Performed by: UROLOGY

## 2021-11-15 PROCEDURE — 99214 PR OFFICE/OUTPT VISIT, EST, LEVL IV, 30-39 MIN: ICD-10-PCS | Mod: S$PBB,,, | Performed by: UROLOGY

## 2021-11-15 PROCEDURE — 99214 OFFICE O/P EST MOD 30 MIN: CPT | Mod: S$PBB,,, | Performed by: UROLOGY

## 2021-11-15 PROCEDURE — 99999 PR PBB SHADOW E&M-EST. PATIENT-LVL V: CPT | Mod: PBBFAC,,, | Performed by: UROLOGY

## 2021-11-15 RX ORDER — HYDROCORTISONE ACETATE PRAMOXINE HCL 2.5; 1 G/100G; G/100G
1 CREAM TOPICAL
COMMUNITY
End: 2022-08-02

## 2021-11-15 RX ORDER — OXYCODONE AND ACETAMINOPHEN 5; 325 MG/1; MG/1
TABLET ORAL
COMMUNITY
End: 2022-08-02 | Stop reason: ALTCHOICE

## 2021-12-01 ENCOUNTER — HOSPITAL ENCOUNTER (OUTPATIENT)
Facility: AMBULARY SURGERY CENTER | Age: 71
Discharge: HOME | End: 2021-12-01
Attending: UROLOGY | Admitting: UROLOGY
Payer: MEDICARE

## 2021-12-01 DIAGNOSIS — C61 PROSTATE CANCER: ICD-10-CM

## 2021-12-01 DIAGNOSIS — R31.0 GROSS HEMATURIA: Primary | ICD-10-CM

## 2021-12-01 PROCEDURE — 52000 CYSTOURETHROSCOPY: CPT | Performed by: UROLOGY

## 2021-12-01 PROCEDURE — 88112 CYTOPATH CELL ENHANCE TECH: CPT | Performed by: PATHOLOGY

## 2021-12-01 PROCEDURE — 52000 PR CYSTOURETHROSCOPY: ICD-10-PCS | Mod: ,,, | Performed by: UROLOGY

## 2021-12-01 PROCEDURE — 88112 CYTOPATH CELL ENHANCE TECH: CPT | Mod: 26,,, | Performed by: PATHOLOGY

## 2021-12-01 PROCEDURE — 88112 PR  CYTOPATH, CELL ENHANCE TECH: ICD-10-PCS | Mod: 26,,, | Performed by: PATHOLOGY

## 2021-12-01 PROCEDURE — 52000 CYSTOURETHROSCOPY: CPT | Mod: ,,, | Performed by: UROLOGY

## 2021-12-01 RX ORDER — CEPHALEXIN 500 MG/1
500 CAPSULE ORAL EVERY 12 HOURS
Qty: 6 CAPSULE | Refills: 0 | Status: SHIPPED | OUTPATIENT
Start: 2021-12-01 | End: 2021-12-04

## 2021-12-01 RX ORDER — LIDOCAINE HYDROCHLORIDE 20 MG/ML
JELLY TOPICAL
Status: DISCONTINUED | OUTPATIENT
Start: 2021-12-01 | End: 2021-12-01 | Stop reason: HOSPADM

## 2021-12-01 RX ORDER — WATER 1 ML/ML
IRRIGANT IRRIGATION
Status: DISCONTINUED | OUTPATIENT
Start: 2021-12-01 | End: 2021-12-01 | Stop reason: HOSPADM

## 2021-12-03 VITALS
HEIGHT: 66 IN | DIASTOLIC BLOOD PRESSURE: 84 MMHG | SYSTOLIC BLOOD PRESSURE: 132 MMHG | OXYGEN SATURATION: 99 % | BODY MASS INDEX: 28.23 KG/M2 | RESPIRATION RATE: 17 BRPM | HEART RATE: 70 BPM | TEMPERATURE: 97 F | WEIGHT: 175.69 LBS

## 2021-12-03 LAB
FINAL PATHOLOGIC DIAGNOSIS: ABNORMAL
Lab: ABNORMAL

## 2021-12-17 ENCOUNTER — PATIENT MESSAGE (OUTPATIENT)
Dept: UROLOGY | Facility: CLINIC | Age: 71
End: 2021-12-17
Payer: MEDICARE

## 2021-12-17 ENCOUNTER — CLINICAL SUPPORT (OUTPATIENT)
Dept: UROLOGY | Facility: CLINIC | Age: 71
End: 2021-12-17
Payer: MEDICARE

## 2021-12-17 DIAGNOSIS — R31.0 GROSS HEMATURIA: Primary | ICD-10-CM

## 2021-12-17 DIAGNOSIS — R31.0 GROSS HEMATURIA: ICD-10-CM

## 2021-12-17 PROCEDURE — 99499 UNLISTED E&M SERVICE: CPT | Mod: S$PBB,,, | Performed by: UROLOGY

## 2021-12-17 PROCEDURE — 99499 NO LOS: ICD-10-PCS | Mod: S$PBB,,, | Performed by: UROLOGY

## 2021-12-17 PROCEDURE — 88112 CYTOPATH CELL ENHANCE TECH: CPT | Mod: 26,,, | Performed by: PATHOLOGY

## 2021-12-17 PROCEDURE — 88112 PR  CYTOPATH, CELL ENHANCE TECH: ICD-10-PCS | Mod: 26,,, | Performed by: PATHOLOGY

## 2021-12-17 PROCEDURE — 88112 CYTOPATH CELL ENHANCE TECH: CPT | Performed by: PATHOLOGY

## 2021-12-21 LAB
FINAL PATHOLOGIC DIAGNOSIS: NORMAL
Lab: NORMAL

## 2022-01-10 ENCOUNTER — TELEPHONE (OUTPATIENT)
Dept: FAMILY MEDICINE | Facility: CLINIC | Age: 72
End: 2022-01-10
Payer: MEDICARE

## 2022-01-10 NOTE — TELEPHONE ENCOUNTER
Patient was advised to treat symptoms with OTC medication, and to go to ER for any difficulty breathing. Please advise of any further recommendations. Thank you.             ----- Message from Casper Lane sent at 1/10/2022 11:23 AM CST -----  Contact: Self  Type: Needs Medical Advice  Who Called:  Patient  Symptoms (please be specific):  Sore throat, cough, congestion, headache, body aches  How long has patient had these symptoms:  3d  Pharmacy name and phone #:    [facility]/pharmacy #[id number] - [place] - [address]  [address] [address]  Phone: [phone] Fax: [phone]    Best Call Back Number: [phone]   Additional Information: States he tested COVID-positive on home kit. Would like to speak to office/Would like something called in for symptoms.

## 2022-01-11 ENCOUNTER — OFFICE VISIT (OUTPATIENT)
Dept: URGENT CARE | Facility: CLINIC | Age: 72
End: 2022-01-11
Payer: MEDICARE

## 2022-01-11 VITALS
DIASTOLIC BLOOD PRESSURE: 88 MMHG | RESPIRATION RATE: 14 BRPM | SYSTOLIC BLOOD PRESSURE: 151 MMHG | HEIGHT: 66 IN | TEMPERATURE: 97 F | BODY MASS INDEX: 28.12 KG/M2 | WEIGHT: 175 LBS | HEART RATE: 66 BPM | OXYGEN SATURATION: 99 %

## 2022-01-11 DIAGNOSIS — Z85.9 HISTORY OF CANCER: ICD-10-CM

## 2022-01-11 DIAGNOSIS — R09.81 SINUS CONGESTION: Primary | ICD-10-CM

## 2022-01-11 DIAGNOSIS — U07.1 COVID-19 VIRUS DETECTED: ICD-10-CM

## 2022-01-11 LAB
CTP QC/QA: YES
SARS-COV-2 AG RESP QL IA.RAPID: POSITIVE

## 2022-01-11 PROCEDURE — 99213 PR OFFICE/OUTPT VISIT, EST, LEVL III, 20-29 MIN: ICD-10-PCS | Mod: CR,S$GLB,, | Performed by: NURSE PRACTITIONER

## 2022-01-11 PROCEDURE — 87811 SARS-COV-2 COVID19 W/OPTIC: CPT | Mod: S$GLB,,, | Performed by: NURSE PRACTITIONER

## 2022-01-11 PROCEDURE — 87811 SARS CORONAVIRUS 2 ANTIGEN POCT, MANUAL READ: ICD-10-PCS | Mod: S$GLB,,, | Performed by: NURSE PRACTITIONER

## 2022-01-11 PROCEDURE — 99213 OFFICE O/P EST LOW 20 MIN: CPT | Mod: CR,S$GLB,, | Performed by: NURSE PRACTITIONER

## 2022-01-11 RX ORDER — CETIRIZINE HYDROCHLORIDE 10 MG/1
10 TABLET ORAL DAILY
Qty: 30 TABLET | Refills: 0 | Status: SHIPPED | OUTPATIENT
Start: 2022-01-11 | End: 2022-02-10

## 2022-01-11 RX ORDER — AZELASTINE 1 MG/ML
1 SPRAY, METERED NASAL 2 TIMES DAILY
Qty: 30 ML | Refills: 0 | Status: SHIPPED | OUTPATIENT
Start: 2022-01-11 | End: 2022-02-20

## 2022-01-11 RX ORDER — FLUTICASONE PROPIONATE 50 MCG
1 SPRAY, SUSPENSION (ML) NASAL DAILY
Qty: 15.8 ML | Refills: 0 | Status: SHIPPED | OUTPATIENT
Start: 2022-01-11 | End: 2023-03-13 | Stop reason: SDUPTHER

## 2022-01-11 RX ORDER — PROMETHAZINE HYDROCHLORIDE AND DEXTROMETHORPHAN HYDROBROMIDE 6.25; 15 MG/5ML; MG/5ML
5 SYRUP ORAL EVERY 4 HOURS PRN
Qty: 118 ML | Refills: 0 | Status: SHIPPED | OUTPATIENT
Start: 2022-01-11 | End: 2022-01-21

## 2022-01-11 RX ORDER — BENZONATATE 100 MG/1
100 CAPSULE ORAL 3 TIMES DAILY PRN
Qty: 30 CAPSULE | Refills: 0 | Status: SHIPPED | OUTPATIENT
Start: 2022-01-11 | End: 2022-01-21

## 2022-01-11 NOTE — PROGRESS NOTES
"Subjective:       Patient ID: Shree Heredia is a 71 y.o. male.    Vitals:  height is 5' 6" (1.676 m) and weight is 79.4 kg (175 lb). His temperature is 97.1 °F (36.2 °C). His blood pressure is 151/88 (abnormal) and his pulse is 66. His respiration is 14 and oxygen saturation is 99%.     Chief Complaint: Sinus Problem    Sinus congestion, cough, pt.states he gets yearly sinus infections.  Has been taking Mucinex, and sinex for 4 weeks.      Constitution: Negative for fever.   HENT: Positive for congestion, postnasal drip and sore throat (mild, scratchy).    Cardiovascular: Negative for chest pain.   Respiratory: Positive for cough (mild, infrequent). Negative for chest tightness and shortness of breath.    Gastrointestinal: Negative for abdominal pain, nausea, vomiting and diarrhea.       Objective:      Physical Exam   Constitutional: He is oriented to person, place, and time. He appears well-developed.  Non-toxic appearance. He does not appear ill. No distress.   HENT:   Head: Normocephalic and atraumatic.   Ears:   Right Ear: Tympanic membrane normal.   Left Ear: Tympanic membrane normal.   Nose: Nose normal.   Mouth/Throat: Oropharynx is clear and moist.   Eyes: Conjunctivae and EOM are normal. Right eye exhibits no discharge. Left eye exhibits no discharge.   Neck: Neck supple. No neck rigidity present.   Cardiovascular: Normal rate, regular rhythm and normal heart sounds.   Pulmonary/Chest: Effort normal and breath sounds normal. No respiratory distress.   Abdominal: Normal appearance.   Musculoskeletal: Normal range of motion.         General: Normal range of motion.   Lymphadenopathy:     He has no cervical adenopathy.   Neurological: He is alert and oriented to person, place, and time.   Skin: Skin is warm, dry and not diaphoretic. Capillary refill takes 2 to 3 seconds.   Psychiatric: His behavior is normal. Mood normal.   Nursing note and vitals reviewed.        Assessment:       1. Sinus congestion "    2. COVID-19 virus detected    3. History of cancer          Plan:         Sinus congestion  -     SARS Coronavirus 2 Antigen, POCT Manual Read    COVID-19 virus detected  -     azelastine (ASTELIN) 137 mcg (0.1 %) nasal spray; 1 spray (137 mcg total) by Nasal route 2 (two) times daily.  Dispense: 30 mL; Refill: 0  -     fluticasone propionate (FLONASE) 50 mcg/actuation nasal spray; 1 spray (50 mcg total) by Each Nostril route once daily.  Dispense: 15.8 mL; Refill: 0  -     cetirizine (ZYRTEC) 10 MG tablet; Take 1 tablet (10 mg total) by mouth once daily.  Dispense: 30 tablet; Refill: 0  -     benzonatate (TESSALON) 100 MG capsule; Take 1 capsule (100 mg total) by mouth 3 (three) times daily as needed for Cough.  Dispense: 30 capsule; Refill: 0  -     promethazine-dextromethorphan (PROMETHAZINE-DM) 6.25-15 mg/5 mL Syrp; Take 5 mLs by mouth every 4 (four) hours as needed (cough).  Dispense: 118 mL; Refill: 0    History of cancer

## 2022-01-11 NOTE — PATIENT INSTRUCTIONS
Symptomatic treatment to include:    Rest, increase fluid intake to include electrolyte replacement  Ibuprofen/Tylenol as directed for fever, sore throat, body aches  Zrytec and flonase for sinus symptoms  Astelin nose spray for nasal stuffiness  Phenergan cough syrup at night for cough  Tessalon perles cough pills as needed day or night  Mucinex D over the counter as directed for sinus congestion.  Coricidin HBP if you have high blood pressure.  Warm, salt water gargles, over the counter throat lozenges or sprays as desires.   Imodium over the counter as directed for diarrhea.  ER for difficulty breathing not relieved by rest, excessive lethargy and/or change in mental status    Follow CDC isolation guidelines as provided    Patient Instructions   POSITIVE COVID TEST      You have tested positive for COVID-19 today.  Please note that patients who test positive for COVID-19 are required by the CDC to undergo isolation for 5 days, then wearing a mask around others for an additional 5 days, after their symptoms first began following the new updated guidelines of 12/27/2021. This isolation starts from the day you first developed symptoms, not the day of your positive test. For example, if your symptoms began on a Monday but tested positive on the following Wednesday, your 5-day isolation begins from that Monday, not the Wednesday you tested positive.  However, if you are asymptomatic (a person who does not have any symptoms) and COVID-19 positive, your 5-day isolation begins on the day you tested positive, regardless of exposure date.  Also, per the CDC guidelines, once your 5 days have passed, symptoms have resolved or are improving, and you have not had fever greater than 100.4F in the last 24 hours without taking any fever reducers such as Tylenol (Acetaminophen) or Motrin (Ibuprofen), you may return to your normal activities including social distancing, wearing masks, and frequent handwashing - YOU DO NOT NEED  ANOTHER TEST IN ORDER TO END YOUR QUARANTINE.      Patient Education       COVID-19 Discharge Instructions   About this topic   Coronavirus disease 2019 is also known as COVID-19. It is a viral illness that infects the lungs. It is caused by a virus called SARS-associated coronavirus (SARS-CoV-2).  The signs of COVID-19 most often start a few days after you have been infected. In some people, it takes longer to show signs. Others never show signs of the infection. You may have a cough, fever, shaking chills and it may be hard to breathe. You may be very tired, have muscle aches, a headache or sore throat. Some people have an upset stomach or loose stools. Others lose their sense of smell or taste. You may not have these signs all the time and they may come and go while you are sick.  The virus spreads easily through droplets when you talk, sneeze, or cough. You can pass the virus to others when you are talking close together, singing, hugging, sharing food, or shaking hands. Doctors believe the germs also survive on surfaces like tables, door handles, and telephones. However, this is not a common way that COVID-19 spreads. Doctors believe you can also spread the infection even if you dont have any symptoms, but they do not know how that happens. This is why getting vaccinated is one of the best ways to keep you healthy and slow the spread of the virus.  Some people have a mild case of COVID-19 and are able to stay at home and away from others until they feel better. Others may need to be in the hospital if they are very sick. Some people with COVID-19 can have some symptoms for weeks or months. People with COVID-19 must isolate themselves. You can start to be around others when your doctor says it is safe to do so.       What care is needed at home?   · Ask your doctor what you need to do when you go home. Make sure you ask questions if you do not understand what the doctor says.  · Drink lots of water, juice, or  broth to replace fluids lost from a fever.  · You may use cool mist humidifiers to help ease congestion and coughing.  · Use 2 to 3 pillows to prop yourself up when you lie down to make it easier to breathe and sleep.  · Do not smoke and do not drink beer, wine, and mixed drinks (alcohol).  · To lower the chance of passing the infection to others, get a COVID-19 vaccine after your infection has resolved.  · If you have not been fully vaccinated:  ? Wear a mask over your mouth and nose if you are around others who are not sick. Cloth masks work best if they have more than one layer of fabric.  ? Wash your hands often.  ? Stay home in a separate room, if possible, away from others. Only go out to get medical care.  ? Use a separate bathroom if possible.  ? Do not make food for others.  What follow-up care is needed?   · Your doctor may ask you to make visits to the office to check on your progress. Be sure to keep these visits. Make sure you wear a mask at these visits.  · If you can, tell the staff you have COVID-19 ahead of time so they can take extra care to stop the disease from spreading.  · It may take a few weeks before your health returns to normal.  What drugs may be needed?   The doctor may order drugs to:  · Help with breathing  · Help with fever  · Help with swelling in your airways and lungs  · Control coughing  · Ease a sore throat  · Help a runny or stuffy nose  Will physical activity be limited?   You may have to limit your physical activity. Talk to your doctor about the right amount of activity for you. If you have been very sick with COVID-19, it can take some time to get your strength back.  Will there be any other care needed?   Doctors do not know how long you can pass the virus on to others after you are sick. This is why it is important to stay in a separate room, if possible, when you are sick. For now, doctors are giving general guidelines for you to follow after you have been sick. Before  you go around other people, you should:  · Be fever free for 24 hours without taking any drugs to lower the fever  · Have no symptoms of cough or shortness of breath  · Wait at least 10 days after first having symptoms or your first positive test, and you need to be symptom free as above. Some experts suggest waiting 20 days if you have had a more severe infection.  Talk with your doctor about getting a COVID-19 vaccine.  What problems could happen?   · Fluid loss. This is dehydration.  · Short-term or long-term lung damage  · Heart problems  · Death  When do I need to call the doctor?   · You are having so much trouble breathing that you can only say one or two words at a time.  · You need to sit upright at all times to be able to breathe and/or cannot lie down.  · You are very confused or cannot stay awake.  · Your lips or skin start to turn blue or grey.  · You think you might be having a medical emergency. Some examples of medical emergencies are:  ? Severe chest pain.  ? Not able to speak or move normally.  · You have trouble breathing when talking or sitting still.  · You have new shortness of breath.  · You become weak or dizzy.  · You have very dark urine or do not pass urine for more than 8 hours.  · You have new or worsening COVID-19 symptoms like:  ? Fever  ? Cough  ? Feeling very tired  ? Shaking chills  ? Headache  ? Trouble swallowing  ? Throwing up  ? Loose stools  ? Reddish purple spots on your fingers or toes  Teach Back: Helping You Understand   The Teach Back Method helps you understand the information we are giving you. After you talk with the staff, tell them in your own words what you learned. This helps to make sure the staff has described each thing clearly. It also helps to explain things that may have been confusing. Before going home, make sure you can do these:  · I can tell you about my condition.  · I can tell you what may help ease my breathing.  · I can tell you what I can do to help  avoid passing the infection to others.  · I can tell you what I will do if I have trouble breathing; feel sleepy or confused; or my fingertips, fingernails, skin, or lips are blue.  Where can I learn more?   Centers for Disease Control and Prevention  https://www.cdc.gov/coronavirus/2019-ncov/about/index.html   Centers for Disease Control and Prevention  https://www.cdc.gov/coronavirus/2019-ncov/hcp/disposition-in-home-patients.html   World Health Organization  https://www.who.int/news-room/q-a-detail/s-c-txxxivpdcmzjn   Last Reviewed Date   2021-10-05  Consumer Information Use and Disclaimer   This information is not specific medical advice and does not replace information you receive from your health care provider. This is only a brief summary of general information. It does NOT include all information about conditions, illnesses, injuries, tests, procedures, treatments, therapies, discharge instructions or life-style choices that may apply to you. You must talk with your health care provider for complete information about your health and treatment options. This information should not be used to decide whether or not to accept your health care providers advice, instructions or recommendations. Only your health care provider has the knowledge and training to provide advice that is right for you.  Copyright   Copyright © 2021 [employer] and its affiliates and/or licensors. All rights reserved.

## 2022-02-18 DIAGNOSIS — U07.1 COVID-19 VIRUS DETECTED: ICD-10-CM

## 2022-02-18 DIAGNOSIS — E79.0 HYPERURICEMIA: ICD-10-CM

## 2022-02-18 NOTE — TELEPHONE ENCOUNTER
Care Due:                  Date            Visit Type   Department     Provider  --------------------------------------------------------------------------------                                ESTABLISHED                              PATIENT      [facility]  Last Visit: 08-      EXTENDED     MEDICINE       Brendan Frank                              EP -                              PRIMARY      [facility]  Next Visit: 09-      CARE ([facility])   PRACTICE       Brendan Frank                                                            Last  Test          Frequency    Reason                     Performed    Due Date  --------------------------------------------------------------------------------    Office Visit  12 months..  allopurinoL, ezetimibe,    08- 08-                             omega-3..................    Powered by [employer]. Reference number: [id number].   2/18/2022 12:10:23 AM CST

## 2022-02-20 RX ORDER — AZELASTINE 1 MG/ML
SPRAY, METERED NASAL
Qty: 90 ML | Refills: 3 | Status: SHIPPED | OUTPATIENT
Start: 2022-02-20 | End: 2022-08-02

## 2022-02-20 RX ORDER — ALLOPURINOL 100 MG/1
TABLET ORAL
Qty: 180 TABLET | Refills: 3 | Status: SHIPPED | OUTPATIENT
Start: 2022-02-20 | End: 2023-02-20

## 2022-03-29 DIAGNOSIS — E78.5 HYPERLIPIDEMIA: ICD-10-CM

## 2022-03-29 RX ORDER — EZETIMIBE 10 MG/1
TABLET ORAL
Qty: 90 TABLET | Refills: 3 | Status: SHIPPED | OUTPATIENT
Start: 2022-03-29 | End: 2022-08-17

## 2022-03-29 NOTE — TELEPHONE ENCOUNTER
This Rx Request does not qualify for assessment with the OR   Please review protocol details and the Care Due Message for extra clinical information    Reasons Rx Request may be deferred:  Patient has been seen in the ED/Hospital since the last PCP visit  Pt due for OV with PCP    Note composed:10:35 AM 03/29/2022

## 2022-03-29 NOTE — TELEPHONE ENCOUNTER
----- Message from Ban Cohen MA sent at 3/29/2022  1:24 PM CDT -----  Contact: CHRISS GONZALEZ [[id number]]  Type: Needs Medical Advice    Who Called: CHRISS GONZALEZ [[id number]]  Best Call Back Number: [phone]  Inquiry/Question: Would you kindly call CHRISS GONZALEZ [[id number]] regarding medication refill  Thank you~

## 2022-03-29 NOTE — TELEPHONE ENCOUNTER
No new care gaps identified.  Powered by [employer]. Reference number: [id number].   3/29/2022 8:52:15 AM CDT

## 2022-03-29 NOTE — TELEPHONE ENCOUNTER
Called and spoke to patient. Patient states that he needs a refill for Flomax sent into [facility] on [address]. Informed patient that provider is out of the office this week but fill forward refill request. Patient states that he only has 3 capsules left

## 2022-03-31 ENCOUNTER — OFFICE VISIT (OUTPATIENT)
Dept: URGENT CARE | Facility: CLINIC | Age: 72
End: 2022-03-31
Payer: MEDICARE

## 2022-03-31 VITALS
DIASTOLIC BLOOD PRESSURE: 84 MMHG | SYSTOLIC BLOOD PRESSURE: 140 MMHG | HEART RATE: 66 BPM | WEIGHT: 174 LBS | RESPIRATION RATE: 12 BRPM | OXYGEN SATURATION: 97 % | TEMPERATURE: 98 F | HEIGHT: 66 IN | BODY MASS INDEX: 27.97 KG/M2

## 2022-03-31 DIAGNOSIS — Z87.438 HISTORY OF BPH: ICD-10-CM

## 2022-03-31 DIAGNOSIS — J01.90 ACUTE NON-RECURRENT SINUSITIS, UNSPECIFIED LOCATION: ICD-10-CM

## 2022-03-31 DIAGNOSIS — R53.83 FATIGUE, UNSPECIFIED TYPE: Primary | ICD-10-CM

## 2022-03-31 LAB
CTP QC/QA: YES
CTP QC/QA: YES
FLUAV AG NPH QL: NEGATIVE
FLUBV AG NPH QL: NEGATIVE
SARS-COV-2 AG RESP QL IA.RAPID: NEGATIVE

## 2022-03-31 PROCEDURE — 87804 POCT INFLUENZA A/B: ICD-10-PCS | Mod: 59,QW,,

## 2022-03-31 PROCEDURE — 87804 INFLUENZA ASSAY W/OPTIC: CPT | Mod: QW,,,

## 2022-03-31 PROCEDURE — 96372 PR INJECTION,THERAP/PROPH/DIAG2ST, IM OR SUBCUT: ICD-10-PCS | Mod: S$GLB,,,

## 2022-03-31 PROCEDURE — 87811 SARS-COV-2 COVID19 W/OPTIC: CPT | Mod: CR,QW,S$GLB,

## 2022-03-31 PROCEDURE — 99214 PR OFFICE/OUTPT VISIT, EST, LEVL IV, 30-39 MIN: ICD-10-PCS | Mod: 25,CR,S$GLB,

## 2022-03-31 PROCEDURE — 96372 THER/PROPH/DIAG INJ SC/IM: CPT | Mod: S$GLB,,,

## 2022-03-31 PROCEDURE — 99214 OFFICE O/P EST MOD 30 MIN: CPT | Mod: 25,CR,S$GLB,

## 2022-03-31 PROCEDURE — 87811 SARS CORONAVIRUS 2 ANTIGEN POCT, MANUAL READ: ICD-10-PCS | Mod: CR,QW,S$GLB,

## 2022-03-31 RX ORDER — TAMSULOSIN HYDROCHLORIDE 0.4 MG/1
0.4 CAPSULE ORAL DAILY
Qty: 30 CAPSULE | Refills: 0 | Status: SHIPPED | OUTPATIENT
Start: 2022-03-31 | End: 2022-04-30

## 2022-03-31 RX ORDER — AMOXICILLIN AND CLAVULANATE POTASSIUM 875; 125 MG/1; MG/1
1 TABLET, FILM COATED ORAL 2 TIMES DAILY
Qty: 20 TABLET | Refills: 0 | Status: SHIPPED | OUTPATIENT
Start: 2022-03-31 | End: 2022-04-10

## 2022-03-31 RX ORDER — DEXAMETHASONE SODIUM PHOSPHATE 4 MG/ML
8 INJECTION, SOLUTION INTRA-ARTICULAR; INTRALESIONAL; INTRAMUSCULAR; INTRAVENOUS; SOFT TISSUE
Status: COMPLETED | OUTPATIENT
Start: 2022-03-31 | End: 2022-03-31

## 2022-03-31 RX ADMIN — DEXAMETHASONE SODIUM PHOSPHATE 8 MG: 4 INJECTION, SOLUTION INTRA-ARTICULAR; INTRALESIONAL; INTRAMUSCULAR; INTRAVENOUS; SOFT TISSUE at 05:03

## 2022-03-31 NOTE — PROGRESS NOTES
"Subjective:       Patient ID: Shree Heredia is a 72 y.o. male.    Vitals:  height is 5' 6" (1.676 m) and weight is 78.9 kg (174 lb). His oral temperature is 97.6 °F (36.4 °C). His blood pressure is 140/84 (abnormal) and his pulse is 66. His respiration is 12 and oxygen saturation is 97%.     Chief Complaint: Sinus Problem and Medication Refill    Patient reports sinus congestion x 5 days. Reports he believes it is a sinus infection.     Sinus Problem  This is a new problem. Episode onset: 03/27/2022. The problem has been rapidly worsening since onset. Associated symptoms include congestion and sinus pressure. Pertinent negatives include no chills, coughing, ear pain or sore throat. (Fatigue) Treatments tried: Mucinex, Zyrtec. The treatment provided no relief.       Constitution: Negative for activity change, appetite change, chills and fever.   HENT: Positive for congestion, postnasal drip, sinus pain and sinus pressure. Negative for ear pain, ear discharge, facial swelling, sore throat, trouble swallowing and voice change.    Neck: Negative for neck stiffness and painful lymph nodes.   Cardiovascular: Negative for chest pain and sob on exertion.   Eyes: Negative for eye pain and blurred vision.   Respiratory: Negative for chest tightness and cough.    Gastrointestinal: Negative for abdominal pain, nausea, vomiting, constipation and diarrhea.   Neurological: Negative for dizziness and history of vertigo.   Hematologic/Lymphatic: Negative for swollen lymph nodes.       Objective:      Physical Exam   Constitutional: He is oriented to person, place, and time. He appears well-developed. He is cooperative.  Non-toxic appearance. He does not appear ill. No distress.   HENT:   Head: Normocephalic and atraumatic.   Ears:   Right Ear: Hearing, tympanic membrane, external ear and ear canal normal.   Left Ear: Hearing, tympanic membrane, external ear and ear canal normal.   Nose: No mucosal edema, rhinorrhea or nasal " deformity. No epistaxis. Right sinus exhibits frontal sinus tenderness. Right sinus exhibits no maxillary sinus tenderness. Left sinus exhibits frontal sinus tenderness. Left sinus exhibits no maxillary sinus tenderness.   Mouth/Throat: Uvula is midline, oropharynx is clear and moist and mucous membranes are normal. No trismus in the jaw. Normal dentition. No uvula swelling. No oropharyngeal exudate, posterior oropharyngeal edema or posterior oropharyngeal erythema.   Eyes: Conjunctivae and lids are normal. No scleral icterus.      extraocular movement intact   Neck: Trachea normal and phonation normal. Neck supple. No edema present. No erythema present. No neck rigidity present.   Cardiovascular: Normal rate, regular rhythm, normal heart sounds and normal pulses.   Pulmonary/Chest: Effort normal and breath sounds normal. No respiratory distress. He has no decreased breath sounds. He has no rhonchi.   Abdominal: Normal appearance.   Musculoskeletal: Normal range of motion.         General: No deformity. Normal range of motion.   Neurological: He is alert and oriented to person, place, and time. He exhibits normal muscle tone. Coordination normal.   Skin: Skin is warm, dry, intact, not diaphoretic and not pale.   Psychiatric: His speech is normal and behavior is normal. Judgment and thought content normal.   Nursing note and vitals reviewed.        Assessment:       1. Fatigue, unspecified type    2. Acute non-recurrent sinusitis, unspecified location    3. History of BPH          Plan:         Fatigue, unspecified type  -     POCT Influenza A/B  -     SARS Coronavirus 2 Antigen, POCT Manual Read    Acute non-recurrent sinusitis, unspecified location  -     amoxicillin-clavulanate 875-125mg (AUGMENTIN) 875-125 mg per tablet; Take 1 tablet by mouth 2 (two) times daily. for 10 days  Dispense: 20 tablet; Refill: 0    History of BPH  -     tamsulosin (FLOMAX) 0.4 mg Cap; Take 1 capsule (0.4 mg total) by mouth once daily.   Dispense: 30 capsule; Refill: 0    Other orders  -     dexamethasone injection 8 mg         Patient requesting steroid shot. Denies having steroids in past year. Reports no side effects with them in the past. He is also requesting refill on his flomax. It appears he has refills but pending approval. Will refill one month supply until his refills get approved.

## 2022-03-31 NOTE — PATIENT INSTRUCTIONS
For the next 2-3 days do a trail of Claritin  or zrytec .   Rotate tylenol and ibuprofen as needed if no contraindications for fever and pain.  Use Flonase and azelastine for nasal congestion.   Follow up with PCP in 2-5 days if symptoms do not resolve.   Take antibiotics with food. Get over counter probiotic.

## 2022-04-04 RX ORDER — TAMSULOSIN HYDROCHLORIDE 0.4 MG/1
1 CAPSULE ORAL DAILY
Qty: 90 CAPSULE | Refills: 3 | Status: SHIPPED | OUTPATIENT
Start: 2022-04-04 | End: 2022-08-02

## 2022-04-22 DIAGNOSIS — E78.5 HYPERLIPIDEMIA: ICD-10-CM

## 2022-04-22 NOTE — TELEPHONE ENCOUNTER
Care Due:                  Date            Visit Type   Department     Provider  --------------------------------------------------------------------------------                                ESTABLISHED                              PATIENT      [facility]  Last Visit: 08-      EXTENDED     MEDICINE       Brendan Frank                              EP -                              PRIMARY      [facility]  Next Visit: 09-      CARE ([facility])   PRACTICE       Brendan Frank                                                            Last  Test          Frequency    Reason                     Performed    Due Date  --------------------------------------------------------------------------------    Office Visit  12 months..  allopurinoL, ezetimibe,    08- 08-                             omega-3..................    Powered by [employer]. Reference number: [id number].   4/22/2022 11:29:46 AM MARICELT

## 2022-04-23 RX ORDER — OMEGA-3-ACID ETHYL ESTERS 1 G/1
CAPSULE, LIQUID FILLED ORAL
Qty: 360 CAPSULE | Refills: 3 | Status: SHIPPED | OUTPATIENT
Start: 2022-04-23 | End: 2023-04-20

## 2022-07-12 ENCOUNTER — PATIENT MESSAGE (OUTPATIENT)
Dept: FAMILY MEDICINE | Facility: CLINIC | Age: 72
End: 2022-07-12
Payer: MEDICARE

## 2022-07-12 ENCOUNTER — TELEPHONE (OUTPATIENT)
Dept: FAMILY MEDICINE | Facility: CLINIC | Age: 72
End: 2022-07-12
Payer: MEDICARE

## 2022-07-21 ENCOUNTER — TELEPHONE (OUTPATIENT)
Dept: FAMILY MEDICINE | Facility: CLINIC | Age: 72
End: 2022-07-21
Payer: MEDICARE

## 2022-07-21 NOTE — TELEPHONE ENCOUNTER
----- Message from Cleve Walker sent at 7/21/2022 10:38 AM CDT -----  Pt send a message in live"[employer]t. Pt needs to reschedule appt. Next appt is showing 3-2023. Pt would like the office to give him a callback.            Pt can be reached at [phone]        TY

## 2022-07-21 NOTE — TELEPHONE ENCOUNTER
Patient has annual appointment scheduled for the date of 9-15-22 with Dr. Frank. States [place] will be out of the [place] on this date. Requesting to reschedule. Next available appointment with Dr. Frank noted on 3-2-23. Offered earlier appointment with ROBIN; patient declined. States he has not seen Dr. Frank in years, and prefer to see Dr. Frank. Appointment rescheduled to 3-2-23, and placed on wait list for possible earlier access. Patient agreed to appointment date, time, and location.

## 2022-08-01 ENCOUNTER — TELEPHONE (OUTPATIENT)
Dept: ADMINISTRATIVE | Facility: CLINIC | Age: 72
End: 2022-08-01
Payer: MEDICARE

## 2022-08-02 ENCOUNTER — OFFICE VISIT (OUTPATIENT)
Dept: FAMILY MEDICINE | Facility: CLINIC | Age: 72
End: 2022-08-02
Payer: MEDICARE

## 2022-08-02 VITALS
HEIGHT: 66 IN | DIASTOLIC BLOOD PRESSURE: 66 MMHG | WEIGHT: 172.5 LBS | HEART RATE: 63 BPM | SYSTOLIC BLOOD PRESSURE: 112 MMHG | OXYGEN SATURATION: 97 % | TEMPERATURE: 98 F | BODY MASS INDEX: 27.72 KG/M2

## 2022-08-02 DIAGNOSIS — E78.2 MIXED HYPERLIPIDEMIA: ICD-10-CM

## 2022-08-02 DIAGNOSIS — I70.0 ABDOMINAL AORTIC ATHEROSCLEROSIS: ICD-10-CM

## 2022-08-02 DIAGNOSIS — Z78.9 STATIN INTOLERANCE: ICD-10-CM

## 2022-08-02 DIAGNOSIS — Z23 NEED FOR VACCINATION FOR STREP PNEUMONIAE: ICD-10-CM

## 2022-08-02 DIAGNOSIS — Z00.00 ENCOUNTER FOR PREVENTIVE HEALTH EXAMINATION: Primary | ICD-10-CM

## 2022-08-02 DIAGNOSIS — Z85.46 PERSONAL HISTORY OF PROSTATE CANCER: ICD-10-CM

## 2022-08-02 PROBLEM — L25.5 DERMATITIS DUE TO PLANTS, INCLUDING POISON IVY, SUMAC, AND OAK: Status: RESOLVED | Noted: 2021-09-09 | Resolved: 2022-08-02

## 2022-08-02 PROCEDURE — G0439 PR MEDICARE ANNUAL WELLNESS SUBSEQUENT VISIT: ICD-10-PCS | Mod: ,,, | Performed by: PHYSICIAN ASSISTANT

## 2022-08-02 PROCEDURE — 99214 OFFICE O/P EST MOD 30 MIN: CPT | Mod: PBBFAC,PN,25 | Performed by: PHYSICIAN ASSISTANT

## 2022-08-02 PROCEDURE — G0439 PPPS, SUBSEQ VISIT: HCPCS | Mod: ,,, | Performed by: PHYSICIAN ASSISTANT

## 2022-08-02 PROCEDURE — 90677 PCV20 VACCINE IM: CPT | Mod: PBBFAC,PN

## 2022-08-02 PROCEDURE — 99999 PR PBB SHADOW E&M-EST. PATIENT-LVL IV: ICD-10-PCS | Mod: PBBFAC,,, | Performed by: PHYSICIAN ASSISTANT

## 2022-08-02 PROCEDURE — 99999 PR PBB SHADOW E&M-EST. PATIENT-LVL IV: CPT | Mod: PBBFAC,,, | Performed by: PHYSICIAN ASSISTANT

## 2022-08-02 RX ORDER — TAMSULOSIN HYDROCHLORIDE 0.4 MG/1
1 CAPSULE ORAL DAILY
COMMUNITY
Start: 2022-07-22 | End: 2023-04-20

## 2022-08-02 NOTE — PATIENT INSTRUCTIONS
Counseling and Referral of Other Preventative  (Italic type indicates deductible and co-insurance are waived)    Patient Name: Shree Heredia  Today's Date: 8/2/2022    Health Maintenance       Date Due Completion Date    High Dose Statin Never done ---    Pneumococcal Vaccines (Age 65+) (2 - PCV) 02/01/2019 2/1/2018    Shingles Vaccine (2 of 2) 07/23/2021 5/28/2021    COVID-19 Vaccine (4 - Booster for Pfizer series) 01/30/2022 9/30/2021    Influenza Vaccine (1) 09/01/2022 9/30/2021    Lipid Panel 09/10/2026 9/10/2021    Colorectal Cancer Screening 03/13/2028 3/13/2018    Override on 3/11/2013: Done (Dr Pinto Nadege   report sent to scanning   kb)    Override on 11/14/2007: Done    TETANUS VACCINE 09/09/2031 9/9/2021        No orders of the defined types were placed in this encounter.    The following information is provided to all patients.  This information is to help you find resources for any of the problems found today that may be affecting your health:                Living healthy guide: www.[facility].[place].gov      Understanding Diabetes: www.diabetes.org      Eating healthy: www.cdc.gov/healthyweight      CDC home safety checklist: www.cdc.gov/steadi/patient.html      Agency on Aging: www.goea.[place].[facility]      Alcoholics anonymous (AA): www.aa.org      Physical Activity: www.bella.nih.gov/cx6voin      Tobacco use: www.quitwithusla.org

## 2022-08-02 NOTE — PROGRESS NOTES
"Shree Heredia presented for a  Medicare AWV and comprehensive Health Risk Assessment today. The following components were reviewed and updated:    · Medical history  · Family History  · Social history  · Allergies and Current Medications  · Health Risk Assessment  · Health Maintenance  · Care Team     ** See Completed Assessments for Annual Wellness Visit within the encounter summary.**       The following assessments were completed:  · Living Situation  · CAGE  · Depression Screening  · Timed Get Up and Go  · Whisper Test  · Cognitive Function Screening  · Nutrition Screening  · ADL Screening  · PAQ Screening    Vitals:    08/02/22 1406   BP: 112/66   Pulse: 63   Temp: 98.4 °F (36.9 °C)   SpO2: 97%   Weight: 78.3 kg (172 lb 8.2 oz)   Height: 5' 6" (1.676 m)     Body mass index is 27.84 kg/m².  Physical Exam  Constitutional:       General: He is not in acute distress.     Appearance: He is well-developed. He is not diaphoretic.   HENT:      Head: Normocephalic and atraumatic.   Neck:      Vascular: No JVD.   Pulmonary:      Effort: Pulmonary effort is normal.   Musculoskeletal:      Cervical back: Normal range of motion and neck supple.   Neurological:      Mental Status: He is alert and oriented to person, place, and time.               Diagnoses and health risks identified today and associated recommendations/orders:    Shree was seen today for medicare awv.    Diagnoses and all orders for this visit:    Encounter for preventive health examination    Abdominal aortic atherosclerosis  Comments:  Stable, encouraged baby aspirin.  Patient discussed with his PCP    Mixed hyperlipidemia  Comments:  Controlled, continue current regimen    Personal history of prostate cancer  Comments:  In remission, continue to monitor    Need for vaccination for Strep pneumoniae  -     (In Office Administered) Pneumococcal Conjugate Vaccine (20 Valent) (IM)    Statin intolerance        Provided Shree with a 5-10 year written screening " schedule and personal prevention plan. Recommendations were developed using the USPSTF age appropriate recommendations. Education, counseling, and referrals were provided as needed. After Visit Summary printed and given to patient which includes a list of additional screenings\tests needed.    No follow-ups on file.    KAYCEE Vincent  I offered to discuss advanced care planning, including how to pick a person who would make decisions for you if you were unable to make them for yourself, called a health care power of , and what kind of decisions you might make such as use of life sustaining treatments such as ventilators and tube feeding when faced with a life limiting illness recorded on a living will that they will need to know. (How you want to be cared for as you near the end of your natural life)     X Patient is interested in learning more about how to make advanced directives.  I provided them paperwork and offered to discuss this with them.

## 2022-08-29 ENCOUNTER — LAB VISIT (OUTPATIENT)
Dept: LAB | Facility: HOSPITAL | Age: 72
End: 2022-08-29
Attending: UROLOGY
Payer: MEDICARE

## 2022-08-29 ENCOUNTER — OFFICE VISIT (OUTPATIENT)
Dept: UROLOGY | Facility: CLINIC | Age: 72
End: 2022-08-29
Payer: MEDICARE

## 2022-08-29 VITALS
BODY MASS INDEX: 27.64 KG/M2 | SYSTOLIC BLOOD PRESSURE: 123 MMHG | WEIGHT: 172 LBS | HEART RATE: 67 BPM | DIASTOLIC BLOOD PRESSURE: 80 MMHG | HEIGHT: 66 IN

## 2022-08-29 DIAGNOSIS — N52.9 ERECTILE DYSFUNCTION: ICD-10-CM

## 2022-08-29 DIAGNOSIS — C61 PROSTATE CANCER: ICD-10-CM

## 2022-08-29 DIAGNOSIS — R39.9 LOWER URINARY TRACT SYMPTOMS (LUTS): Primary | ICD-10-CM

## 2022-08-29 DIAGNOSIS — N52.9 ERECTILE DYSFUNCTION, UNSPECIFIED ERECTILE DYSFUNCTION TYPE: ICD-10-CM

## 2022-08-29 LAB
BILIRUB SERPL-MCNC: NORMAL MG/DL
BLOOD URINE, POC: NORMAL
CLARITY, POC UA: CLEAR
COLOR, POC UA: YELLOW
COMPLEXED PSA SERPL-MCNC: 0.15 NG/ML (ref 0–4)
GLUCOSE UR QL STRIP: NORMAL
KETONES UR QL STRIP: NORMAL
LEUKOCYTE ESTERASE URINE, POC: NORMAL
NITRITE, POC UA: NORMAL
PH, POC UA: 5.5
PROTEIN, POC: NORMAL
SPECIFIC GRAVITY, POC UA: 1.03
UROBILINOGEN, POC UA: 0.2

## 2022-08-29 PROCEDURE — 99213 OFFICE O/P EST LOW 20 MIN: CPT | Mod: PBBFAC,PN | Performed by: UROLOGY

## 2022-08-29 PROCEDURE — 84153 ASSAY OF PSA TOTAL: CPT | Performed by: UROLOGY

## 2022-08-29 PROCEDURE — 81002 URINALYSIS NONAUTO W/O SCOPE: CPT | Mod: PBBFAC,PN | Performed by: UROLOGY

## 2022-08-29 PROCEDURE — 99214 OFFICE O/P EST MOD 30 MIN: CPT | Mod: S$PBB,,, | Performed by: UROLOGY

## 2022-08-29 PROCEDURE — 99999 PR PBB SHADOW E&M-EST. PATIENT-LVL III: CPT | Mod: PBBFAC,,, | Performed by: UROLOGY

## 2022-08-29 PROCEDURE — 99214 PR OFFICE/OUTPT VISIT, EST, LEVL IV, 30-39 MIN: ICD-10-PCS | Mod: S$PBB,,, | Performed by: UROLOGY

## 2022-08-29 PROCEDURE — 99999 PR PBB SHADOW E&M-EST. PATIENT-LVL III: ICD-10-PCS | Mod: PBBFAC,,, | Performed by: UROLOGY

## 2022-08-29 PROCEDURE — 36415 COLL VENOUS BLD VENIPUNCTURE: CPT | Performed by: UROLOGY

## 2022-08-29 RX ORDER — TADALAFIL 5 MG/1
5 TABLET ORAL DAILY
Qty: 30 TABLET | Refills: 11 | Status: SHIPPED | OUTPATIENT
Start: 2022-08-29 | End: 2023-07-27

## 2022-08-29 NOTE — PROGRESS NOTES
[facility] Established Patient/H&P:    Shree Heredia is a 72 y.o. male who presents for follow up for prostate cancer and gross hematuria.    Patient with a history of [place] 3+3=6 prostate cancer diagnosed in 5/2018 with Dr. Hicks. He is now s/p brachytherapy in 9/2018 with Dr. Dobbs in [place].      He underwent initial bone scan which was negative. CT abdomen pelvis with a 2 mm right lung nodule. Stable on follow up CT chest.      He reports moderate lower urinary tract symptoms and erectile dysfunction after brachytherapy. He is managed with Flomax 0.4 mg and Cialis 5 mg for his symptoms.      On review of records, he last saw Dr. Hicks in 5/2020 for gross hematuria. CT revealed a left kidney stone. Urine cytology with atypical cells. Cystoscopy never performed. Hematuria has resolved.     Retired [employer]      Interval History    8/29/22: He is now s/p cystoscopy on 12/1/21 which revealed a friable, hypervascular, mildly obstructing prostate. Repeat urine cytology negative.     He remains on Flomax 0.4 mg  and Cialis 5 mg PO daily. He only reports mild incomplete emptying and nocturia x 2. Urine dipstick negative today. No recurrent gross hematuria.     Denies any fever, chills, flank pain, bone pain, unintentional weight loss or  trauma.         PSA  0.25                 9/10/21  0.43                 8/6/20  5.3                   3/2/18  8.4                   2/1/18  1.1                   5/5/08     Testosterone  244                  10/10/13    Urine culture  No growth 5/12/20    IPSS QoL  3 0 8/29/22      Past Medical History:   Diagnosis Date    Allergic rhinitis, seasonal     Gout attack     Hyperlipidemia     Prostate cancer     Prostate cancer 8/1/2019       Past Surgical History:   Procedure Laterality Date    BACK SURGERY  01/29/2016    CYSTOSCOPY N/A 12/1/2021    Procedure: CYSTOSCOPY;  Surgeon: Marek Guidry Jr., MD;  Location: [facility];  Service: Urology;   "Laterality: N/A;    INGUINAL HERNIA REPAIR         Review of patient's allergies indicates:   Allergen Reactions    No known drug allergies        Medications Reviewed: see MAR    FOCUSED PHYSICAL EXAM:    Vitals:    08/29/22 1037   BP: 123/80   Pulse: 67     Body mass index is 27.76 kg/m². Weight: 78 kg (172 lb) Height: 5' 6" (167.6 cm)       General: Alert, cooperative, no distress, appears stated age  Abdomen: Soft, non-tender, no CVA tenderness, non-distended      LABS:    Recent Results (from the past 336 hour(s))   POCT URINE DIPSTICK WITHOUT MICROSCOPE    Collection Time: 08/29/22 10:42 AM   Result Value Ref Range    Color, UA Yellow     pH, UA 5.5     WBC, UA neg     Nitrite, UA neg     Protein, POC neg     Glucose, UA neg     Ketones, UA neg     Urobilinogen, UA 0.2     Bilirubin, POC neg     Blood, UA neg     Clarity, UA Clear     Spec Grav UA 1.030            Assessment/Diagnosis:    1. Lower urinary tract symptoms (LUTS)  POCT URINE DIPSTICK WITHOUT MICROSCOPE    tadalafiL (CIALIS) 5 MG tablet      2. Prostate cancer  PROSTATE SPECIFIC ANTIGEN, DIAGNOSTIC      3. Erectile dysfunction  tadalafiL (CIALIS) 5 MG tablet          Plans:    - Extensive discussion with patient regarding the etiology and management of his lower urinary tract symptoms. Explained that LUTS are multifactorial and can be secondary to an enlarged prostate, PO intake of bladder irritants, overactive bladder, constipation, malignancy, trauma, infection, stones or medications.   - Continue Cialis 5 mg PO daily for his lower urinary tract symptoms and erectile dysfunction - refills ordered.   - PSA next available given his history of prostate cancer s/p brachytherapy.   - RTC in 1 year with PSA.         "

## 2022-10-22 ENCOUNTER — OFFICE VISIT (OUTPATIENT)
Dept: URGENT CARE | Facility: CLINIC | Age: 72
End: 2022-10-22
Payer: MEDICARE

## 2022-10-22 VITALS
RESPIRATION RATE: 20 BRPM | HEIGHT: 66 IN | HEART RATE: 54 BPM | OXYGEN SATURATION: 96 % | DIASTOLIC BLOOD PRESSURE: 78 MMHG | SYSTOLIC BLOOD PRESSURE: 119 MMHG | TEMPERATURE: 97 F | WEIGHT: 176 LBS | BODY MASS INDEX: 28.28 KG/M2

## 2022-10-22 DIAGNOSIS — J01.90 ACUTE BACTERIAL SINUSITIS: ICD-10-CM

## 2022-10-22 DIAGNOSIS — Z20.822 COVID-19 VIRUS NOT DETECTED: ICD-10-CM

## 2022-10-22 DIAGNOSIS — R89.4 INFLUENZA A VIRUS NOT DETECTED: ICD-10-CM

## 2022-10-22 DIAGNOSIS — R09.81 NASAL CONGESTION: Primary | ICD-10-CM

## 2022-10-22 DIAGNOSIS — B96.89 ACUTE BACTERIAL SINUSITIS: ICD-10-CM

## 2022-10-22 PROCEDURE — 87804 INFLUENZA ASSAY W/OPTIC: CPT | Mod: QW,,, | Performed by: NURSE PRACTITIONER

## 2022-10-22 PROCEDURE — 96372 THER/PROPH/DIAG INJ SC/IM: CPT | Mod: S$GLB,,, | Performed by: NURSE PRACTITIONER

## 2022-10-22 PROCEDURE — 87804 POCT INFLUENZA A/B: ICD-10-PCS | Mod: 59,QW,, | Performed by: NURSE PRACTITIONER

## 2022-10-22 PROCEDURE — 99214 PR OFFICE/OUTPT VISIT, EST, LEVL IV, 30-39 MIN: ICD-10-PCS | Mod: 25,S$GLB,, | Performed by: NURSE PRACTITIONER

## 2022-10-22 PROCEDURE — 96372 PR INJECTION,THERAP/PROPH/DIAG2ST, IM OR SUBCUT: ICD-10-PCS | Mod: S$GLB,,, | Performed by: NURSE PRACTITIONER

## 2022-10-22 PROCEDURE — 99214 OFFICE O/P EST MOD 30 MIN: CPT | Mod: 25,S$GLB,, | Performed by: NURSE PRACTITIONER

## 2022-10-22 PROCEDURE — 87811 SARS-COV-2 COVID19 W/OPTIC: CPT | Mod: QW,CR,S$GLB, | Performed by: NURSE PRACTITIONER

## 2022-10-22 PROCEDURE — 87811 SARS CORONAVIRUS 2 ANTIGEN POCT, MANUAL READ: ICD-10-PCS | Mod: QW,CR,S$GLB, | Performed by: NURSE PRACTITIONER

## 2022-10-22 RX ORDER — PREDNISONE 20 MG/1
20 TABLET ORAL 2 TIMES DAILY
Qty: 6 TABLET | Refills: 0 | Status: SHIPPED | OUTPATIENT
Start: 2022-10-23 | End: 2022-10-26

## 2022-10-22 RX ORDER — DOXYCYCLINE 100 MG/1
100 CAPSULE ORAL 2 TIMES DAILY
Qty: 14 CAPSULE | Refills: 0 | Status: SHIPPED | OUTPATIENT
Start: 2022-10-22 | End: 2022-10-29

## 2022-10-22 RX ORDER — DEXAMETHASONE SODIUM PHOSPHATE 4 MG/ML
8 INJECTION, SOLUTION INTRA-ARTICULAR; INTRALESIONAL; INTRAMUSCULAR; INTRAVENOUS; SOFT TISSUE
Status: COMPLETED | OUTPATIENT
Start: 2022-10-22 | End: 2022-10-22

## 2022-10-22 RX ADMIN — DEXAMETHASONE SODIUM PHOSPHATE 8 MG: 4 INJECTION, SOLUTION INTRA-ARTICULAR; INTRALESIONAL; INTRAMUSCULAR; INTRAVENOUS; SOFT TISSUE at 02:10

## 2022-10-22 NOTE — PROGRESS NOTES
"Subjective:       Patient ID: Shree Heredia is a 72 y.o. male.    Vitals:  height is 5' 6" (1.676 m) and weight is 79.8 kg (176 lb). His temperature is 97.4 °F (36.3 °C). His blood pressure is 119/78 and his pulse is 54 (abnormal). His respiration is 20 and oxygen saturation is 96%.     Chief Complaint: Sinus Problem    Pt states" c/o nasal congestion, fatigue, stuffy nose that has been going on for approximately 2 weeks. Took OTC sinus medicine."          Sinus Problem  Associated symptoms include congestion (Thick) and coughing (Worse when lying down). Pertinent negatives include no ear pain, headaches, shortness of breath, sinus pressure or sore throat.     Constitution: Positive for fatigue. Negative for activity change, appetite change and fever.   HENT:  Positive for congestion (Thick). Negative for ear pain, sinus pressure and sore throat.    Respiratory:  Positive for cough (Worse when lying down) and sputum production (Thick, green). Negative for chest tightness, bloody sputum and shortness of breath.    Gastrointestinal:  Negative for abdominal pain, nausea, vomiting and diarrhea.   Neurological:  Negative for headaches.     Objective:      Physical Exam   Constitutional: He is oriented to person, place, and time. He appears well-developed.  Non-toxic appearance. He does not appear ill. No distress.   HENT:   Head: Normocephalic and atraumatic.   Ears:   Right Ear: Tympanic membrane, external ear and ear canal normal.   Left Ear: Tympanic membrane, external ear and ear canal normal.   Nose: Rhinorrhea and congestion present.   Mouth/Throat: Oropharynx is clear and moist. Mucous membranes are moist. No oropharyngeal exudate or posterior oropharyngeal erythema.   Eyes: Conjunctivae and EOM are normal. Right eye exhibits no discharge. Left eye exhibits no discharge.   Neck: Neck supple. No neck rigidity present.   Cardiovascular: Regular rhythm and normal heart sounds. Bradycardia present. "   Pulmonary/Chest: Effort normal and breath sounds normal. No respiratory distress. He has no wheezes. He has no rhonchi. He has no rales.   Abdominal: Normal appearance.   Musculoskeletal:      Cervical back: He exhibits no tenderness.   Lymphadenopathy:     He has no cervical adenopathy.   Neurological: no focal deficit. He is alert and oriented to person, place, and time.   Skin: Skin is warm, dry and not diaphoretic. Capillary refill takes 2 to 3 seconds.   Psychiatric: His behavior is normal. Mood normal.   Nursing note and vitals reviewed.      Assessment:       1. Nasal congestion    2. COVID-19 virus not detected    3. Influenza A virus not detected    4. Acute bacterial sinusitis        Flu B negative    Plan:         Nasal congestion  -     SARS Coronavirus 2 Antigen, POCT Manual Read  -     POCT Influenza A/B    COVID-19 virus not detected    Influenza A virus not detected    Acute bacterial sinusitis  -     dexAMETHasone injection 8 mg  -     predniSONE (DELTASONE) 20 MG tablet; Take 1 tablet (20 mg total) by mouth 2 (two) times daily. for 3 days  Dispense: 6 tablet; Refill: 0  -     doxycycline (MONODOX) 100 MG capsule; Take 1 capsule (100 mg total) by mouth 2 (two) times daily. for 7 days  Dispense: 14 capsule; Refill: 0       Continue Zyrtec and Flonase daily as already prescribed  Continue Singulair daily as already prescribed  Doxycycline twice a day for 7 days.  Take with food and a full glass of water.  Do not lie down for 1 hour after taking  Start prednisone pills today and take twice a day for 3 days    Personal protection against illness for 1-2 weeks due to lowered immune system from steroid therapy.  Please wear a mask and eye protection around others and wash hands often

## 2022-10-22 NOTE — PATIENT INSTRUCTIONS
Start prednisone pills tomorrow and take twice a day for 3 days or you may choose to take twice a day for 2 days then once a day for 2 days.  Take with food to minimize stomach irritation commonly caused by steroids  Personal protection against illness for 1-2 weeks due to lowered immune system from steroid therapy.  Please wear a mask and eye protection around others and wash hands often     Continue Zyrtec and Claritin daily as already prescribed  Doxycycline twice a day for 7 days.  Take with food and a full glass of water, do not lie down for 1 hours after taking each dose

## 2022-10-27 ENCOUNTER — TELEPHONE (OUTPATIENT)
Dept: FAMILY MEDICINE | Facility: CLINIC | Age: 72
End: 2022-10-27
Payer: MEDICARE

## 2022-10-27 NOTE — TELEPHONE ENCOUNTER
----- Message from Cha Spring sent at 10/27/2022  9:21 AM CDT -----  Contact: pt at [phone]  Type: Needs Medical Advice  Who Called:  pt    Best Call Back Number: [phone]    Additional Information: pt is calling the office to see if he can get an appt due to constant numbness in his toes and also no energy.He also states if he need to be referred to a podiatrist that would be fine as well. Please call back and advise.

## 2022-11-08 ENCOUNTER — OFFICE VISIT (OUTPATIENT)
Dept: FAMILY MEDICINE | Facility: CLINIC | Age: 72
End: 2022-11-08
Payer: MEDICARE

## 2022-11-08 VITALS
WEIGHT: 171.75 LBS | HEIGHT: 66 IN | DIASTOLIC BLOOD PRESSURE: 60 MMHG | SYSTOLIC BLOOD PRESSURE: 92 MMHG | OXYGEN SATURATION: 97 % | TEMPERATURE: 98 F | BODY MASS INDEX: 27.6 KG/M2 | HEART RATE: 60 BPM

## 2022-11-08 DIAGNOSIS — M10.9 GOUT, UNSPECIFIED CAUSE, UNSPECIFIED CHRONICITY, UNSPECIFIED SITE: ICD-10-CM

## 2022-11-08 DIAGNOSIS — E78.2 MIXED HYPERLIPIDEMIA: ICD-10-CM

## 2022-11-08 DIAGNOSIS — R20.0 NUMBNESS AND TINGLING OF BOTH FEET: Primary | ICD-10-CM

## 2022-11-08 DIAGNOSIS — R20.2 NUMBNESS AND TINGLING OF BOTH FEET: Primary | ICD-10-CM

## 2022-11-08 DIAGNOSIS — Z23 NEED FOR INFLUENZA VACCINATION: ICD-10-CM

## 2022-11-08 PROCEDURE — 99999 PR PBB SHADOW E&M-EST. PATIENT-LVL IV: CPT | Mod: PBBFAC,,, | Performed by: NURSE PRACTITIONER

## 2022-11-08 PROCEDURE — 99213 OFFICE O/P EST LOW 20 MIN: CPT | Mod: S$PBB,,, | Performed by: NURSE PRACTITIONER

## 2022-11-08 PROCEDURE — G0008 ADMIN INFLUENZA VIRUS VAC: HCPCS | Mod: PBBFAC,PO

## 2022-11-08 PROCEDURE — 99214 OFFICE O/P EST MOD 30 MIN: CPT | Mod: PBBFAC,PO | Performed by: NURSE PRACTITIONER

## 2022-11-08 PROCEDURE — 99213 PR OFFICE/OUTPT VISIT, EST, LEVL III, 20-29 MIN: ICD-10-PCS | Mod: S$PBB,,, | Performed by: NURSE PRACTITIONER

## 2022-11-08 PROCEDURE — 99999 PR PBB SHADOW E&M-EST. PATIENT-LVL IV: ICD-10-PCS | Mod: PBBFAC,,, | Performed by: NURSE PRACTITIONER

## 2022-11-08 RX ORDER — LATANOPROSTENE BUNOD 0.24 MG/ML
1 SOLUTION/ DROPS OPHTHALMIC DAILY
COMMUNITY
Start: 2022-10-12

## 2022-11-08 NOTE — PROGRESS NOTES
Subjective:       Patient ID: Shree Heredia is a 72 y.o. male.    Chief Complaint: Other (Toe numbness in both feet)     HPI   71 y/o male patient with medical problems listed below presents for tinging and numbness in b/l toes for 6-8 weeks. Denies trauma to the affected area. Patient states mostly b/l 1st toes (right>left) were affected, aggravated on standing on feet. Patient states did some house work and the pain is progressively worsening. Denies chest pain, sob, abdominal pain, pain or swelling in lower extremities, skin changes or skin rash, fever, chills, generalized body ache or weakness. Patient states has not had gout flare for a few years.     Labs reviewed from 9/2021    Patient Active Problem List   Diagnosis    Hyperlipidemia    Gout    Allergic rhinitis, seasonal    Migraine aura without headache    Chronic pansinusitis    Hyperuricemia    Personal history of prostate cancer    Abdominal aortic atherosclerosis    Statin intolerance      Review of patient's allergies indicates:   Allergen Reactions    Grass pollen-june grass standard Other (See Comments)     Triggers his sinuses     Past Surgical History:   Procedure Laterality Date    BACK SURGERY  01/29/2016    CYSTOSCOPY N/A 12/1/2021    Procedure: CYSTOSCOPY;  Surgeon: Marek Guidry Jr., MD;  Location: [facility];  Service: Urology;  Laterality: N/A;    INGUINAL HERNIA REPAIR          Current Outpatient Medications:     allopurinoL (ZYLOPRIM) 100 MG tablet, TAKE 2 TABLETS BY MOUTH ONCE DAILY, Disp: 180 tablet, Rfl: 3    aspirin 81 MG Chew, Take 81 mg by mouth once daily., Disp: , Rfl:     cetirizine (ZYRTEC) 10 MG tablet, Take 10 mg by mouth every evening., Disp: , Rfl:     colchicine (COLCRYS) 0.6 mg tablet, TAKE 1 TABLET BY MOUTH EVERY DAY, Disp: 90 tablet, Rfl: 3    ezetimibe (ZETIA) 10 mg tablet, TAKE 1 TABLET BY MOUTH EVERY DAY, Disp: 90 tablet, Rfl: 0    fluticasone (FLONASE) 50 mcg/actuation nasal spray, 2 sprays (100 mcg total) by  "Each Nare route 2 (two) times daily., Disp: 1 Bottle, Rfl: 0    fluticasone propionate (FLONASE) 50 mcg/actuation nasal spray, 1 spray (50 mcg total) by Each Nostril route once daily., Disp: 15.8 mL, Rfl: 0    omega-3 acid ethyl esters (LOVAZA) 1 gram capsule, TAKE 2 CAPSULES BY MOUTH TWICE A DAY, Disp: 360 capsule, Rfl: 3    tadalafiL (CIALIS) 5 MG tablet, Take 1 tablet (5 mg total) by mouth once daily., Disp: 30 tablet, Rfl: 11    tamsulosin (FLOMAX) 0.4 mg Cap, Take 1 capsule by mouth once daily., Disp: , Rfl:     VYZULTA 0.024 % Drop, Place 1 drop into both eyes once daily., Disp: , Rfl:     Lab Results   Component Value Date    WBC 5.72 09/10/2021    HGB 15.8 09/10/2021    HCT 47.0 09/10/2021     09/10/2021    CHOL 202 (H) 09/10/2021    TRIG 127 09/10/2021    HDL 44 09/10/2021    ALT 24 09/10/2021    AST 29 09/10/2021     09/10/2021    K 4.3 09/10/2021     09/10/2021    CREATININE 1.2 09/10/2021    BUN 14 09/10/2021    CO2 25 09/10/2021    PSA 0.25 09/10/2021     Above labs reviewed    Review of Systems   Constitutional:  Negative for chills and fever.   Respiratory:  Negative for cough, chest tightness and shortness of breath.    Cardiovascular:  Negative for chest pain and palpitations.   Gastrointestinal:  Negative for abdominal pain.   Musculoskeletal:         Tingling and numbness in b/l feet   Skin:  Negative for color change and rash.   Neurological:  Negative for dizziness and headaches.       Objective:   BP 92/60 (BP Location: Right arm, Patient Position: Sitting, BP Method: Medium (Manual))   Pulse 60   Temp 98.1 °F (36.7 °C) (Oral)   Ht 5' 6" (1.676 m)   Wt 77.9 kg (171 lb 11.8 oz)   SpO2 97%   BMI 27.72 kg/m²       Physical Exam  Vitals reviewed.   Constitutional:       General: He is not in acute distress.     Appearance: Normal appearance.   Cardiovascular:      Rate and Rhythm: Normal rate and regular rhythm.      Pulses: Normal pulses.      Heart sounds: Normal heart " sounds.   Pulmonary:      Effort: Pulmonary effort is normal.      Breath sounds: Normal breath sounds.   Chest:      Chest wall: No deformity, swelling or edema.   Abdominal:      General: Abdomen is flat. Bowel sounds are normal.      Palpations: Abdomen is soft.   Musculoskeletal:         General: No swelling or tenderness.      Cervical back: Normal range of motion.      Right lower leg: No edema.      Left lower leg: No edema.   Skin:     General: Skin is warm and dry.      Findings: No erythema or rash.   Neurological:      General: No focal deficit present.      Mental Status: He is alert.      Sensory: No sensory deficit.      Motor: No weakness.      Gait: Gait normal.   Psychiatric:         Mood and Affect: Mood normal.         Behavior: Behavior normal.       Assessment:       1. Numbness and tingling of both feet    2. Mixed hyperlipidemia    3. Gout, unspecified cause, unspecified chronicity, unspecified site        Plan:       1. Mixed hyperlipidemia  - Hemoglobin A1C; Future  - Lipid Panel; Future    2. Numbness and tingling of both feet  - CBC Auto Differential; Future  - Comprehensive Metabolic Panel; Future  - TSH; Future  - Vitamin B12; Future  - FOLATE; Future  - X-Ray Foot Complete Bilateral; Future  - Ambulatory referral/consult to Podiatry; Future    3. Gout, unspecified cause, unspecified chronicity, unspecified site  - URIC ACID; Future     4. Need for influenza vaccination  - Flu vaccine today    Patient with be reevaluated in  as scheduled  or sooner enio Rubin NP

## 2022-11-09 ENCOUNTER — HOSPITAL ENCOUNTER (OUTPATIENT)
Dept: RADIOLOGY | Facility: CLINIC | Age: 72
Discharge: HOME | End: 2022-11-09
Attending: NURSE PRACTITIONER
Payer: MEDICARE

## 2022-11-09 ENCOUNTER — OFFICE VISIT (OUTPATIENT)
Dept: PODIATRY | Facility: CLINIC | Age: 72
End: 2022-11-09
Payer: MEDICARE

## 2022-11-09 VITALS — BODY MASS INDEX: 27.48 KG/M2 | OXYGEN SATURATION: 97 % | HEIGHT: 66 IN | WEIGHT: 171 LBS | HEART RATE: 61 BPM

## 2022-11-09 DIAGNOSIS — M20.12 VALGUS DEFORMITY OF BOTH GREAT TOES: ICD-10-CM

## 2022-11-09 DIAGNOSIS — M79.671 FOOT PAIN, RIGHT: ICD-10-CM

## 2022-11-09 DIAGNOSIS — R20.2 NUMBNESS AND TINGLING OF BOTH FEET: ICD-10-CM

## 2022-11-09 DIAGNOSIS — M20.11 VALGUS DEFORMITY OF BOTH GREAT TOES: ICD-10-CM

## 2022-11-09 DIAGNOSIS — M79.672 FOOT PAIN, LEFT: ICD-10-CM

## 2022-11-09 DIAGNOSIS — R20.0 NUMBNESS AND TINGLING OF BOTH FEET: ICD-10-CM

## 2022-11-09 DIAGNOSIS — R20.2 PARESTHESIA OF BOTH LOWER EXTREMITIES: Primary | ICD-10-CM

## 2022-11-09 PROCEDURE — 73630 XR FOOT COMPLETE 3 VIEW BILATERAL: ICD-10-PCS | Mod: 26,50,S$GLB, | Performed by: RADIOLOGY

## 2022-11-09 PROCEDURE — 73630 X-RAY EXAM OF FOOT: CPT | Mod: TC,50,FY,PO

## 2022-11-09 PROCEDURE — 99203 OFFICE O/P NEW LOW 30 MIN: CPT | Mod: S$GLB,,, | Performed by: PODIATRIST

## 2022-11-09 PROCEDURE — 73630 X-RAY EXAM OF FOOT: CPT | Mod: 26,50,S$GLB, | Performed by: RADIOLOGY

## 2022-11-09 PROCEDURE — 99203 PR OFFICE/OUTPT VISIT, NEW, LEVL III, 30-44 MIN: ICD-10-PCS | Mod: S$GLB,,, | Performed by: PODIATRIST

## 2022-11-09 NOTE — PROGRESS NOTES
"  [address] [facility] [address] [place] [address]  Phone: [phone]   [phone]    Patient's PCP:Brendan Frank Jr, MD  Referring Provider: Carlos Carrillo    Subjective:      Chief Complaint:: Numbness    HPI  Shree Heredia is a 72 y.o. male who presents today with a complaint of bilateral numbness and tingling, moreso in the bilateral 1-3 toes, lasting for about 6 weeks now.  Onset of symptoms unknown and reports no trauma.  Current symptoms include constant numbness, tingling sensation that is more pronounced with prolong walking/standing.   Treatment to date have included none, xrays were taken this morning of both feet also.    Vitals:    11/09/22 1133   Pulse: 61   SpO2: 97%   Weight: 77.6 kg (171 lb)   Height: 5' 6" (1.676 m)   PainSc: 0-No pain      Shoe Size: 10    Past Surgical History:   Procedure Laterality Date    BACK SURGERY  01/29/2016    CYSTOSCOPY N/A 12/1/2021    Procedure: CYSTOSCOPY;  Surgeon: Marek Guidry Jr., MD;  Location: [facility];  Service: Urology;  Laterality: N/A;    INGUINAL HERNIA REPAIR       Past Medical History:   Diagnosis Date    Allergic rhinitis, seasonal     Gout attack     Hyperlipidemia     Prostate cancer     Prostate cancer 8/1/2019     Family History   Problem Relation Age of Onset    Hypertension Mother     Diabetes Mother     Hyperlipidemia Mother     Heart disease Mother     Glaucoma Mother     Glaucoma Father     Hyperlipidemia Father     Diabetes Father     Heart disease Sister     Diabetes Sister     Hypertension Sister     Heart disease Maternal Aunt     Heart disease Maternal Uncle         Social History:   Marital Status:   Alcohol History:  reports current alcohol use.  Tobacco History:  reports that he quit smoking about 12 years ago. He has never used smokeless tobacco.  Drug History:  reports no history of drug use.    Review of patient's allergies indicates:   Allergen Reactions    Grass pollen-june grass standard Other (See Comments) "     Triggers his sinuses       Current Outpatient Medications   Medication Sig Dispense Refill    allopurinoL (ZYLOPRIM) 100 MG tablet TAKE 2 TABLETS BY MOUTH ONCE DAILY 180 tablet 3    aspirin 81 MG Chew Take 81 mg by mouth once daily.      cetirizine (ZYRTEC) 10 MG tablet Take 10 mg by mouth every evening.      colchicine (COLCRYS) 0.6 mg tablet TAKE 1 TABLET BY MOUTH EVERY DAY 90 tablet 3    ezetimibe (ZETIA) 10 mg tablet TAKE 1 TABLET BY MOUTH EVERY DAY 90 tablet 0    fluticasone (FLONASE) 50 mcg/actuation nasal spray 2 sprays (100 mcg total) by Each Nare route 2 (two) times daily. 1 Bottle 0    fluticasone propionate (FLONASE) 50 mcg/actuation nasal spray 1 spray (50 mcg total) by Each Nostril route once daily. 15.8 mL 0    omega-3 acid ethyl esters (LOVAZA) 1 gram capsule TAKE 2 CAPSULES BY MOUTH TWICE A  capsule 3    tadalafiL (CIALIS) 5 MG tablet Take 1 tablet (5 mg total) by mouth once daily. 30 tablet 11    tamsulosin (FLOMAX) 0.4 mg Cap Take 1 capsule by mouth once daily.      VYZULTA 0.024 % Drop Place 1 drop into both eyes once daily.       No current facility-administered medications for this visit.       Review of Systems   Constitutional:  Negative for chills, fatigue, fever and unexpected weight change.   HENT:  Negative for hearing loss and trouble swallowing.    Eyes:  Negative for photophobia and visual disturbance.   Respiratory:  Negative for cough, shortness of breath and wheezing.    Cardiovascular:  Negative for chest pain, palpitations and leg swelling.   Gastrointestinal:  Negative for abdominal pain and nausea.   Genitourinary:  Negative for dysuria and frequency.   Musculoskeletal:  Negative for arthralgias, back pain, gait problem, joint swelling and myalgias.   Skin:  Negative for rash and wound.   Neurological:  Negative for tremors, seizures, weakness, numbness and headaches.   Hematological:  Bruises/bleeds easily.       Objective:        Physical Exam:   Foot Exam  Physical  Exam  Ortho/SPM Exam   Constitutional: Patient is oriented to person, place, and time. Patient appears well-developed and well-nourished. No acute distress.      Psychiatric: Patient has a normal mood and affect. Patient's speech is normal and behavior is normal. Judgment is normal. Cognition and memory are normal.     Hallux abductovalgus bilateral    Paresthesias, and burning bilateral feet with no clearly identified trigger or source.    Skin is normal age and health appropriate color, turgor, texture, and temperature bilateral lower extremities without ulceration, hyperpigmentation, discoloration, masses nodules or cords palpated.  No ecchymosis, erythema, edema, or cardinal signs of infection bilateral lower extremities.       Protective sensation intact. Pain sensation intact bilateral feet and legs.    DP and PT pulses 2/4 bilateral, capillary refill 3 seconds all toes/distal feet, all toes/both feet warm to touch.   Negative lower extremity edema bilateral.    Imaging:   Narrative & Impression  EXAMINATION:  XR FOOT COMPLETE 3 VIEW BILATERAL     CLINICAL HISTORY:  Anesthesia of skin     TECHNIQUE:  AP, lateral, and oblique views of both feet were performed.     COMPARISON:  None     FINDINGS:  Right:     Hallux valgus deformity is noted bunion formation at 1st metatarsal head.  Bifurcation of the medial sesamoid of the great toe is noted as can be seen with history of fracture.  Spurring is noted at the 5th metatarsal base.  An os peroneal appears to be present.  A well corticated calcification is noted at the 3rd metatarsal phalangeal articulation suggestive of chip fracture, loose body, or accessory ossification center of 3 mm.  An Achilles calcaneal spur is noted.  A plantar calcaneal spur is noted.  Talonavicular joint space loss noted suggesting degenerative change.     Left:     Hallux valgus deformity is noted with bunion formation at the 1st metatarsal head.  Calcific density is noted at the fibular  tip suggestive of history of chip injury or ligamentous injury that appears well corticated and remote with at least 2 fragments the largest of 8 mm.  A small plantar calcaneal spur and Achilles calcaneal spur are noted.            Assessment:       1. Valgus deformity of both great toes    2. Numbness and tingling of both feet    3. Paresthesia of both lower extremities    4. Foot pain, right    5. Foot pain, left      Plan:   Valgus deformity of both great toes    Numbness and tingling of both feet  -     Ambulatory referral/consult to Podiatry    Paresthesia of both lower extremities    Foot pain, right    Foot pain, left    No follow-ups on file.    Procedures          Counseling:     I provided patient education verbally regarding:   Patient diagnosis, treatment options, as well as alternatives, risks, and benefits.     This note was created using Dragon voice recognition software that occasionally misinterpreted phrases or words.       I discussed neuropathy and  that I believe patient's symptoms are coming from issues he is experiencing in his lower back.  The area of symptoms and dermatomes affected lead me to believe this is a low back issue leading to symptoms of nerve type pain in certain areas of the feet  Patient states he has had back surgery previously and will return to his physician he sees for his lower back as he is not followed up since surgery several years ago.    I discussed Diabetes, lower back issues, as potential causes of neuropathy I also explained that as much as 40% of the time we can not find a cause.       Additional patient instructions:     - Check feet daily for wounds and areas of irritation.     - Keep regularly scheduled appointments     - Apply moisturizing cream to feet and ankles daily but not between toes.     - Keep feet clean and dry, especially between toes.     - Elevate feet as much as possible throughout the day.     - Wear supportive/accommodative shoes at all times  when ambulating.     - Wear over-the-counter compression socks at all times when ambulating.  Do not wear them while resting for prolonged periods of time such as when sleeping.     - Notify clinic immediately of any new or worsening conditions.    Patient should call the office immediately if any signs of infection, such as fever, chills, sweats, increased redness or pain.    Patient was instructed to call the clinic or go to the emergency department if their symptoms do not improve, worsens, or if new symptoms develop.  Patient was advised that if any increased swelling, pain, or numbness arise to go immediately to the ED. Patient knows to call any time if an emergency arises. Shared decision making occurred and patient verbalized understanding in agreement with this plan.

## 2022-11-23 DIAGNOSIS — M10.00 ACUTE IDIOPATHIC GOUT, UNSPECIFIED SITE: ICD-10-CM

## 2022-11-23 RX ORDER — COLCHICINE 0.6 MG/1
TABLET ORAL
Qty: 90 TABLET | Refills: 3 | Status: SHIPPED | OUTPATIENT
Start: 2022-11-23 | End: 2023-11-22

## 2022-12-29 ENCOUNTER — OFFICE VISIT (OUTPATIENT)
Dept: URGENT CARE | Facility: CLINIC | Age: 72
End: 2022-12-29
Attending: NURSE PRACTITIONER
Payer: MEDICARE

## 2022-12-29 VITALS
RESPIRATION RATE: 16 BRPM | HEIGHT: 66 IN | HEART RATE: 74 BPM | BODY MASS INDEX: 28.12 KG/M2 | WEIGHT: 175 LBS | OXYGEN SATURATION: 98 % | SYSTOLIC BLOOD PRESSURE: 116 MMHG | DIASTOLIC BLOOD PRESSURE: 77 MMHG | TEMPERATURE: 97 F

## 2022-12-29 DIAGNOSIS — J01.00 ACUTE NON-RECURRENT MAXILLARY SINUSITIS: Primary | ICD-10-CM

## 2022-12-29 PROCEDURE — 99213 OFFICE O/P EST LOW 20 MIN: CPT | Mod: S$GLB,,, | Performed by: NURSE PRACTITIONER

## 2022-12-29 PROCEDURE — 99213 PR OFFICE/OUTPT VISIT, EST, LEVL III, 20-29 MIN: ICD-10-PCS | Mod: S$GLB,,, | Performed by: NURSE PRACTITIONER

## 2022-12-29 RX ORDER — AMOXICILLIN AND CLAVULANATE POTASSIUM 875; 125 MG/1; MG/1
1 TABLET, FILM COATED ORAL EVERY 12 HOURS
Qty: 14 TABLET | Refills: 0 | Status: SHIPPED | OUTPATIENT
Start: 2022-12-29 | End: 2023-01-05

## 2022-12-29 NOTE — PROGRESS NOTES
"Subjective:       Patient ID: Shree Heredia is a 72 y.o. male.    Vitals:  height is 5' 6" (1.676 m) and weight is 79.4 kg (175 lb). His temperature is 97.3 °F (36.3 °C). His blood pressure is 116/77 and his pulse is 74. His respiration is 16 and oxygen saturation is 98%.     Chief Complaint: Cough    Pt states he has sinus infection, symptoms include stuffy nose, fatigue, 2-3 days ago developed a productive cough, slight headache. X 2 weeks.       HENT:  Positive for postnasal drip, sinus pain and sinus pressure.      Objective:      Physical Exam   Constitutional: He is oriented to person, place, and time.   HENT:   Head: Normocephalic and atraumatic.   Ears:   Right Ear: Tympanic membrane, external ear and ear canal normal.   Left Ear: Tympanic membrane, external ear and ear canal normal.   Nose: Purulent discharge and sinus tenderness present. Right sinus exhibits maxillary sinus tenderness. Left sinus exhibits maxillary sinus tenderness.   Mouth/Throat: Mucous membranes are moist. Posterior oropharyngeal erythema present.   Eyes: Conjunctivae are normal. Extraocular movement intact   Neck: Neck supple.   Cardiovascular: Normal rate, regular rhythm, normal heart sounds and normal pulses.   Pulmonary/Chest: Effort normal and breath sounds normal.   Abdominal: Normal appearance. Soft.   Musculoskeletal: Normal range of motion.         General: Normal range of motion.   Neurological: He is alert and oriented to person, place, and time.   Skin: Skin is warm and dry. Capillary refill takes 2 to 3 seconds.   Psychiatric: His behavior is normal. Mood normal.   Nursing note and vitals reviewed.      Assessment:       1. Acute non-recurrent maxillary sinusitis          Plan:       Patient declined swabs. Bilateral maxillary tenderness upon palpation with purulent drainage. Patient has PMH of recurrent sinus infections and has been evaluated by ENT. He has stopped using flonase and other nasal sprays due to "loss " "of smell".   Acute non-recurrent maxillary sinusitis  -     amoxicillin-clavulanate 875-125mg (AUGMENTIN) 875-125 mg per tablet; Take 1 tablet by mouth every 12 (twelve) hours. for 7 days  Dispense: 14 tablet; Refill: 0         Augmentin 875mg twice daily with food x 7 days  Consider anti-histamine (allegra, zyrtec, claritin)  Follow up if your symptoms persist or worsen            "

## 2023-02-07 ENCOUNTER — OFFICE VISIT (OUTPATIENT)
Dept: ORTHOPEDICS | Facility: CLINIC | Age: 73
End: 2023-02-07
Payer: MEDICARE

## 2023-02-07 VITALS — HEIGHT: 66 IN | BODY MASS INDEX: 28.12 KG/M2 | WEIGHT: 175 LBS

## 2023-02-07 DIAGNOSIS — M17.12 PRIMARY OSTEOARTHRITIS OF LEFT KNEE: ICD-10-CM

## 2023-02-07 DIAGNOSIS — M19.012 PRIMARY OSTEOARTHRITIS OF LEFT SHOULDER: ICD-10-CM

## 2023-02-07 DIAGNOSIS — M19.011 PRIMARY OSTEOARTHRITIS OF RIGHT SHOULDER: Primary | ICD-10-CM

## 2023-02-07 PROCEDURE — 20610 DRAIN/INJ JOINT/BURSA W/O US: CPT | Mod: 51,LT,S$GLB, | Performed by: ORTHOPAEDIC SURGERY

## 2023-02-07 PROCEDURE — 99213 OFFICE O/P EST LOW 20 MIN: CPT | Mod: 25,S$GLB,, | Performed by: ORTHOPAEDIC SURGERY

## 2023-02-07 PROCEDURE — 99213 PR OFFICE/OUTPT VISIT, EST, LEVL III, 20-29 MIN: ICD-10-PCS | Mod: 25,S$GLB,, | Performed by: ORTHOPAEDIC SURGERY

## 2023-02-07 PROCEDURE — 20610 LARGE JOINT ASPIRATION/INJECTION: L KNEE: ICD-10-PCS | Mod: 50,S$GLB,, | Performed by: ORTHOPAEDIC SURGERY

## 2023-02-07 PROCEDURE — 20610 DRAIN/INJ JOINT/BURSA W/O US: CPT | Mod: 50,S$GLB,, | Performed by: ORTHOPAEDIC SURGERY

## 2023-02-07 RX ORDER — TRIAMCINOLONE ACETONIDE 40 MG/ML
40 INJECTION, SUSPENSION INTRA-ARTICULAR; INTRAMUSCULAR
Status: DISCONTINUED | OUTPATIENT
Start: 2023-02-07 | End: 2023-02-07 | Stop reason: HOSPADM

## 2023-02-07 RX ADMIN — TRIAMCINOLONE ACETONIDE 40 MG: 40 INJECTION, SUSPENSION INTRA-ARTICULAR; INTRAMUSCULAR at 11:02

## 2023-02-07 NOTE — PROCEDURES
Large Joint Aspiration/Injection: L knee    Date/Time: 2/7/2023 11:45 AM  Performed by: Germán Carty MD  Authorized by: Germán Carty MD     Consent Done?:  Yes (Verbal)  Indications:  Pain  Site marked: the procedure site was marked    Timeout: prior to procedure the correct patient, procedure, and site was verified    Prep: patient was prepped and draped in usual sterile fashion      Local anesthesia used?: Yes    Local anesthetic:  Lidocaine 1% without epinephrine    Details:  Needle Size:  25 G  Ultrasonic Guidance for needle placement?: No    Approach:  Anterolateral  Location:  Knee  Site:  L knee  Medications:  40 mg triamcinolone acetonide 40 mg/mL  Patient tolerance:  Patient tolerated the procedure well with no immediate complications

## 2023-02-07 NOTE — PROCEDURES
Large Joint Aspiration/Injection: L subacromial bursa    Date/Time: 2/7/2023 11:45 AM  Performed by: Germán Carty MD  Authorized by: Germán Carty MD     Consent Done?:  Yes (Verbal)  Site marked: the procedure site was marked    Timeout: prior to procedure the correct patient, procedure, and site was verified    Prep: patient was prepped and draped in usual sterile fashion      Local anesthesia used?: Yes    Local anesthetic:  Lidocaine 1% without epinephrine  Ultrasonic Guidance for needle placement?: No    Location:  Shoulder  Site:  L subacromial bursa  Medications:  40 mg triamcinolone acetonide 40 mg/mL  Patient tolerance:  Patient tolerated the procedure well with no immediate complications

## 2023-02-07 NOTE — PROCEDURES
Large Joint Aspiration/Injection: R subacromial bursa    Date/Time: 2/7/2023 11:45 AM  Performed by: Germán Carty MD  Authorized by: Germán Carty MD     Consent Done?:  Yes (Verbal)  Indications:  Pain  Site marked: the procedure site was marked    Timeout: prior to procedure the correct patient, procedure, and site was verified    Prep: patient was prepped and draped in usual sterile fashion      Local anesthesia used?: Yes    Local anesthetic:  Lidocaine 1% without epinephrine    Details:  Needle Size:  25 G  Ultrasonic Guidance for needle placement?: No    Location:  Shoulder  Site:  R subacromial bursa  Medications:  40 mg triamcinolone acetonide 40 mg/mL  Patient tolerance:  Patient tolerated the procedure well with no immediate complications

## 2023-02-07 NOTE — PROGRESS NOTES
Formerly [facility] ORTHOPEDICS    Subjective:     Chief Complaint:   Chief Complaint   Patient presents with    Left Shoulder - Pain     Patient is having b/l shoulder pain, this pain gradually gotten worse, the left shoulder is worse than the right, he states that he was told that he has bone spurs, he has painful and limited range of motion.    Right Shoulder - Pain       Past Medical History:   Diagnosis Date    Allergic rhinitis, seasonal     Gout attack     Hyperlipidemia     Prostate cancer     Prostate cancer 8/1/2019       Past Surgical History:   Procedure Laterality Date    BACK SURGERY  01/29/2016    CYSTOSCOPY N/A 12/1/2021    Procedure: CYSTOSCOPY;  Surgeon: Marek Guidry Jr., MD;  Location: Pending sale to [facility];  Service: Urology;  Laterality: N/A;    INGUINAL HERNIA REPAIR         Current Outpatient Medications   Medication Sig    allopurinoL (ZYLOPRIM) 100 MG tablet TAKE 2 TABLETS BY MOUTH ONCE DAILY    aspirin 81 MG Chew Take 81 mg by mouth once daily.    cetirizine (ZYRTEC) 10 MG tablet Take 10 mg by mouth every evening.    colchicine (COLCRYS) 0.6 mg tablet TAKE 1 TABLET BY MOUTH EVERY DAY    ezetimibe (ZETIA) 10 mg tablet TAKE 1 TABLET BY MOUTH EVERY DAY    fluticasone (FLONASE) 50 mcg/actuation nasal spray 2 sprays (100 mcg total) by Each Nare route 2 (two) times daily.    fluticasone propionate (FLONASE) 50 mcg/actuation nasal spray 1 spray (50 mcg total) by Each Nostril route once daily.    omega-3 acid ethyl esters (LOVAZA) 1 gram capsule TAKE 2 CAPSULES BY MOUTH TWICE A DAY    tadalafiL (CIALIS) 5 MG tablet Take 1 tablet (5 mg total) by mouth once daily.    tamsulosin (FLOMAX) 0.4 mg Cap Take 1 capsule by mouth once daily.    VYZULTA 0.024 % Drop Place 1 drop into both eyes once daily.     No current facility-administered medications for this visit.       Review of patient's allergies indicates:   Allergen Reactions    Grass pollen-june grass standard Other (See Comments)     Triggers his sinuses       Family  History   Problem Relation Age of Onset    Hypertension Mother     Diabetes Mother     Hyperlipidemia Mother     Heart disease Mother     Glaucoma Mother     Glaucoma Father     Hyperlipidemia Father     Diabetes Father     Heart disease Sister     Diabetes Sister     Hypertension Sister     Heart disease Maternal Aunt     Heart disease Maternal Uncle        Social History     Socioeconomic History    Marital status:    Occupational History     Employer: [employer]   Tobacco Use    Smoking status: Former     Types: Cigarettes     Quit date: 2010     Years since quittin.1    Smokeless tobacco: Never   Substance and Sexual Activity    Alcohol use: Yes     Comment: soc    Drug use: Never    Sexual activity: Yes     Partners: Female     Social Determinants of Health     Financial Resource Strain: Low Risk     Difficulty of Paying Living Expenses: Not hard at all   Food Insecurity: No Food Insecurity    Worried About Running Out of Food in the Last Year: Never true    Ran Out of Food in the Last Year: Never true   Transportation Needs: No Transportation Needs    Lack of Transportation (Medical): No    Lack of Transportation (Non-Medical): No   Physical Activity: Sufficiently Active    Days of Exercise per Week: 5 days    Minutes of Exercise per Session: 60 min   Stress: No Stress Concern Present    Feeling of Stress : Only a little   Social Connections: Moderately Integrated    Frequency of Communication with Friends and Family: More than three times a week    Frequency of Social Gatherings with Friends and Family: More than three times a week    Attends Confucianism Services: More than 4 times per year    Active Member of Clubs or Organizations: No    Attends Club or Organization Meetings: Never    Marital Status:    Housing Stability: Low Risk     Unable to Pay for Housing in the Last Year: No    Number of Places Lived in the Last Year: 1    Unstable Housing in the Last Year: No       History of present  illness:  72-year-old male, returns to clinic today for multiple complaints.    The primary reason for his visit today is for complaints of bilateral shoulder pain.  He has had shoulder pain before in the past, he was a prior patient of Dr. Malik.  He was told that he had some arthritis and possibly a rotator cuff tear.  However, he is managed well with this over many years.  He states now he gets pain primarily at night it wakes him up from his sleep it does bother him but does in limit his functional activities or daily activities.  He can live with the pain it is the poor sleep at night.     He also has some left knee pain, Dr. Carty saw him few years ago, 2021 injected his left knee and has done well until then.  Chondromalacia patella, primary osteoarthritis.      Review of Systems:    Constitution: Negative for chills, fever, and sweats.  Negative for unexplained weight loss.    HENT:  Negative for headaches and blurry vision.    Cardiovascular:Negative for chest pain or irregular heart beat. Negative for hypertension.    Respiratory:  Negative for cough and shortness of breath.    Gastrointestinal: Negative for abdominal pain, heartburn, melena, nausea, and vomitting.    Genitourinary:  Negative bladder incontinence and dysuria.    Musculoskeletal:  See HPI for details.     Neurological: Negative for numbness.    Psychiatric/Behavioral: Negative for depression.  The patient is not nervous/anxious.      Endocrine: Negative for polyuria    Hematologic/Lymphatic: Negative for bleeding problem.  Does not bruise/bleed easily.    Skin: Negative for poor would healing and rash    Objective:      Physical Examination:    Vital Signs:  There were no vitals filed for this visit.    Body mass index is 28.25 kg/m².    This a well-developed, well nourished patient in no acute distress.  They are alert and oriented and cooperative to examination.        Examination of the bilateral shoulders, the skin is dry and intact,  no erythema or ecchymosis, no signs symptoms of infection.  Patient has painful and limited range of motion bilaterally.  However it is fairly symmetrical.  He has about 170° of forward flexion, external rotation is only about 45° on the left, about 70° on the right.  Internal rotation is limited to the posterior hips bilaterally.  A Apolinar's test, he does have some mild discomfort but no weakness with resisted testing bilaterally.  He is tender over the AC joints bilaterally, he has crepitus bilaterally.    Examination of the left knee, the skin is dry and intact, no erythema ecchymosis signs symptoms of infection.  No effusion is present.  Range of motion 0-120 degrees.  Stable anterior-posterior varus and valgus stress.  Calf soft nontender, straight leg raise negative.    Pertinent New Results:    XRAY Report / Interpretation:   AP and lateral views of the bilateral shoulders taken today in the office demonstrate advanced glenohumeral arthritis worse on the left than the right.  There is no appearance of superior migration of the humeral head within the glenoid.    Assessment/Plan:      Bilateral shoulder pain, osteoarthritis bilateral shoulders, left greater than right.  Patient has good range of motion does not limit his functional capacity daily.  Appearance of intact rotator cuffs on exam and x-ray.  I simply offered him injections in the bilateral shoulders to see if this will improve his symptoms.  We talked extensively about shoulder replacement surgery which ultimately may be necessary at some point but without prior treatment or injections I think that this is the 1st step.  He agreed we injected both shoulders today, posterior approach subacromial space lidocaine and triamcinolone sterile technique and he tolerated this well.    Chondromalacia patella left knee, primary osteoarthritis left knee, we injected the left knee today as well which he did well within the previous 2 years.  We did anterior  "lateral approach sterile technique lidocaine and triamcinolone the patient tolerated well.    Will simply have him follow up with us in 2 months to see how he is doing with the injections.  If he had such a good response with the knee injection with the shoulders hopefully he can follow-up with us p.r.n. or on an as-needed basis.    Jonny Pérez, Physician Assistant, served in the capacity as a "scribe" for this patient encounter.  A "face-to-face" encounter occurred with Dr. Germán Carty on this date.  The treatment plan and medical decision-making is outlined above. Patient was seen and examined with a chaperone.       This note was created using Dragon voice recognition software that occasionally misinterpreted phrases or words.  "

## 2023-02-20 DIAGNOSIS — E79.0 HYPERURICEMIA: ICD-10-CM

## 2023-02-20 RX ORDER — ALLOPURINOL 100 MG/1
TABLET ORAL
Qty: 180 TABLET | Refills: 3 | Status: SHIPPED | OUTPATIENT
Start: 2023-02-20 | End: 2024-02-19

## 2023-02-20 NOTE — TELEPHONE ENCOUNTER
No new care gaps identified.  [facility] Embedded Care Gaps. Reference number: [id number]. 2/20/2023   12:14:42 AM CST

## 2023-02-20 NOTE — TELEPHONE ENCOUNTER
Refill Routing Note   Medication(s) are not appropriate for processing by [facility] for the following reason(s):       Patient not seen by PCP within 15 months    [facility] action(s):  Defer         Appointments  past 12m or future 3m with PCP    Date Provider   Last Visit   8/6/2020 Brendan Frank Jr., MD   Next Visit   3/2/2023 Brendan Frank Jr., MD   ED visits in past 90 days: 0        Note composed:10:15 AM 02/20/2023

## 2023-03-03 ENCOUNTER — TELEPHONE (OUTPATIENT)
Dept: FAMILY MEDICINE | Facility: CLINIC | Age: 73
End: 2023-03-03
Payer: MEDICARE

## 2023-03-03 NOTE — TELEPHONE ENCOUNTER
----- Message from Kelsie Moncada sent at 3/3/2023  1:37 PM CST -----  Contact: [phone]  Type: Needs Medical Advice  Who Called:  Pt     Best Call Back Number: [phone]     Additional Information: Pt requesting a call back from Radha regarding an appt.

## 2023-03-13 ENCOUNTER — OFFICE VISIT (OUTPATIENT)
Dept: FAMILY MEDICINE | Facility: CLINIC | Age: 73
End: 2023-03-13
Payer: MEDICARE

## 2023-03-13 VITALS
HEART RATE: 65 BPM | OXYGEN SATURATION: 96 % | DIASTOLIC BLOOD PRESSURE: 68 MMHG | WEIGHT: 168.44 LBS | BODY MASS INDEX: 27.07 KG/M2 | SYSTOLIC BLOOD PRESSURE: 110 MMHG | HEIGHT: 66 IN | TEMPERATURE: 98 F

## 2023-03-13 DIAGNOSIS — E78.2 MIXED HYPERLIPIDEMIA: Primary | ICD-10-CM

## 2023-03-13 DIAGNOSIS — C61 PROSTATE CANCER: ICD-10-CM

## 2023-03-13 DIAGNOSIS — E79.0 HYPERURICEMIA: ICD-10-CM

## 2023-03-13 DIAGNOSIS — I70.0 ABDOMINAL AORTIC ATHEROSCLEROSIS: ICD-10-CM

## 2023-03-13 PROCEDURE — 99213 OFFICE O/P EST LOW 20 MIN: CPT | Mod: S$PBB,,, | Performed by: FAMILY MEDICINE

## 2023-03-13 PROCEDURE — 99999 PR PBB SHADOW E&M-EST. PATIENT-LVL III: CPT | Mod: PBBFAC,,, | Performed by: FAMILY MEDICINE

## 2023-03-13 PROCEDURE — 99213 PR OFFICE/OUTPT VISIT, EST, LEVL III, 20-29 MIN: ICD-10-PCS | Mod: S$PBB,,, | Performed by: FAMILY MEDICINE

## 2023-03-13 PROCEDURE — 99213 OFFICE O/P EST LOW 20 MIN: CPT | Mod: PBBFAC,PN | Performed by: FAMILY MEDICINE

## 2023-03-13 PROCEDURE — 99999 PR PBB SHADOW E&M-EST. PATIENT-LVL III: ICD-10-PCS | Mod: PBBFAC,,, | Performed by: FAMILY MEDICINE

## 2023-03-15 NOTE — PROGRESS NOTES
Subjective:       Patient ID: Shree Heredia is a 72 y.o. male.    Chief Complaint: Hyperlipidemia, Hyperuricemia, and Prostate Cancer    Patient presents here for annual follow-up of hyperlipidemia, hyperuricemia, and prostate cancer.  He states he is doing well and is feeling well.  He has retired again for the 3rd time and he states this is for good.  We did discuss the need to stay active for physical and mental health during care home.  He does continue to exercise but not as vigorously as he did years ago.  He was a runner in the past but states his knees did not allow him to run as much as he did prior.  His BMI today is 27.19.  This is not significantly increased over the last few years.  His most recent lipid profile on 11/09/2022 showed a total cholesterol 206, HDL 43, , and triglycerides 158.  He is presently taking Zetia 10 mg daily as he had problems with statins in the past.  The rest of his labs show a normal CBC, CMP, and TSH.  His screening hemoglobin A1c was normal at 5.6%.  He is followed by Urology for his prostate cancer and his most recent PSA on 08/29/2022 was 0.15.  His uric acid on 11/09/2022 was normal at 5.4 and he is tolerating his allopurinol well.  He has no other new complaints at this time.  As far as health maintenance, he is up-to-date with all of his recommended screening exams and immunizations with the exception of high-dose statin, but he is intolerant of statins in the past.  These caused severe muscle aches.    Review of Systems   Constitutional:  Negative for chills, fatigue, fever and unexpected weight change.   HENT:  Negative for nasal congestion, postnasal drip and sore throat.    Respiratory:  Negative for cough and shortness of breath.    Cardiovascular:  Negative for chest pain and palpitations.   Gastrointestinal:  Negative for abdominal pain, diarrhea, nausea and vomiting.   Genitourinary:  Negative for difficulty urinating, dysuria, flank pain and  frequency.        Nocturia times once per night   Musculoskeletal:  Negative for arthralgias and back pain.   Neurological:  Negative for dizziness, light-headedness and headaches.   Psychiatric/Behavioral:  Negative for sleep disturbance. The patient is not nervous/anxious.        Objective:      Physical Exam  Vitals reviewed.   Constitutional:       General: He is not in acute distress.     Appearance: Normal appearance. He is well-developed.   HENT:      Right Ear: Tympanic membrane and external ear normal.      Left Ear: Tympanic membrane and external ear normal.      Mouth/Throat:      Pharynx: Oropharynx is clear. No posterior oropharyngeal erythema.   Neck:      Thyroid: No thyromegaly.      Vascular: No carotid bruit.   Cardiovascular:      Rate and Rhythm: Normal rate and regular rhythm.      Pulses: Normal pulses.      Heart sounds: Normal heart sounds. No murmur heard.  Pulmonary:      Effort: Pulmonary effort is normal.      Breath sounds: Normal breath sounds. No wheezing or rales.   Genitourinary:     Comments: Followed by Urology for his history of prostate cancer  Musculoskeletal:         General: Normal range of motion.      Cervical back: Normal range of motion and neck supple.      Right lower leg: No edema.      Left lower leg: No edema.   Lymphadenopathy:      Cervical: No cervical adenopathy.   Neurological:      General: No focal deficit present.      Mental Status: He is alert and oriented to person, place, and time.      Cranial Nerves: No cranial nerve deficit.      Deep Tendon Reflexes: Reflexes are normal and symmetric.       Assessment:       Problem List Items Addressed This Visit       Hyperlipidemia - Primary    Hyperuricemia    Prostate cancer    Abdominal aortic atherosclerosis         Plan:       1. Continue present medication as his hyperlipidemia, hyperuricemia, and prostate cancer are stable and controlled   2. Treatment abdominal aortic atherosclerosis involves managing his  hyperlipidemia   3. Continue low-fat low-cholesterol diet and exercise.  BMI today is 27.19   4. Follow up with me in 1 year or p.r.n.  5.  Continue follow-up with Urology for his history of prostate cancer

## 2023-04-03 ENCOUNTER — OFFICE VISIT (OUTPATIENT)
Dept: UROLOGY | Facility: CLINIC | Age: 73
End: 2023-04-03
Payer: MEDICARE

## 2023-04-03 VITALS
SYSTOLIC BLOOD PRESSURE: 130 MMHG | HEART RATE: 61 BPM | BODY MASS INDEX: 27 KG/M2 | DIASTOLIC BLOOD PRESSURE: 86 MMHG | WEIGHT: 168 LBS | HEIGHT: 66 IN

## 2023-04-03 DIAGNOSIS — C61 PROSTATE CANCER: Primary | ICD-10-CM

## 2023-04-03 DIAGNOSIS — N50.89 SCROTAL SWELLING: ICD-10-CM

## 2023-04-03 DIAGNOSIS — N52.8 OTHER MALE ERECTILE DYSFUNCTION: ICD-10-CM

## 2023-04-03 PROCEDURE — 99999 PR PBB SHADOW E&M-EST. PATIENT-LVL III: CPT | Mod: PBBFAC,,, | Performed by: UROLOGY

## 2023-04-03 PROCEDURE — 99213 OFFICE O/P EST LOW 20 MIN: CPT | Mod: PBBFAC,PN | Performed by: UROLOGY

## 2023-04-03 PROCEDURE — 99214 OFFICE O/P EST MOD 30 MIN: CPT | Mod: S$PBB,,, | Performed by: UROLOGY

## 2023-04-03 PROCEDURE — 99999 PR PBB SHADOW E&M-EST. PATIENT-LVL III: ICD-10-PCS | Mod: PBBFAC,,, | Performed by: UROLOGY

## 2023-04-03 PROCEDURE — 99214 PR OFFICE/OUTPT VISIT, EST, LEVL IV, 30-39 MIN: ICD-10-PCS | Mod: S$PBB,,, | Performed by: UROLOGY

## 2023-04-03 RX ORDER — TADALAFIL 20 MG/1
20 TABLET ORAL DAILY PRN
Qty: 15 TABLET | Refills: 11 | Status: SHIPPED | OUTPATIENT
Start: 2023-04-03 | End: 2023-12-29 | Stop reason: SDUPTHER

## 2023-04-03 NOTE — PROGRESS NOTES
[facility] Established Patient/H&P:    Shree Heredia is a 73 y.o. male who presents for follow up for prostate cancer and gross hematuria.     Patient with a history of Kylee 3+3=6 prostate cancer diagnosed in 5/2018 with Dr. Hicks. He is now s/p brachytherapy in 9/2018 with Dr. Dobbs in [place].      He underwent initial bone scan which was negative. CT abdomen pelvis with a 2 mm right lung nodule. Stable on follow up CT chest.      He reports moderate lower urinary tract symptoms and erectile dysfunction after brachytherapy. He is managed with Flomax 0.4 mg and Cialis 5 mg for his symptoms.      On review of records, he last saw Dr. Hicks in 5/2020 for gross hematuria. CT revealed a left kidney stone. Urine cytology with atypical cells. Cystoscopy never performed. Hematuria has resolved.      Retired [employer]        Interval History     8/29/22: He is now s/p cystoscopy on 12/1/21 which revealed a friable, hypervascular, mildly obstructing prostate. Repeat urine cytology negative.      He remains on Flomax 0.4 mg  and Cialis 5 mg PO daily. He only reports mild incomplete emptying and nocturia x 2. Urine dipstick negative today. No recurrent gross hematuria.    4/3/23: Here today for follow up. No recent episodes of gross hematuria. He remains on Flomax 0.4 mg and Cialis 5 mg PO daily for LUTS and ED.     States that he has had some difficulty with maintaining an erection that has been worsening. Interested in Cialis as needed also. Of note, he reports bilateral scrotal cysts. States it was evaluated several years ago and he was told it was benign. Reports that he has noticed some growth of the cysts in his scrotum. Also has had moderate pain after intercourse.      Denies any fever, chills, flank pain, bone pain, unintentional weight loss or  trauma.         PSA  0.15  8/29/22  0.25                 9/10/21  0.43                 8/6/20  5.3                   3/2/18  8.4        "            2/1/18  1.1                   5/5/08     Testosterone  244                  10/10/13     Urine culture  No growth        5/12/20     IPSS    QoL  3          0          8/29/22      Past Medical History:   Diagnosis Date    Allergic rhinitis, seasonal     Gout attack     Hyperlipidemia     Prostate cancer     Prostate cancer 8/1/2019       Past Surgical History:   Procedure Laterality Date    BACK SURGERY  01/29/2016    CYSTOSCOPY N/A 12/1/2021    Procedure: CYSTOSCOPY;  Surgeon: Marek Guidry Jr., MD;  Location: [facility] OR;  Service: Urology;  Laterality: N/A;    INGUINAL HERNIA REPAIR         Review of patient's allergies indicates:   Allergen Reactions    Grass pollen-june grass standard Other (See Comments)     Triggers his sinuses       Medications Reviewed: see MAR    FOCUSED PHYSICAL EXAM:    Vitals:    04/03/23 1346   BP: 130/86   Pulse: 61     Body mass index is 27.12 kg/m². Weight: 76.2 kg (168 lb) Height: 5' 6" (167.6 cm)       General: Alert, cooperative, no distress, appears stated age  Abdomen: Soft, non-tender, no CVA tenderness, non-distended  : 10 cc right testicle, 20 cc left testicle with a presumed epididymal cyst      LABS:    No results found for this or any previous visit (from the past 336 hour(s)).        Assessment/Diagnosis:    1. Prostate cancer  PROSTATE SPECIFIC ANTIGEN, DIAGNOSTIC      2. Other male erectile dysfunction  tadalafiL (CIALIS) 20 MG Tab      3. Scrotal swelling  US Scrotum And Testicles          Plans:    - I spent 30 minutes of the day of this encounter preparing for, treating and managing this patient. Reviewed the NCCN guidelines with patient regarding surveillance after XRT which he completed in 9/2018. We discussed that he will require PSA every 6 months for approximately 5 years. PSA currently 0.15.   - PSA in 8/2023.   - Continue Cialis 5 mg PO daily for LUTS and ED.   - Cialis 20 mg as needed.   - Scrotal ultrasound next available.   - RTC in 1 year with " PSA.

## 2023-04-04 ENCOUNTER — OFFICE VISIT (OUTPATIENT)
Dept: ORTHOPEDICS | Facility: CLINIC | Age: 73
End: 2023-04-04
Payer: MEDICARE

## 2023-04-04 DIAGNOSIS — M22.42 CHONDROMALACIA OF LEFT PATELLA: ICD-10-CM

## 2023-04-04 DIAGNOSIS — M19.012 PRIMARY OSTEOARTHRITIS OF LEFT SHOULDER: ICD-10-CM

## 2023-04-04 DIAGNOSIS — M17.12 PRIMARY OSTEOARTHRITIS OF LEFT KNEE: ICD-10-CM

## 2023-04-04 DIAGNOSIS — M19.011 PRIMARY OSTEOARTHRITIS OF RIGHT SHOULDER: Primary | ICD-10-CM

## 2023-04-04 PROCEDURE — 99213 PR OFFICE/OUTPT VISIT, EST, LEVL III, 20-29 MIN: ICD-10-PCS | Mod: S$GLB,,, | Performed by: ORTHOPAEDIC SURGERY

## 2023-04-04 PROCEDURE — 99213 OFFICE O/P EST LOW 20 MIN: CPT | Mod: S$GLB,,, | Performed by: ORTHOPAEDIC SURGERY

## 2023-04-04 NOTE — PROGRESS NOTES
[facility] ORTHOPEDICS    Subjective:     Chief Complaint:   Chief Complaint   Patient presents with    Right Shoulder - Pain    Left Shoulder - Pain    Left Knee - Pain       Past Medical History:   Diagnosis Date    Allergic rhinitis, seasonal     Gout attack     Hyperlipidemia     Prostate cancer     Prostate cancer 8/1/2019       Past Surgical History:   Procedure Laterality Date    BACK SURGERY  01/29/2016    CYSTOSCOPY N/A 12/1/2021    Procedure: CYSTOSCOPY;  Surgeon: Maerk Guidry Jr., MD;  Location: [facility];  Service: Urology;  Laterality: N/A;    INGUINAL HERNIA REPAIR         Current Outpatient Medications   Medication Sig    allopurinoL (ZYLOPRIM) 100 MG tablet TAKE 2 TABLETS BY MOUTH EVERY DAY    aspirin 81 MG Chew Take 81 mg by mouth every other day.    cetirizine (ZYRTEC) 10 MG tablet Take 10 mg by mouth every evening.    colchicine (COLCRYS) 0.6 mg tablet TAKE 1 TABLET BY MOUTH EVERY DAY    ezetimibe (ZETIA) 10 mg tablet TAKE 1 TABLET BY MOUTH EVERY DAY    fluticasone (FLONASE) 50 mcg/actuation nasal spray 2 sprays (100 mcg total) by Each Nare route 2 (two) times daily.    omega-3 acid ethyl esters (LOVAZA) 1 gram capsule TAKE 2 CAPSULES BY MOUTH TWICE A DAY    tadalafiL (CIALIS) 20 MG Tab Take 1 tablet (20 mg total) by mouth daily as needed (As directed).    tadalafiL (CIALIS) 5 MG tablet Take 1 tablet (5 mg total) by mouth once daily.    tamsulosin (FLOMAX) 0.4 mg Cap Take 1 capsule by mouth once daily.    VYZULTA 0.024 % Drop Place 1 drop into both eyes once daily.     No current facility-administered medications for this visit.       Review of patient's allergies indicates:   Allergen Reactions    Grass pollen-ed grass standard Other (See Comments)     Triggers his sinuses       Family History   Problem Relation Age of Onset    Hypertension Mother     Diabetes Mother     Hyperlipidemia Mother     Heart disease Mother     Glaucoma Mother     Glaucoma Father     Hyperlipidemia Father     Diabetes  Father     Heart disease Sister     Diabetes Sister     Hypertension Sister     Heart disease Maternal Aunt     Heart disease Maternal Uncle        Social History     Socioeconomic History    Marital status:    Occupational History     Employer: [employer]   Tobacco Use    Smoking status: Former     Types: Cigarettes     Quit date: 2010     Years since quittin.2    Smokeless tobacco: Never   Substance and Sexual Activity    Alcohol use: Yes     Comment: soc    Drug use: Never    Sexual activity: Yes     Partners: Female     Social Determinants of Health     Financial Resource Strain: Low Risk     Difficulty of Paying Living Expenses: Not hard at all   Food Insecurity: No Food Insecurity    Worried About Running Out of Food in the Last Year: Never true    Ran Out of Food in the Last Year: Never true   Transportation Needs: No Transportation Needs    Lack of Transportation (Medical): No    Lack of Transportation (Non-Medical): No   Physical Activity: Sufficiently Active    Days of Exercise per Week: 5 days    Minutes of Exercise per Session: 60 min   Stress: No Stress Concern Present    Feeling of Stress : Only a little   Social Connections: Moderately Integrated    Frequency of Communication with Friends and Family: More than three times a week    Frequency of Social Gatherings with Friends and Family: More than three times a week    Attends [employer] Services: More than 4 times per year    Active Member of Clubs or Organizations: No    Attends Club or Organization Meetings: Never    Marital Status:    Housing Stability: Low Risk     Unable to Pay for Housing in the Last Year: No    Number of Places Lived in the Last Year: 1    Unstable Housing in the Last Year: No       History of present illness:  73-year-old male, last seen in the office in the beginning of February.  Here today for routine follow-up.  At that time we noted that he had advanced osteoarthritis in the bilateral shoulders with  significant glenohumeral joint changes and osteophyte formation.  As well as some pain in the left knee.  We injected all 3 joints.  He has had significant improvement in his symptoms and overall improvement in his function and range of motion.      Review of Systems:    Constitution: Negative for chills, fever, and sweats.  Negative for unexplained weight loss.    HENT:  Negative for headaches and blurry vision.    Cardiovascular:Negative for chest pain or irregular heart beat. Negative for hypertension.    Respiratory:  Negative for cough and shortness of breath.    Gastrointestinal: Negative for abdominal pain, heartburn, melena, nausea, and vomitting.    Genitourinary:  Negative bladder incontinence and dysuria.    Musculoskeletal:  See HPI for details.     Neurological: Negative for numbness.    Psychiatric/Behavioral: Negative for depression.  The patient is not nervous/anxious.      Endocrine: Negative for polyuria    Hematologic/Lymphatic: Negative for bleeding problem.  Does not bruise/bleed easily.    Skin: Negative for poor would healing and rash    Objective:      Physical Examination:    Vital Signs:  There were no vitals filed for this visit.    There is no height or weight on file to calculate BMI.    This a well-developed, well nourished patient in no acute distress.  They are alert and oriented and cooperative to examination.        Examination of the bilateral shoulders, the skin is dry and intact, no erythema or ecchymosis, no signs symptoms of infection.  Range of motion 170° of forward flexion, external rotation is only about 70° on the left, about 90° on the right.  Internal rotation is to lumbar sacral spine. Apolinar's test, no weakness with resisted testing bilaterally. Crepitus bilaterally.    Examination of the left knee, the skin is dry and intact, no erythema ecchymosis signs symptoms of infection.  No effusion is present.  Range of motion 0-120 degrees.  Stable anterior-posterior varus and  "valgus stress.  Calf soft nontender, straight leg raise negative.    Pertinent New Results:    XRAY Report / Interpretation:       Assessment/Plan:      73-year-old male with bilateral shoulder pain and left knee pain.  Advanced osteoarthritis with significant osteophyte formation in the glenohumeral joints bilaterally.  Responded well to injections with almost complete resolution in his right shoulder pain and greater than 80% improvement in his left shoulder pain with increased range of motion.  He also had significant improvement in his left knee pain.  His shoulder arthritis is advanced, I think that ultimately he will need a shoulder replacement but with the significant improvement he had with the steroid injections hopefully this will provide him some continued meaningful relief.  At this point will have him follow-up in 3 months, if he continues to have good relief in his symptoms he can simply follow up p.r.n..  Consider repeat injections or shoulder arthroplasty.  We spoke a great deal about shoulder arthroplasty today again.    Jonny Pérez, Physician Assistant, served in the capacity as a "scribe" for this patient encounter.  A "face-to-face" encounter occurred with Dr. Germán Carty on this date.  The treatment plan and medical decision-making is outlined above. Patient was seen and examined with a chaperone.       This note was created using Dragon voice recognition software that occasionally misinterpreted phrases or words.          "

## 2023-04-06 ENCOUNTER — HOSPITAL ENCOUNTER (OUTPATIENT)
Dept: RADIOLOGY | Facility: HOSPITAL | Age: 73
Discharge: HOME | End: 2023-04-06
Attending: UROLOGY
Payer: MEDICARE

## 2023-04-06 DIAGNOSIS — N50.89 SCROTAL SWELLING: ICD-10-CM

## 2023-04-06 PROCEDURE — 76870 US EXAM SCROTUM: CPT | Mod: 26,,, | Performed by: RADIOLOGY

## 2023-04-06 PROCEDURE — 76870 US SCROTUM AND TESTICLES: ICD-10-PCS | Mod: 26,,, | Performed by: RADIOLOGY

## 2023-04-06 PROCEDURE — 76870 US EXAM SCROTUM: CPT | Mod: TC

## 2023-04-12 ENCOUNTER — OFFICE VISIT (OUTPATIENT)
Dept: URGENT CARE | Facility: CLINIC | Age: 73
End: 2023-04-12
Payer: MEDICARE

## 2023-04-12 VITALS
OXYGEN SATURATION: 97 % | BODY MASS INDEX: 28.12 KG/M2 | RESPIRATION RATE: 16 BRPM | HEIGHT: 66 IN | DIASTOLIC BLOOD PRESSURE: 77 MMHG | SYSTOLIC BLOOD PRESSURE: 122 MMHG | WEIGHT: 175 LBS | HEART RATE: 71 BPM | TEMPERATURE: 98 F

## 2023-04-12 DIAGNOSIS — J06.9 UPPER RESPIRATORY TRACT INFECTION, UNSPECIFIED TYPE: Primary | ICD-10-CM

## 2023-04-12 PROCEDURE — 99214 OFFICE O/P EST MOD 30 MIN: CPT | Mod: S$GLB,,, | Performed by: STUDENT IN AN ORGANIZED HEALTH CARE EDUCATION/TRAINING PROGRAM

## 2023-04-12 PROCEDURE — 99214 PR OFFICE/OUTPT VISIT, EST, LEVL IV, 30-39 MIN: ICD-10-PCS | Mod: S$GLB,,, | Performed by: STUDENT IN AN ORGANIZED HEALTH CARE EDUCATION/TRAINING PROGRAM

## 2023-04-12 RX ORDER — PROMETHAZINE HYDROCHLORIDE AND DEXTROMETHORPHAN HYDROBROMIDE 6.25; 15 MG/5ML; MG/5ML
5 SYRUP ORAL EVERY 4 HOURS PRN
Qty: 118 ML | Refills: 0 | Status: SHIPPED | OUTPATIENT
Start: 2023-04-12 | End: 2023-04-22

## 2023-04-12 RX ORDER — GUAIFENESIN 600 MG/1
1200 TABLET, EXTENDED RELEASE ORAL 2 TIMES DAILY
Qty: 30 TABLET | Refills: 0 | Status: SHIPPED | OUTPATIENT
Start: 2023-04-12 | End: 2024-03-14 | Stop reason: SDUPTHER

## 2023-04-12 RX ORDER — AZITHROMYCIN 250 MG/1
TABLET, FILM COATED ORAL
Qty: 6 TABLET | Refills: 0 | Status: SHIPPED | OUTPATIENT
Start: 2023-04-12 | End: 2023-07-06 | Stop reason: ALTCHOICE

## 2023-04-12 RX ORDER — LEVOCETIRIZINE DIHYDROCHLORIDE 5 MG/1
5 TABLET, FILM COATED ORAL NIGHTLY
Qty: 30 TABLET | Refills: 0 | Status: SHIPPED | OUTPATIENT
Start: 2023-04-12 | End: 2024-03-14 | Stop reason: ALTCHOICE

## 2023-04-12 NOTE — PROGRESS NOTES
"Subjective:      Patient ID: Shree Heredia is a 73 y.o. male.    Vitals:  height is 5' 6" (1.676 m) and weight is 79.4 kg (175 lb). His oral temperature is 97.6 °F (36.4 °C). His blood pressure is 122/77 and his pulse is 71. His respiration is 16 and oxygen saturation is 97%.     Chief Complaint: Sinus Problem    Ambulatory to room with complaint of sinus congestion, sinus pressure, cough.  States gets sinus infections every 3-4 months, this particular infection has lasted 2 weeks, has tried multiple over-the-counter medications without relief, requesting antibiotic today.  Denies subjective fevers, denies nausea vomiting diarrhea, denies chest pain or palpitations.    Sinus Problem  This is a new problem. The current episode started 1 to 4 weeks ago (2 weeks ago). Associated symptoms include congestion, headaches and sinus pressure. Treatments tried: otc meds. The treatment provided no relief.     HENT:  Positive for congestion and sinus pressure.    Neurological:  Positive for headaches.    Objective:     Physical Exam   Constitutional: He is oriented to person, place, and time. He appears well-developed. He is cooperative.  Non-toxic appearance. He does not appear ill. No distress.   HENT:   Head: Normocephalic and atraumatic.   Ears:   Right Ear: Hearing, tympanic membrane, external ear and ear canal normal.   Left Ear: Hearing, tympanic membrane, external ear and ear canal normal.   Nose: Rhinorrhea and congestion present. No mucosal edema or nasal deformity. No epistaxis. Right sinus exhibits no maxillary sinus tenderness and no frontal sinus tenderness. Left sinus exhibits no maxillary sinus tenderness and no frontal sinus tenderness.   Mouth/Throat: Uvula is midline, oropharynx is clear and moist and mucous membranes are normal. No trismus in the jaw. Normal dentition. No uvula swelling. No oropharyngeal exudate, posterior oropharyngeal edema or posterior oropharyngeal erythema.   Eyes: Conjunctivae " and lids are normal. No scleral icterus.   Neck: Trachea normal and phonation normal. Neck supple. No edema present. No erythema present. No neck rigidity present.   Cardiovascular: Normal rate, regular rhythm, normal heart sounds and normal pulses.   Pulmonary/Chest: Effort normal and breath sounds normal. No respiratory distress. He has no decreased breath sounds. He has no rhonchi.   Abdominal: Normal appearance.   Musculoskeletal: Normal range of motion.         General: No deformity. Normal range of motion.   Neurological: He is alert and oriented to person, place, and time. He exhibits normal muscle tone. Coordination normal.   Skin: Skin is warm, dry, intact, not diaphoretic and not pale.   Psychiatric: His speech is normal and behavior is normal. Judgment and thought content normal.   Nursing note and vitals reviewed.    Assessment:     1. Upper respiratory tract infection, unspecified type        Plan:       Upper respiratory tract infection, unspecified type    Other orders  -     azithromycin (Z-LACHELLE) 250 MG tablet; Take 2 tablets by mouth on day 1; Take 1 tablet by mouth on days 2-5  Dispense: 6 tablet; Refill: 0  -     levocetirizine (XYZAL) 5 MG tablet; Take 1 tablet (5 mg total) by mouth every evening.  Dispense: 30 tablet; Refill: 0  -     promethazine-dextromethorphan (PROMETHAZINE-DM) 6.25-15 mg/5 mL Syrp; Take 5 mLs by mouth every 4 (four) hours as needed.  Dispense: 118 mL; Refill: 0  -     guaiFENesin (MUCINEX) 600 mg 12 hr tablet; Take 2 tablets (1,200 mg total) by mouth 2 (two) times daily.  Dispense: 30 tablet; Refill: 0              Discussed symptomatic treatment, wrote for antibiotics secondary to duration of symptoms.

## 2023-04-12 NOTE — PATIENT INSTRUCTIONS
[facility]  HISTORY AND PHYSICAL      Patient: Ronnie Fernandez Date: 9/30/2020   male, 63 year old  Admit Date: 9/30/2020   Attending: Lazaro Brooks MD        PRIMARY CARE PROVIDER:  Stephie Mix MD     ATTENDING PHYSICIAN    Lazaro Brooks MD      CHIEF COMPLAINT    Dizziness and high blood sugar    HPI    Ronnie Fernandez is a 63 year old male with past medical history significant for coronary arthrosclerosis, diabetes mellitus type 2 with hyperglycemia, dyslexia, pulmonary edema with volume overload chronic kidney disease stage 2 presenting to the emergency department with dizziness and hyperglycemia.  Patient was recently admitted and discharged from the hospitalization from September 16th through 26th.  At do that time patient was treated for acute diastolic congestive heart failure, generalized anasarca, chronic AFib, diabetes mellitus type 2, volume overload.  Review of the chart shows the patient was diuresed about 30 L at that point in time.  On the day of the discharge patient had a BUN of 46 creatinine 1.34 in blood glucose of 331.  Patient was discharged home with Lasix metformin and insulin.  Patient reported that he was doing well up until today.  Denied any chest pain or shortness of breath.  Patient is not observing fluid restriction at home.  Patient reported that his blood sugar has been high at home but today get dizzy lightheaded.  Patient also had some taco last night.  When he woke up this morning give himself insulin than a breakfast.  He checked his blood sugar which was over 500. Patient reported increasing urinary frequency but denied any dysuria urgency.      In the emergency department patient was afebrile heart rate was in the 60s to 80s.  Initial blood pressure was 93/55 with a map of 70. Lab work significant for sodium of 130, potassium 5.3, BUN 80, creatinine 2.28 which is above 46 and 1.34 at the time of the last discharge.  Chest x-ray today showed cardiomegaly with  Thankyou for the opportunity to care for you today.  Please take all medications as directed, continue any previous prescribed medications unless we specifically discussed holding them.  If your symptoms do not resolve or worsen please return to the clinic for re-evaluation, if your situation becomes emergent please present to to the nearest emergency department.  Follow-up with your PCP for continued evaluation and management.     mild fluid overload or vascular congestion.  Patient admitted to the hospitalist service secondary to acute on chronic kidney injury with dizziness mild hypotension and hyperglycemia    PAST MEDICAL HISTORY    Past Medical History:   Diagnosis Date   • Cervical spondylosis without myelopathy    • Chronic sinusitis    • Coronary atherosclerosis    • Depressive disorder    • Diabetes mellitus, type 2 (CMS/HCC)    • Dyslexia    • Eczema    • Essential hypertension    • Hyperlipoproteinemia    • Insomnia    • Morbid obesity (CMS/HCC)    • Presbyopia    • Proliferative diabetic retinopathy (CMS/HCC)    • Psoriasis    • Restrictive lung disease    • Seborrheic dermatitis    • Sleep apnea    • Vitreous hemorrhage (CMS/HCC)        SURGICAL HISTORY    Past Surgical History:   Procedure Laterality Date   • Cardiac catherization     • Carpal tunnel release     • Colonoscopy      with biopsy   • Tonsillectomy and adenoidectomy         FAMILY HISTORY    Family History   Problem Relation Age of Onset   • Diabetes Father    • Heart Father 70        MI   • Heart disease Sister    • Diabetes Sister    • Heart Mother 61        MI   • Diabetes Brother        SOCIAL HISTORY    Social History     Tobacco Use   • Smoking status: Never Smoker   • Smokeless tobacco: Never Used   Substance Use Topics   • Alcohol use: No     Frequency: Never   • Drug use: No       CURRENT MEDICATIONS    Outpatient Medications Marked as Taking for the 9/30/20 encounter (Hospital Encounter)   Medication Sig Dispense Refill   • empagliflozin (Jardiance) 10 MG tablet Take 10 mg by mouth daily (before breakfast).     • furosemide (LASIX) 80 MG tablet Take 1 tablet by mouth 2 times daily. 60 tablet 0   • insulin regular human, CONCENTRATED, (HumuLIN R U-500 KwikPen) 500 UNIT/ML pen-injector Inject 35 Units into the skin 3 times daily (before meals). Prime 5 units before each dose. 6 mL 12   • metoPROLOL tartrate (LOPRESSOR) 25 MG tablet Take 1 tablet by mouth  every 12 hours. 60 tablet 0   • potassium CHLORIDE (KLOR-CON M) 20 MEQ brittany ER tablet Take 1 tablet by mouth daily. 30 tablet 0   • Cholecalciferol 50 mcg (2,000 units) tablet Take 50 mcg by mouth daily.     • QUEtiapine (SEROquel) 50 MG tablet Take 50 mg by mouth daily.     • ARIPiprazole (ABILIFY) 5 MG tablet Take 1 tablet by mouth daily. 90 tablet 1   • nortriptyline (PAMELOR) 50 MG capsule Take 2 capsules by mouth nightly. 180 capsule 1   • docusate sodium (COLACE) 100 MG capsule Take 100 mg by mouth daily as needed for Constipation.     • omeprazole (PrilOSEC) 20 MG capsule Take 20 mg by mouth daily as needed (reflux).     • Magnesium 400 MG Tab Take 400 mg by mouth daily.      • apixaBAN (ELIQUIS) 5 MG Tab Take 1 tablet by mouth every 12 hours. 60 tablet 3   • dilTIAZem (Cardizem CD) 240 MG 24 hr capsule Take 1 capsule by mouth daily. 30 capsule 3   • aspirin 81 MG chewable tablet Chew 1 tablet by mouth daily. 30 tablet 3   • lisinopril (ZESTRIL) 20 MG tablet Take 0.5 tablets by mouth daily. 15 tablet 0   • acitretin (SORIATANE) 25 MG capsule Take 1 capsule by mouth daily. Take with fattiest meal of the day 30 capsule 0   • metFORMIN (GLUCOPHAGE-XR) 500 MG 24 hr tablet TAKE ONE TABLET BY MOUTH TWICE DAILY WITH FOOD 180 tablet 0   • atorvastatin (LIPITOR) 40 MG tablet Take 1 tablet by mouth daily. 90 tablet 0   • tamsulosin (FLOMAX) 0.4 MG Cap Take 1 capsule by mouth daily after a meal. 30 capsule 3   • fluticasone (FLONASE) 50 MCG/ACT nasal spray Spray 2 sprays in each nostril daily as needed (allergies).      • albuterol (ProAir HFA) 108 (90 Base) MCG/ACT inhaler Inhale 2 puffs into the lungs every 4 to 6 hours as needed for Shortness of Breath or Wheezing.         Medications Prior to Admission   Medication Sig Dispense Refill   • empagliflozin (Jardiance) 10 MG tablet Take 10 mg by mouth daily (before breakfast).     • furosemide (LASIX) 80 MG tablet Take 1 tablet by mouth 2 times daily. 60 tablet 0   •  insulin regular human, CONCENTRATED, (HumuLIN R U-500 KwikPen) 500 UNIT/ML pen-injector Inject 35 Units into the skin 3 times daily (before meals). Prime 5 units before each dose. 6 mL 12   • metoPROLOL tartrate (LOPRESSOR) 25 MG tablet Take 1 tablet by mouth every 12 hours. 60 tablet 0   • potassium CHLORIDE (KLOR-CON M) 20 MEQ brittany ER tablet Take 1 tablet by mouth daily. 30 tablet 0   • Cholecalciferol 50 mcg (2,000 units) tablet Take 50 mcg by mouth daily.     • QUEtiapine (SEROquel) 50 MG tablet Take 50 mg by mouth daily.     • ARIPiprazole (ABILIFY) 5 MG tablet Take 1 tablet by mouth daily. 90 tablet 1   • nortriptyline (PAMELOR) 50 MG capsule Take 2 capsules by mouth nightly. 180 capsule 1   • docusate sodium (COLACE) 100 MG capsule Take 100 mg by mouth daily as needed for Constipation.     • omeprazole (PrilOSEC) 20 MG capsule Take 20 mg by mouth daily as needed (reflux).     • Magnesium 400 MG Tab Take 400 mg by mouth daily.      • apixaBAN (ELIQUIS) 5 MG Tab Take 1 tablet by mouth every 12 hours. 60 tablet 3   • dilTIAZem (Cardizem CD) 240 MG 24 hr capsule Take 1 capsule by mouth daily. 30 capsule 3   • aspirin 81 MG chewable tablet Chew 1 tablet by mouth daily. 30 tablet 3   • lisinopril (ZESTRIL) 20 MG tablet Take 0.5 tablets by mouth daily. 15 tablet 0   • acitretin (SORIATANE) 25 MG capsule Take 1 capsule by mouth daily. Take with fattiest meal of the day 30 capsule 0   • metFORMIN (GLUCOPHAGE-XR) 500 MG 24 hr tablet TAKE ONE TABLET BY MOUTH TWICE DAILY WITH FOOD 180 tablet 0   • atorvastatin (LIPITOR) 40 MG tablet Take 1 tablet by mouth daily. 90 tablet 0   • tamsulosin (FLOMAX) 0.4 MG Cap Take 1 capsule by mouth daily after a meal. 30 capsule 3   • fluticasone (FLONASE) 50 MCG/ACT nasal spray Spray 2 sprays in each nostril daily as needed (allergies).      • albuterol (ProAir HFA) 108 (90 Base) MCG/ACT inhaler Inhale 2 puffs into the lungs every 4 to 6 hours as needed for Shortness of Breath or  Wheezing.          ALLERGIES    ALLERGIES:   Allergen Reactions   • Duloxetine Hcl Other (See Comments)     paranoid   • Unknown Other (See Comments)     States allergic to some antidepressant, but can't remember what       REVIEW OF SYSTEMS    All 13 Review of Systems negative except for what's listed in the HPI.      PHYSICAL EXAM    Vital 24 Hour Range Most Recent Value   Temperature Temp  Min: 97.7 °F (36.5 °C)  Max: 97.9 °F (36.6 °C) 97.8 °F (36.6 °C)   Pulse Pulse  Min: 68  Max: 107 (!) 107   Respiratory Resp  Min: 18  Max: 18 18   Blood Pressure BP  Min: 93/55  Max: 150/81 (!) 150/81   Pulse Oximetry SpO2  Min: 95 %  Max: 98 % 98 %   Arterial BP No data recorded     O2 No data recorded       Vital Most Recent Value First Value   Weight 131.4 kg Weight: 131.4 kg   Height 5' 9\" (175.3 cm) Height: 5' 9\" (175.3 cm)   BMI 42.78 N/A       Examination:    General:  well developed, well nourished, no apparent distress and Resting comfortably in a chair able to answer questions appropriately.  HEENT: normocephalic, atraumatic, normal conjunctivae and lids and EOMI  Neck:  trachea midline  CV: regular rate and rhythm and no murmurs, rubs, or thrills  Resp: clear to auscultation bilaterally, diminished bilateral bases, no accessory muscle use  and Good air entry without wheezing, rales, rhonchi.  Abd: soft, nontender, nondistended, no hepatosplenomegaly, bowel sounds  present and Obese abdomen unable to appreciate any organomegaly.  No guarding or rigidity to palpation  Ext: no rashes, lesions, or ulcers noted and 1+ pitting edema in bilateral lower extremities  Neuro: CN 2-12 grossly intact and normal sensation   Psych: normal judgement and insight, oriented to time, place and person and normal mood and affect        LABS    Recent Labs   Lab 09/30/20  1159 09/26/20  0823 09/25/20  1935 09/25/20  0549   SODIUM 130* 136  --   --    POTASSIUM 5.3* 4.2 3.6 3.3*   CHLORIDE 92* 95*  --   --    CO2 28 31  --   --    BUN 80*  46*  --   --    CREATININE 2.28* 1.34*  --   --    GLUCOSE 398* 331*  --   --    WBC 7.5 7.2  --  7.2   HGB 9.7* 10.9*  --  9.7*   HCT 29.1* 34.1*  --  30.4*    240  --  222     Recent Labs   Lab 09/30/20  1159   RAPDTR <0.02     No results found for this or any previous visit.   No results found for this or any previous visit.  Lab Results   Component Value Date    USPG 1.015 09/16/2020    UPROT 30  (A) 09/16/2020    UWBC Moderate (A) 09/16/2020    URBC Large (A) 09/16/2020    UPH 6.0 09/16/2020    UBACTR NONE SEEN 09/09/2019       IMAGING & OTHER STUDIES    Xr Chest Ap Or Pa - Portable    Result Date: 9/30/2020  Narrative: HISTORY: Hyperglycemia EXAM: AP chest x-ray COMPARISON: September 22, 2020 FINDINGS: There is no focal infiltrate. The the heart is globally enlarged, a stable finding. There may be a small amount of fluid overload or vascular congestion. The mediastinal contour is normal. There is no pneumothorax.     Impression: IMPRESSION: Cardiomegaly with mild fluid overload or vascular congestion.    Xr Chest Ap Or Pa    Result Date: 9/22/2020  Narrative: EXAM: XR CHEST AP OR PA DATE OF EXAM: 9/22/2020 3:00 PM. COMPARISON: 9/16/2020. CLINICAL INFORMATION: dyspnea. FINDINGS: The heart is moderately enlarged. Pulmonary vessels are mildly congested. Retrocardiac opacity evident. This is new from previous.     Impression: IMPRESSION: Findings suggest an element of congestive failure. Retrocardiac opacity evident consistent with the fluid atelectasis or infiltrate.    Xr Chest Ap Or Pa - Portable    Result Date: 9/16/2020  Narrative: XR CHEST AP OR PA DATE OF EXAM:  9/16/2020 12:35 PM. CLINICAL INFORMATION:  SOB. TECHNIQUE: AP upright view of the chest performed at 1221 hours. COMPARISON: 9/1/2020. FINDINGS: Overlying EKG leads. Prominence of the cardiomediastinal silhouette accentuated by AP technique, similar to prior. No vascular congestion. Minimal interstitial opacity at the right base, likely  atelectasis. No focal consolidation. No effusion or pneumothorax.     Impression: IMPRESSION:  1. Minimal right basilar interstitial opacity, likely atelectasis. 2. No consolidation or effusion.    Xr Chest Ap Or Pa    Result Date: 9/1/2020  Narrative: XR CHEST AP OR PA HISTORY: As ordered: chest pain . COMPARISON: 8/14/2020 . TECHNIQUE: The anterior Chest image was performed at  9/1/2020 9:40 AM FINDINGS: Lines and catheters: None. Lungs and pleura:  The  lungs appear clear. No fluid is noted. Heart: Cardiomegaly appears increased. Pulmonary vessels: Appear normal.  Suboptimal inhalation with diaphragmatic elevation again noted     Impression: IMPRESSION: Increase of apparent mild cardiomegaly. No lung infiltrate, no failure changes.        Assessment/Plan:       Reason for Test:  SARS CoV-2 test: ordered due to asymptomatic screening for admission (non PUI)    Hypotension -   Patient presented with dizziness today initial blood pressure was low in a emergency department.  Wondering about possibly T of over diuresis from last hospitalization.  Will hold Lasix for now.  Gentle IV hydration.  Repeat blood pressure.  One blood pressure is stable can resume Lasix at a lower dose.     Acute kidney injury on chronic kidney disease stage 3 likely secondary to ATN -   Upon last discharge patient BUN was 46 and creatinine 1.34 which is likely close to the baseline.  BUN today is 80 creatinine of 2.28 likely secondary to over diuresis versus hypotension causing acute kidney injury from ATN.  Check urine creatinine sodium.  IV hydration.  Check renal ultrasound.  Repeat BMP in a.m. hold nephrotoxic agent    Diabetes mellitus type 2 with hyperglycemia -   Continue home dose of 235 units before each meal.  Consistent carb diet.  Will not prescribe metformin hospital given really worsening renal failure.    Generalized anasarca -   Will give gentle IV hydration at this point in time given acute kidney injury and hypotension.   Will resume Lasix starting tomorrow morning.      Essential hypertension -   Continue home medications          Generalized anasarca -   Resume Lasix tomorrow a.m..  Daily weights.      Smoking status- non smoker    Nutrition status- appropriate    Body mass index is 42.78 kg/m². - class 3 obesity BMI >40    Code status- Full Resuscitation     DVT Prophylaxis - Eliquis    The patients treatment plans were discussed with patient and RN      Is the patient expected to require a two midnight stay in the hospital? No I certify that I expect inpatient services for greater than two midnights are medically necessary for this patient. Please see H&P and MD Progress Notes for additional information about the patient's course of treatment.          Lazaro Brooks MD    9/30/2020    9:53 PM

## 2023-06-19 ENCOUNTER — OFFICE VISIT (OUTPATIENT)
Dept: URGENT CARE | Facility: CLINIC | Age: 73
End: 2023-06-19
Payer: MEDICARE

## 2023-06-19 VITALS
BODY MASS INDEX: 27.8 KG/M2 | RESPIRATION RATE: 16 BRPM | HEART RATE: 63 BPM | HEIGHT: 66 IN | WEIGHT: 173 LBS | DIASTOLIC BLOOD PRESSURE: 72 MMHG | TEMPERATURE: 97 F | OXYGEN SATURATION: 97 % | SYSTOLIC BLOOD PRESSURE: 111 MMHG

## 2023-06-19 DIAGNOSIS — R09.81 SINUS CONGESTION: Primary | ICD-10-CM

## 2023-06-19 PROCEDURE — 99213 OFFICE O/P EST LOW 20 MIN: CPT | Mod: S$GLB,,, | Performed by: STUDENT IN AN ORGANIZED HEALTH CARE EDUCATION/TRAINING PROGRAM

## 2023-06-19 PROCEDURE — 99213 PR OFFICE/OUTPT VISIT, EST, LEVL III, 20-29 MIN: ICD-10-PCS | Mod: S$GLB,,, | Performed by: STUDENT IN AN ORGANIZED HEALTH CARE EDUCATION/TRAINING PROGRAM

## 2023-06-19 RX ORDER — FLUTICASONE PROPIONATE 50 MCG
1 SPRAY, SUSPENSION (ML) NASAL DAILY
Qty: 11.1 ML | Refills: 0 | Status: SHIPPED | OUTPATIENT
Start: 2023-06-19

## 2023-06-19 RX ORDER — AZITHROMYCIN 250 MG/1
TABLET, FILM COATED ORAL
Qty: 6 TABLET | Refills: 0 | Status: SHIPPED | OUTPATIENT
Start: 2023-06-19 | End: 2023-07-06 | Stop reason: ALTCHOICE

## 2023-06-19 RX ORDER — GUAIFENESIN 600 MG/1
1200 TABLET, EXTENDED RELEASE ORAL 2 TIMES DAILY
Qty: 30 TABLET | Refills: 0 | Status: SHIPPED | OUTPATIENT
Start: 2023-06-19 | End: 2024-03-14 | Stop reason: ALTCHOICE

## 2023-06-19 RX ORDER — LEVOCETIRIZINE DIHYDROCHLORIDE 5 MG/1
5 TABLET, FILM COATED ORAL NIGHTLY
Qty: 30 TABLET | Refills: 0 | Status: SHIPPED | OUTPATIENT
Start: 2023-06-19 | End: 2024-03-14 | Stop reason: SDUPTHER

## 2023-06-19 NOTE — PROGRESS NOTES
"Subjective:      Patient ID: Shree Heredia is a 73 y.o. male.    Vitals:  height is 5' 6" (1.676 m) and weight is 78.5 kg (173 lb). His temperature is 97.2 °F (36.2 °C). His blood pressure is 111/72 and his pulse is 63. His respiration is 16 and oxygen saturation is 97%.     Chief Complaint: Sinus Problem    Ambulatory to room with complaint of sinus congestion, headache, sinus drainage, rhinorrhea.  Symptoms have been persistent for about 1 week now.  Has tried over-the-counter medications without relief.  Denies subjective fevers.    Constitution: Positive for fatigue. Negative for fever.   HENT:  Positive for sinus pain and sinus pressure.    Neurological:  Positive for headaches.    Objective:     Physical Exam   Constitutional: He is oriented to person, place, and time. He appears well-developed. He is cooperative.  Non-toxic appearance. He does not appear ill. No distress.   HENT:   Head: Normocephalic and atraumatic.   Ears:   Right Ear: Hearing, tympanic membrane, external ear and ear canal normal.   Left Ear: Hearing, tympanic membrane, external ear and ear canal normal.   Nose: Rhinorrhea present. No mucosal edema or nasal deformity. No epistaxis. Right sinus exhibits no maxillary sinus tenderness and no frontal sinus tenderness. Left sinus exhibits no maxillary sinus tenderness and no frontal sinus tenderness.   Mouth/Throat: Uvula is midline and mucous membranes are normal. No trismus in the jaw. Normal dentition. No uvula swelling. Posterior oropharyngeal erythema present. No oropharyngeal exudate or posterior oropharyngeal edema.   Eyes: Conjunctivae and lids are normal. No scleral icterus.   Neck: Trachea normal and phonation normal. Neck supple. No edema present. No erythema present. No neck rigidity present.   Cardiovascular: Normal rate, regular rhythm, normal heart sounds and normal pulses.   Pulmonary/Chest: Effort normal and breath sounds normal. No respiratory distress. He has no " decreased breath sounds. He has no rhonchi.   Abdominal: Normal appearance.   Musculoskeletal: Normal range of motion.         General: No deformity. Normal range of motion.   Neurological: He is alert and oriented to person, place, and time. He exhibits normal muscle tone. Coordination normal.   Skin: Skin is warm, dry, intact, not diaphoretic and not pale.   Psychiatric: His speech is normal and behavior is normal. Judgment and thought content normal.   Nursing note and vitals reviewed.    Assessment:     1. Sinus congestion        Plan:       Sinus congestion    Other orders  -     levocetirizine (XYZAL) 5 MG tablet; Take 1 tablet (5 mg total) by mouth every evening.  Dispense: 30 tablet; Refill: 0  -     guaiFENesin (MUCINEX) 600 mg 12 hr tablet; Take 2 tablets (1,200 mg total) by mouth 2 (two) times daily.  Dispense: 30 tablet; Refill: 0  -     fluticasone propionate (FLONASE) 50 mcg/actuation nasal spray; 1 spray (50 mcg total) by Each Nostril route once daily.  Dispense: 11.1 mL; Refill: 0  -     azithromycin (Z-LACHELLE) 250 MG tablet; Take 2 tablets by mouth on day 1; Take 1 tablet by mouth on days 2-5  Dispense: 6 tablet; Refill: 0              We will prescribe antibiotics secondary to duration of symptoms.  Discussed symptomatic treatment.

## 2023-07-06 ENCOUNTER — OFFICE VISIT (OUTPATIENT)
Dept: ORTHOPEDICS | Facility: CLINIC | Age: 73
End: 2023-07-06
Payer: MEDICARE

## 2023-07-06 VITALS
WEIGHT: 165 LBS | HEIGHT: 66 IN | DIASTOLIC BLOOD PRESSURE: 70 MMHG | BODY MASS INDEX: 26.52 KG/M2 | SYSTOLIC BLOOD PRESSURE: 106 MMHG

## 2023-07-06 DIAGNOSIS — M19.012 PRIMARY OSTEOARTHRITIS OF LEFT SHOULDER: ICD-10-CM

## 2023-07-06 DIAGNOSIS — M19.011 PRIMARY OSTEOARTHRITIS OF RIGHT SHOULDER: Primary | ICD-10-CM

## 2023-07-06 PROCEDURE — 99213 PR OFFICE/OUTPT VISIT, EST, LEVL III, 20-29 MIN: ICD-10-PCS | Mod: 25,S$GLB,, | Performed by: PHYSICIAN ASSISTANT

## 2023-07-06 PROCEDURE — 20610 LARGE JOINT ASPIRATION/INJECTION: R GLENOHUMERAL: ICD-10-PCS | Mod: 50,S$GLB,, | Performed by: PHYSICIAN ASSISTANT

## 2023-07-06 PROCEDURE — 99213 OFFICE O/P EST LOW 20 MIN: CPT | Mod: 25,S$GLB,, | Performed by: PHYSICIAN ASSISTANT

## 2023-07-06 PROCEDURE — 20610 DRAIN/INJ JOINT/BURSA W/O US: CPT | Mod: 50,S$GLB,, | Performed by: PHYSICIAN ASSISTANT

## 2023-07-06 RX ORDER — TRIAMCINOLONE ACETONIDE 40 MG/ML
40 INJECTION, SUSPENSION INTRA-ARTICULAR; INTRAMUSCULAR
Status: DISCONTINUED | OUTPATIENT
Start: 2023-07-06 | End: 2023-07-06 | Stop reason: HOSPADM

## 2023-07-06 RX ADMIN — TRIAMCINOLONE ACETONIDE 40 MG: 40 INJECTION, SUSPENSION INTRA-ARTICULAR; INTRAMUSCULAR at 10:07

## 2023-07-06 NOTE — PROGRESS NOTES
[facility] ORTHOPEDICS  [address]  [place] [address]  Phone: [phone]   [phone]    Patient's PCP: Brendan Frank Jr, MD  Referring Provider: No ref. provider found    Subjective:      Chief Complaint:   Chief Complaint   Patient presents with    Left Shoulder - Pain     Had bilateral shoulder injection done 2/7/23, which did help for about 2-3 months, ROM is limited and painful    Right Shoulder - Pain       Past Medical History:   Diagnosis Date    Allergic rhinitis, seasonal     Gout attack     Hyperlipidemia     Prostate cancer     Prostate cancer 8/1/2019       Past Surgical History:   Procedure Laterality Date    BACK SURGERY  01/29/2016    CYSTOSCOPY N/A 12/1/2021    Procedure: CYSTOSCOPY;  Surgeon: Marek Guidry Jr., MD;  Location: [facility];  Service: Urology;  Laterality: N/A;    INGUINAL HERNIA REPAIR         Current Outpatient Medications   Medication Sig    allopurinoL (ZYLOPRIM) 100 MG tablet TAKE 2 TABLETS BY MOUTH EVERY DAY    aspirin 81 MG Chew Take 81 mg by mouth every other day.    cetirizine (ZYRTEC) 10 MG tablet Take 10 mg by mouth every evening.    colchicine (COLCRYS) 0.6 mg tablet TAKE 1 TABLET BY MOUTH EVERY DAY    ezetimibe (ZETIA) 10 mg tablet TAKE 1 TABLET BY MOUTH EVERY DAY    fluticasone (FLONASE) 50 mcg/actuation nasal spray 2 sprays (100 mcg total) by Each Nare route 2 (two) times daily.    fluticasone propionate (FLONASE) 50 mcg/actuation nasal spray 1 spray (50 mcg total) by Each Nostril route once daily.    guaiFENesin (MUCINEX) 600 mg 12 hr tablet Take 2 tablets (1,200 mg total) by mouth 2 (two) times daily.    guaiFENesin (MUCINEX) 600 mg 12 hr tablet Take 2 tablets (1,200 mg total) by mouth 2 (two) times daily.    levocetirizine (XYZAL) 5 MG tablet Take 1 tablet (5 mg total) by mouth every evening.    levocetirizine (XYZAL) 5 MG tablet Take 1 tablet (5 mg total) by mouth every evening.    omega-3 acid ethyl esters (LOVAZA) 1 gram capsule TAKE 2  CAPSULES BY MOUTH TWICE A DAY    tadalafiL (CIALIS) 20 MG Tab Take 1 tablet (20 mg total) by mouth daily as needed (As directed).    tadalafiL (CIALIS) 5 MG tablet Take 1 tablet (5 mg total) by mouth once daily.    tamsulosin (FLOMAX) 0.4 mg Cap TAKE 1 CAPSULE BY MOUTH EVERY DAY    VYZULTA 0.024 % Drop Place 1 drop into both eyes once daily.     No current facility-administered medications for this visit.       Review of patient's allergies indicates:   Allergen Reactions    Grass pollen-ed grass standard Other (See Comments)     Triggers his sinuses       Family History   Problem Relation Age of Onset    Hypertension Mother     Diabetes Mother     Hyperlipidemia Mother     Heart disease Mother     Glaucoma Mother     Glaucoma Father     Hyperlipidemia Father     Diabetes Father     Heart disease Sister     Diabetes Sister     Hypertension Sister     Heart disease Maternal Aunt     Heart disease Maternal Uncle        Social History     Socioeconomic History    Marital status:    Occupational History     Employer: [employer]   Tobacco Use    Smoking status: Former     Types: Cigarettes     Quit date: 2010     Years since quittin.5    Smokeless tobacco: Never   Substance and Sexual Activity    Alcohol use: Yes     Comment: soc    Drug use: Never    Sexual activity: Yes     Partners: Female     Social Determinants of Health     Financial Resource Strain: Low Risk     Difficulty of Paying Living Expenses: Not hard at all   Food Insecurity: No Food Insecurity    Worried About Running Out of Food in the Last Year: Never true    Ran Out of Food in the Last Year: Never true   Transportation Needs: No Transportation Needs    Lack of Transportation (Medical): No    Lack of Transportation (Non-Medical): No   Physical Activity: Sufficiently Active    Days of Exercise per Week: 5 days    Minutes of Exercise per Session: 60 min   Stress: No Stress Concern Present    Feeling of Stress : Only a little   Social  Connections: Moderately Integrated    Frequency of Communication with Friends and Family: More than three times a week    Frequency of Social Gatherings with Friends and Family: More than three times a week    Attends Taoism Services: More than 4 times per year    Active Member of Clubs or Organizations: No    Attends Club or Organization Meetings: Never    Marital Status:    Housing Stability: Low Risk     Unable to Pay for Housing in the Last Year: No    Number of Places Lived in the Last Year: 1    Unstable Housing in the Last Year: No       History of present illness:  Mr. Swann comes in today for follow-up for his bilateral shoulder osteoarthritis.  He was last seen and injected in both shoulders about 5 months ago with relief that lasted about 2 months.  He has known glenohumeral arthrosis in his bilateral shoulders.  He comes in today requesting repeat injections.  He denies any new injury or trauma.    Review of Systems:    Constitutional: Negative for chills, fever and weight loss.   HENT: Negative for congestion.    Eyes: Negative for discharge and redness.   Respiratory: Negative for cough and shortness of breath.    Cardiovascular: Negative for chest pain.   Gastrointestinal: Negative for nausea and vomiting.   Musculoskeletal: See HPI.   Skin: Negative for rash.   Neurological: Negative for headaches.   Endo/Heme/Allergies: Does not bruise/bleed easily.   Psychiatric/Behavioral: The patient is not nervous/anxious.    All other systems reviewed and are negative.       Objective:      Physical Examination:    Vital Signs:    Vitals:    07/06/23 1026   BP: 106/70       Body mass index is 26.63 kg/m².    This a well-developed, well nourished patient in no acute distress.  They are alert and oriented and cooperative to examination.     Bilateral shoulder exam:  His bilateral shoulder is similar to where he was on previous visits with us. Skin is dry and intact, no erythema or ecchymosis, no signs  symptoms of infection.  Range of motion 170° of forward flexion, external rotation is only about 70° on the left, about 90° on the right.  Internal rotation is to lumbar sacral spine. Apolinar's test, no weakness with resisted testing bilaterally. Crepitus bilaterally.    Pertinent New Results:        XRAY Report / Interpretation:   No new radiographs were taken on today's clinic visit.      Assessment:       1. Primary osteoarthritis of right shoulder    2. Primary osteoarthritis of left shoulder      Plan:     Primary osteoarthritis of right shoulder  -     Large Joint Aspiration/Injection: R glenohumeral    Primary osteoarthritis of left shoulder  -     Large Joint Aspiration/Injection: L glenohumeral    Other orders  -     Cancel: X-Ray Knee 3 View Left        Follow up in about 2 months (around 9/6/2023) for Injec. f/up bilat shoulders 7/6/23, Tues/Thurs/, Surgery Consult, Tues/Thurs.    I injected his bilateral shoulders today via a direct anterior approach into the glenohumeral 40 mg of Kenalog and lidocaine respectively.  He tolerated this well.  I did discuss with him that the definitive treatment for his shoulders would be total shoulder arthroplasty.  If/when he proceeds in that direction is completely his decision.  We will see him back when he returns for a scheduled vacation, in 2 months to see how he responds to today's injection and further discuss total shoulder arthroplasty.        Andrews Conde, HUES, PA-C    This note was created using [employer] voice recognition software that occasionally misinterprets words or phrases.

## 2023-07-06 NOTE — PROCEDURES
Large Joint Aspiration/Injection: L glenohumeral    Date/Time: 7/6/2023 10:15 AM    Performed by: Andrews Conde PA-C  Authorized by: Andrews Conde PA-C    Consent Done?:  Yes (Verbal)  Indications:  Arthritis and pain  Site marked: the procedure site was marked    Timeout: prior to procedure the correct patient, procedure, and site was verified    Prep: patient was prepped and draped in usual sterile fashion      Local anesthesia used?: Yes    Local anesthetic:  Lidocaine 2% with epinephrine    Details:  Needle Size:  25 G  Ultrasonic Guidance for needle placement?: No    Location:  Shoulder  Site:  L glenohumeral  Medications:  40 mg triamcinolone acetonide 40 mg/mL  Patient tolerance:  Patient tolerated the procedure well with no immediate complications  Large Joint Aspiration/Injection: R glenohumeral    Date/Time: 7/6/2023 10:15 AM    Performed by: Andrews Conde PA-C  Authorized by: Andrews Conde PA-C    Consent Done?:  Yes (Verbal)  Indications:  Pain and arthritis  Site marked: the procedure site was marked    Timeout: prior to procedure the correct patient, procedure, and site was verified    Prep: patient was prepped and draped in usual sterile fashion      Local anesthesia used?: Yes    Local anesthetic:  Lidocaine 1% without epinephrine    Details:  Needle Size:  25 G  Ultrasonic Guidance for needle placement?: No    Location:  Shoulder  Site:  R glenohumeral  Medications:  40 mg triamcinolone acetonide 40 mg/mL  Patient tolerance:  Patient tolerated the procedure well with no immediate complications

## 2023-07-26 DIAGNOSIS — R39.9 LOWER URINARY TRACT SYMPTOMS (LUTS): ICD-10-CM

## 2023-07-26 DIAGNOSIS — N52.9 ERECTILE DYSFUNCTION: ICD-10-CM

## 2023-07-27 RX ORDER — TADALAFIL 5 MG/1
5 TABLET ORAL DAILY
Qty: 30 TABLET | Refills: 11 | Status: SHIPPED | OUTPATIENT
Start: 2023-07-27 | End: 2023-12-29 | Stop reason: SDUPTHER

## 2023-09-15 RX ORDER — TRIAMCINOLONE ACETONIDE 40 MG/ML
40 INJECTION, SUSPENSION INTRA-ARTICULAR; INTRAMUSCULAR
Status: SHIPPED | OUTPATIENT
Start: 2023-07-06

## 2023-10-25 ENCOUNTER — OFFICE VISIT (OUTPATIENT)
Dept: ORTHOPEDICS | Facility: CLINIC | Age: 73
End: 2023-10-25
Payer: MEDICARE

## 2023-10-25 VITALS — BODY MASS INDEX: 26.52 KG/M2 | HEIGHT: 66 IN | WEIGHT: 165 LBS

## 2023-10-25 DIAGNOSIS — M50.30 DDD (DEGENERATIVE DISC DISEASE), CERVICAL: Primary | ICD-10-CM

## 2023-10-25 DIAGNOSIS — M47.812 FACET ARTHRITIS, DEGENERATIVE, CERVICAL SPINE: ICD-10-CM

## 2023-10-25 PROCEDURE — 99213 OFFICE O/P EST LOW 20 MIN: CPT | Mod: S$GLB,,, | Performed by: PHYSICIAN ASSISTANT

## 2023-10-25 PROCEDURE — 99213 PR OFFICE/OUTPT VISIT, EST, LEVL III, 20-29 MIN: ICD-10-PCS | Mod: S$GLB,,, | Performed by: PHYSICIAN ASSISTANT

## 2023-10-25 NOTE — PROGRESS NOTES
[facility] ORTHOPEDICS  [address]  [place] [address]  Phone: [phone]   [phone]    Patient's PCP: Brendan Frank Jr., MD  Referring Provider: No ref. provider found    Subjective:      Chief Complaint:   Chief Complaint   Patient presents with    Neck - Pain     Cervical pain that has been going on for a few months but has been getting progressively worse over the last few weeks. Denies any numbness or tingling.       Past Medical History:   Diagnosis Date    Allergic rhinitis, seasonal     Gout attack     Hyperlipidemia     Prostate cancer     Prostate cancer 8/1/2019       Past Surgical History:   Procedure Laterality Date    BACK SURGERY  01/29/2016    CYSTOSCOPY N/A 12/1/2021    Procedure: CYSTOSCOPY;  Surgeon: Marek Guidry Jr., MD;  Location: [facility];  Service: Urology;  Laterality: N/A;    INGUINAL HERNIA REPAIR         Current Outpatient Medications   Medication Sig    allopurinoL (ZYLOPRIM) 100 MG tablet TAKE 2 TABLETS BY MOUTH EVERY DAY    aspirin 81 MG Chew Take 81 mg by mouth every other day.    cetirizine (ZYRTEC) 10 MG tablet Take 10 mg by mouth every evening.    colchicine (COLCRYS) 0.6 mg tablet TAKE 1 TABLET BY MOUTH EVERY DAY    ezetimibe (ZETIA) 10 mg tablet TAKE 1 TABLET BY MOUTH EVERY DAY    fluticasone (FLONASE) 50 mcg/actuation nasal spray 2 sprays (100 mcg total) by Each Nare route 2 (two) times daily.    fluticasone propionate (FLONASE) 50 mcg/actuation nasal spray 1 spray (50 mcg total) by Each Nostril route once daily.    guaiFENesin (MUCINEX) 600 mg 12 hr tablet Take 2 tablets (1,200 mg total) by mouth 2 (two) times daily.    guaiFENesin (MUCINEX) 600 mg 12 hr tablet Take 2 tablets (1,200 mg total) by mouth 2 (two) times daily.    levocetirizine (XYZAL) 5 MG tablet Take 1 tablet (5 mg total) by mouth every evening.    levocetirizine (XYZAL) 5 MG tablet Take 1 tablet (5 mg total) by mouth every evening.    omega-3 acid ethyl esters (LOVAZA) 1 gram capsule  TAKE 2 CAPSULES BY MOUTH TWICE A DAY    tadalafiL (CIALIS) 20 MG Tab Take 1 tablet (20 mg total) by mouth daily as needed (As directed).    tadalafiL (CIALIS) 5 MG tablet Take 1 tablet (5 mg total) by mouth once daily.    tamsulosin (FLOMAX) 0.4 mg Cap TAKE 1 CAPSULE BY MOUTH EVERY DAY    VYZULTA 0.024 % Drop Place 1 drop into both eyes once daily.     No current facility-administered medications for this visit.     Facility-Administered Medications Ordered in Other Visits   Medication    triamcinolone acetonide injection 40 mg       Review of patient's allergies indicates:   Allergen Reactions    Grass pollen- grass standard Other (See Comments)     Triggers his sinuses       Family History   Problem Relation Age of Onset    Hypertension Mother     Diabetes Mother     Hyperlipidemia Mother     Heart disease Mother     Glaucoma Mother     Glaucoma Father     Hyperlipidemia Father     Diabetes Father     Heart disease Sister     Diabetes Sister     Hypertension Sister     Heart disease Maternal Aunt     Heart disease Maternal Uncle        Social History     Socioeconomic History    Marital status:    Occupational History     Employer: [employer]   Tobacco Use    Smoking status: Former     Current packs/day: 0.00     Types: Cigarettes     Quit date: 2010     Years since quittin.8    Smokeless tobacco: Never   Substance and Sexual Activity    Alcohol use: Yes     Comment: soc    Drug use: Never    Sexual activity: Yes     Partners: Female     Social Determinants of Health     Financial Resource Strain: Low Risk  (2022)    Overall Financial Resource Strain (CARDIA)     Difficulty of Paying Living Expenses: Not hard at all   Food Insecurity: No Food Insecurity (2022)    Hunger Vital Sign     Worried About Running Out of Food in the Last Year: Never true     Ran Out of Food in the Last Year: Never true   Transportation Needs: No Transportation Needs (2022)    PRAPARE - Transportation     Lack of  Transportation (Medical): No     Lack of Transportation (Non-Medical): No   Physical Activity: Sufficiently Active (8/2/2022)    Exercise Vital Sign     Days of Exercise per Week: 5 days     Minutes of Exercise per Session: 60 min   Stress: No Stress Concern Present (8/2/2022)    [place] [employer] - Occupational Stress Questionnaire     Feeling of Stress : Only a little   Social Connections: Moderately Integrated (8/2/2022)    Social Connection and Isolation Panel [NHANES]     Frequency of Communication with Friends and Family: More than three times a week     Frequency of Social Gatherings with Friends and Family: More than three times a week     Attends Baptism Services: More than 4 times per year     Active Member of Clubs or Organizations: No     Attends Club or Organization Meetings: Never     Marital Status:    Housing Stability: Low Risk  (8/2/2022)    Housing Stability Vital Sign     Unable to Pay for Housing in the Last Year: No     Number of Places Lived in the Last Year: 1     Unstable Housing in the Last Year: No       Prior to meeting with the patient I reviewed the medical chart in [facility]. This included reviewing the previous progress notes from our office, review of the patient's last appointment with their primary care provider, review of any visits to the emergency room, and review of any pain management appointments or procedures.    History of present illness:  73-year-old male, presents to clinic today for his neck.  He complains of some generalized neck pain.  Insidious in onset, progressively worsening over the last 6 months but for the last couple weeks has been certainly more noticeable.  Pain is primarily in the back of the neck.  He gets some discomfort with range of motion.  After prolonged periods of sitting down watching television he will actually have to use a neck pillow were cushion to support his head and neck.  He denies radicular symptoms.      Review  of Systems:    Constitutional: Negative for chills, fever and weight loss.   HENT: Negative for congestion.    Eyes: Negative for discharge and redness.   Respiratory: Negative for cough and shortness of breath.    Cardiovascular: Negative for chest pain.   Gastrointestinal: Negative for nausea and vomiting.   Musculoskeletal: See HPI.   Skin: Negative for rash.   Neurological: Negative for headaches.   Endo/Heme/Allergies: Does not bruise/bleed easily.   Psychiatric/Behavioral: The patient is not nervous/anxious.    All other systems reviewed and are negative.       Objective:      Physical Examination:    Vital Signs:  There were no vitals filed for this visit.    Body mass index is 26.63 kg/m².    This a well-developed, well nourished patient in no acute distress.  They are alert and oriented and cooperative to examination.     Examination of the cervical spine, generalized paraspinous tenderness.  No spasm.  Range of motion is normal.  No significant limitations with flexion extension rotation or lateral bending.  No subjective numbness and tingling in the bilateral upper extremities.  Full use of the bilateral upper extremities.  Distally neurovascularly intact.    Pertinent New Results:        XRAY Report / Interpretation:   AP and lateral views of cervical spine taken today in the office demonstrate some very mild multilevel degenerative changes but he has advanced degenerative changes at C5-6 with complete loss of the disc space, anterior osteophytes of the inferior endplates of C5 and superior endplates of C6.  He is also developing some facet arthritis.          Assessment:       1. DDD (degenerative disc disease), cervical    2. Facet arthritis, degenerative, cervical spine      Plan:     DDD (degenerative disc disease), cervical  Comments:  C5-6  Orders:  -     X-Ray Cervical Spine AP And Lateral  -     Ambulatory referral/consult to Physical/Occupational Therapy; Future; Expected date:  11/01/2023    Facet arthritis, degenerative, cervical spine        Follow up in about 6 weeks (around 12/6/2023) for Cervical PT f/up.    Neck pain, findings of cervical disc degeneration at C5-6.  In the absence of radiculitis I think the patient's pain symptoms are either discogenic or facetogenic in nature.  We have ordered physical therapy.  We discussed use of a daily anti-inflammatory such as naproxen or ibuprofen.  He can combine this with Tylenol for additional pain relief.  He will follow-up with us in 6 weeks.  If he is not improved plan would be to do an MRI and possibly recommend pain management injections.      PAMELA Santamaria, PA-C    This note was created using [employer] voice recognition software that occasionally misinterprets words or phrases.

## 2023-10-27 DIAGNOSIS — E78.5 HYPERLIPIDEMIA: ICD-10-CM

## 2023-10-27 RX ORDER — EZETIMIBE 10 MG/1
TABLET ORAL
Qty: 90 TABLET | Refills: 0 | Status: SHIPPED | OUTPATIENT
Start: 2023-10-27 | End: 2024-01-25

## 2023-10-27 NOTE — TELEPHONE ENCOUNTER
Refill Decision Note   Shree Heredia  is requesting a refill authorization.  Brief Assessment and Rationale for Refill:  Approve     Medication Therapy Plan:         Comments:     Note composed:10:49 AM 10/27/2023             Appointments     Last Visit   3/13/2023 Brendan Frank Jr., MD   Next Visit   3/14/2024 Brendan Frank Jr., MD

## 2023-10-27 NOTE — TELEPHONE ENCOUNTER
No care due was identified.  [facility] Embedded Care Due Messages. Reference number: [id number].   10/27/2023 1:11:57 AM CDT

## 2023-11-22 DIAGNOSIS — M10.00 ACUTE IDIOPATHIC GOUT, UNSPECIFIED SITE: ICD-10-CM

## 2023-11-22 RX ORDER — COLCHICINE 0.6 MG/1
TABLET ORAL
Qty: 90 TABLET | Refills: 3 | Status: SHIPPED | OUTPATIENT
Start: 2023-11-22

## 2023-11-22 NOTE — TELEPHONE ENCOUNTER
No care due was identified.  [facility] Embedded Care Due Messages. Reference number: [id number].   11/22/2023 12:15:47 AM CST

## 2023-11-22 NOTE — TELEPHONE ENCOUNTER
Refill Routing Note   Medication(s) are not appropriate for processing by [facility] for the following reason(s):        Required labs outdated    [facility] action(s):  Defer               Appointments  past 12m or future 3m with PCP    Date Provider   Last Visit   3/13/2023 Brendan Frank Jr., MD   Next Visit   3/14/2024 Brendan Frank Jr., MD   ED visits in past 90 days: 0        Note composed:3:19 AM 11/22/2023

## 2023-12-19 ENCOUNTER — OFFICE VISIT (OUTPATIENT)
Dept: ORTHOPEDICS | Facility: CLINIC | Age: 73
End: 2023-12-19
Payer: MEDICARE

## 2023-12-19 VITALS — HEIGHT: 66 IN | WEIGHT: 165 LBS | BODY MASS INDEX: 26.52 KG/M2

## 2023-12-19 DIAGNOSIS — M19.011 PRIMARY OSTEOARTHRITIS OF RIGHT SHOULDER: Primary | ICD-10-CM

## 2023-12-19 DIAGNOSIS — M19.012 PRIMARY OSTEOARTHRITIS OF LEFT SHOULDER: ICD-10-CM

## 2023-12-19 PROCEDURE — 99213 PR OFFICE/OUTPT VISIT, EST, LEVL III, 20-29 MIN: ICD-10-PCS | Mod: 25,S$GLB,, | Performed by: ORTHOPAEDIC SURGERY

## 2023-12-19 PROCEDURE — 20610 DRAIN/INJ JOINT/BURSA W/O US: CPT | Mod: 50,S$GLB,, | Performed by: ORTHOPAEDIC SURGERY

## 2023-12-19 PROCEDURE — 99213 OFFICE O/P EST LOW 20 MIN: CPT | Mod: 25,S$GLB,, | Performed by: ORTHOPAEDIC SURGERY

## 2023-12-19 PROCEDURE — 20610 LARGE JOINT ASPIRATION/INJECTION: R GLENOHUMERAL: ICD-10-PCS | Mod: 50,S$GLB,, | Performed by: ORTHOPAEDIC SURGERY

## 2023-12-19 RX ORDER — TRIAMCINOLONE ACETONIDE 40 MG/ML
40 INJECTION, SUSPENSION INTRA-ARTICULAR; INTRAMUSCULAR
Status: DISCONTINUED | OUTPATIENT
Start: 2023-12-19 | End: 2023-12-19 | Stop reason: HOSPADM

## 2023-12-19 RX ADMIN — TRIAMCINOLONE ACETONIDE 40 MG: 40 INJECTION, SUSPENSION INTRA-ARTICULAR; INTRAMUSCULAR at 09:12

## 2023-12-19 NOTE — PROGRESS NOTES
[facility]    Subjective:     Chief Complaint:   Chief Complaint   Patient presents with    Right Shoulder - Pain     Patient is here for a f/up on bilat. Shoulder injections 7/6/23, states his pain is worse than his last visit, ROM is painful &limited, unable to sleep.    Left Shoulder - Pain       Past Medical History:   Diagnosis Date    Allergic rhinitis, seasonal     Gout attack     Hyperlipidemia     Prostate cancer     Prostate cancer 8/1/2019       Past Surgical History:   Procedure Laterality Date    BACK SURGERY  01/29/2016    CYSTOSCOPY N/A 12/1/2021    Procedure: CYSTOSCOPY;  Surgeon: Marek Guidry Jr., MD;  Location: [facility];  Service: Urology;  Laterality: N/A;    INGUINAL HERNIA REPAIR         Current Outpatient Medications   Medication Sig    allopurinoL (ZYLOPRIM) 100 MG tablet TAKE 2 TABLETS BY MOUTH EVERY DAY    aspirin 81 MG Chew Take 81 mg by mouth every other day.    cetirizine (ZYRTEC) 10 MG tablet Take 10 mg by mouth every evening.    colchicine (COLCRYS) 0.6 mg tablet TAKE 1 TABLET BY MOUTH EVERY DAY    ezetimibe (ZETIA) 10 mg tablet TAKE 1 TABLET BY MOUTH EVERY DAY    fluticasone (FLONASE) 50 mcg/actuation nasal spray 2 sprays (100 mcg total) by Each Nare route 2 (two) times daily.    fluticasone propionate (FLONASE) 50 mcg/actuation nasal spray 1 spray (50 mcg total) by Each Nostril route once daily.    levocetirizine (XYZAL) 5 MG tablet Take 1 tablet (5 mg total) by mouth every evening.    omega-3 acid ethyl esters (LOVAZA) 1 gram capsule TAKE 2 CAPSULES BY MOUTH TWICE A DAY    tadalafiL (CIALIS) 20 MG Tab Take 1 tablet (20 mg total) by mouth daily as needed (As directed).    tadalafiL (CIALIS) 5 MG tablet Take 1 tablet (5 mg total) by mouth once daily.    tamsulosin (FLOMAX) 0.4 mg Cap TAKE 1 CAPSULE BY MOUTH EVERY DAY    VYZULTA 0.024 % Drop Place 1 drop into both eyes once daily.    guaiFENesin (MUCINEX) 600 mg 12 hr tablet Take 2 tablets (1,200 mg total) by mouth 2 (two)  times daily. (Patient not taking: Reported on 2023)    guaiFENesin (MUCINEX) 600 mg 12 hr tablet Take 2 tablets (1,200 mg total) by mouth 2 (two) times daily. (Patient not taking: Reported on 2023)    levocetirizine (XYZAL) 5 MG tablet Take 1 tablet (5 mg total) by mouth every evening. (Patient not taking: Reported on 2023)     No current facility-administered medications for this visit.     Facility-Administered Medications Ordered in Other Visits   Medication    triamcinolone acetonide injection 40 mg       Review of patient's allergies indicates:   Allergen Reactions    Grass pollen- grass standard Other (See Comments)     Triggers his sinuses    Permethrin        Family History   Problem Relation Age of Onset    Hypertension Mother     Diabetes Mother     Hyperlipidemia Mother     Heart disease Mother     Glaucoma Mother     Glaucoma Father     Hyperlipidemia Father     Diabetes Father     Heart disease Sister     Diabetes Sister     Hypertension Sister     Heart disease Maternal Aunt     Heart disease Maternal Uncle        Social History     Socioeconomic History    Marital status:    Occupational History     Employer: [employer]   Tobacco Use    Smoking status: Former     Current packs/day: 0.00     Types: Cigarettes     Quit date: 2010     Years since quittin.9    Smokeless tobacco: Never   Substance and Sexual Activity    Alcohol use: Yes     Comment: soc    Drug use: Never    Sexual activity: Yes     Partners: Female     Social Determinants of Health     Financial Resource Strain: Low Risk  (2022)    Overall Financial Resource Strain (CARDIA)     Difficulty of Paying Living Expenses: Not hard at all   Food Insecurity: No Food Insecurity (2022)    Hunger Vital Sign     Worried About Running Out of Food in the Last Year: Never true     Ran Out of Food in the Last Year: Never true   Transportation Needs: No Transportation Needs (2022)    PRAPARE - Transportation      Lack of Transportation (Medical): No     Lack of Transportation (Non-Medical): No   Physical Activity: Sufficiently Active (8/2/2022)    Exercise Vital Sign     Days of Exercise per Week: 5 days     Minutes of Exercise per Session: 60 min   Stress: No Stress Concern Present (8/2/2022)    Citizen of [place] of Occupational Health - Occupational Stress Questionnaire     Feeling of Stress : Only a little   Social Connections: Moderately Integrated (8/2/2022)    Social Connection and Isolation Panel [NHANES]     Frequency of Communication with Friends and Family: More than three times a week     Frequency of Social Gatherings with Friends and Family: More than three times a week     Attends Hoahaoism Services: More than 4 times per year     Active Member of Clubs or Organizations: No     Attends Club or Organization Meetings: Never     Marital Status:    Housing Stability: Low Risk  (8/2/2022)    Housing Stability Vital Sign     Unable to Pay for Housing in the Last Year: No     Number of Places Lived in the Last Year: 1     Unstable Housing in the Last Year: No       History of present illness:  Mr. Swann comes in today for follow-up for his bilateral shoulder osteoarthritis.  He was last seen and injected in both shoulders about 5 months ago with relief.  He has known glenohumeral arthrosis in his bilateral shoulders.  He comes in today requesting repeat injections.  He denies any new injury or trauma.     Review of Systems:    Constitution: Negative for chills, fever, and sweats.  Negative for unexplained weight loss.    HENT:  Negative for headaches and blurry vision.    Cardiovascular:Negative for chest pain or irregular heart beat. Negative for hypertension.    Respiratory:  Negative for cough and shortness of breath.    Gastrointestinal: Negative for abdominal pain, heartburn, melena, nausea, and vomitting.    Genitourinary:  Negative bladder incontinence and dysuria.    Musculoskeletal:  See HPI for  "details.     Neurological: Negative for numbness.    Psychiatric/Behavioral: Negative for depression.  The patient is not nervous/anxious.      Endocrine: Negative for polyuria    Hematologic/Lymphatic: Negative for bleeding problem.  Does not bruise/bleed easily.    Skin: Negative for poor would healing and rash    Objective:      Physical Examination:    Vital Signs:  There were no vitals filed for this visit.    Body mass index is 26.63 kg/m².    This a well-developed, well nourished patient in no acute distress.  They are alert and oriented and cooperative to examination.        Bilateral shoulder exam: His bilateral shoulder is similar to where he was on previous visits with us. Skin is dry and intact, no erythema or ecchymosis, no signs symptoms of infection.  Range of motion 170° of forward flexion, external rotation is only about 70° on the left, about 90° on the right.  Internal rotation is to lumbar sacral spine. Apolinar's test, no weakness with resisted testing bilaterally. Crepitus bilaterally.     Pertinent New Results:    XRAY Report / Interpretation:   No new radiographs taken on today's clinic visit.    Assessment/Plan:      1. Bilateral shoulder glenohumeral osteoarthritis, severe.      I injected his bilateral shoulders today via a direct anterior approach into the glenohumeral 40 mg of Kenalog and lidocaine respectively.  He tolerated this well.  I did discuss with him that the definitive treatment for his shoulders would be total shoulder arthroplasty.  If/when he proceeds in that direction is completely his decision.  We will see him back in 3 months to see how he responds to today's injection and further discuss total shoulder arthroplasty.     Andrews Conde, Physician Assistant, served in the capacity as a "scribe" for this patient encounter.  A "face-to-face" encounter occurred with Dr. Germán Carty on this date.  The treatment plan and medical decision-making is outlined above. Patient " was seen and examined with a chaperone.       This note was created using Dragon voice recognition software that occasionally misinterpreted phrases or words.

## 2023-12-19 NOTE — PROCEDURES
Large Joint Aspiration/Injection: R glenohumeral    Date/Time: 12/19/2023 9:00 AM    Performed by: Germán Carty MD  Authorized by: Germán Carty MD    Consent Done?:  Yes (Verbal)  Indications:  Arthritis and pain  Site marked: the procedure site was marked    Timeout: prior to procedure the correct patient, procedure, and site was verified    Prep: patient was prepped and draped in usual sterile fashion      Local anesthesia used?: Yes    Local anesthetic:  Lidocaine 1% without epinephrine    Details:  Needle Size:  25 G  Ultrasonic Guidance for needle placement?: No    Location:  Shoulder  Site:  R glenohumeral  Medications:  40 mg triamcinolone acetonide 40 mg/mL  Patient tolerance:  Patient tolerated the procedure well with no immediate complications  Large Joint Aspiration/Injection: L glenohumeral    Date/Time: 12/19/2023 9:00 AM    Performed by: Germán Carty MD  Authorized by: Germán Carty MD    Consent Done?:  Yes (Verbal)  Indications:  Arthritis and pain  Site marked: the procedure site was marked    Timeout: prior to procedure the correct patient, procedure, and site was verified    Prep: patient was prepped and draped in usual sterile fashion      Local anesthesia used?: Yes    Local anesthetic:  Lidocaine 1% without epinephrine    Details:  Needle Size:  25 G  Ultrasonic Guidance for needle placement?: No    Location:  Shoulder  Site:  L glenohumeral  Medications:  40 mg triamcinolone acetonide 40 mg/mL  Patient tolerance:  Patient tolerated the procedure well with no immediate complications

## 2023-12-21 ENCOUNTER — TELEPHONE (OUTPATIENT)
Dept: ORTHOPEDICS | Facility: CLINIC | Age: 73
End: 2023-12-21

## 2023-12-21 NOTE — TELEPHONE ENCOUNTER
----- Message from Mora Lauren sent at 12/20/2023 10:18 AM CST -----  PT WOULD  LIKE A CALL  BACK HE  WAS  HERE  ON YESTERDAY ON 12/19/23 AND HAS DECIDED TO GO FORWARD WITH THE SURGERY.

## 2023-12-29 ENCOUNTER — OFFICE VISIT (OUTPATIENT)
Dept: UROLOGY | Facility: CLINIC | Age: 73
End: 2023-12-29
Payer: MEDICARE

## 2023-12-29 ENCOUNTER — LAB VISIT (OUTPATIENT)
Dept: LAB | Facility: HOSPITAL | Age: 73
End: 2023-12-29
Attending: UROLOGY
Payer: MEDICARE

## 2023-12-29 VITALS — WEIGHT: 165 LBS | RESPIRATION RATE: 18 BRPM | HEIGHT: 66 IN | BODY MASS INDEX: 26.52 KG/M2

## 2023-12-29 DIAGNOSIS — N52.8 OTHER MALE ERECTILE DYSFUNCTION: ICD-10-CM

## 2023-12-29 DIAGNOSIS — R39.9 LOWER URINARY TRACT SYMPTOMS (LUTS): ICD-10-CM

## 2023-12-29 DIAGNOSIS — C61 PROSTATE CANCER: Primary | ICD-10-CM

## 2023-12-29 DIAGNOSIS — C61 PROSTATE CANCER: ICD-10-CM

## 2023-12-29 LAB — COMPLEXED PSA SERPL-MCNC: 0.1 NG/ML (ref 0–4)

## 2023-12-29 PROCEDURE — 99214 OFFICE O/P EST MOD 30 MIN: CPT | Mod: S$PBB,,, | Performed by: UROLOGY

## 2023-12-29 PROCEDURE — 99999 PR PBB SHADOW E&M-EST. PATIENT-LVL III: CPT | Mod: PBBFAC,,, | Performed by: UROLOGY

## 2023-12-29 PROCEDURE — 99999 PR PBB SHADOW E&M-EST. PATIENT-LVL III: ICD-10-PCS | Mod: PBBFAC,,, | Performed by: UROLOGY

## 2023-12-29 PROCEDURE — 36415 COLL VENOUS BLD VENIPUNCTURE: CPT | Performed by: UROLOGY

## 2023-12-29 PROCEDURE — 99214 PR OFFICE/OUTPT VISIT, EST, LEVL IV, 30-39 MIN: ICD-10-PCS | Mod: S$PBB,,, | Performed by: UROLOGY

## 2023-12-29 PROCEDURE — 84153 ASSAY OF PSA TOTAL: CPT | Performed by: UROLOGY

## 2023-12-29 PROCEDURE — 99213 OFFICE O/P EST LOW 20 MIN: CPT | Mod: PBBFAC,PO | Performed by: UROLOGY

## 2023-12-29 RX ORDER — TADALAFIL 20 MG/1
20 TABLET ORAL DAILY PRN
Qty: 15 TABLET | Refills: 11 | Status: SHIPPED | OUTPATIENT
Start: 2023-12-29 | End: 2024-03-27 | Stop reason: CLARIF

## 2023-12-29 RX ORDER — TAMSULOSIN HYDROCHLORIDE 0.4 MG/1
1 CAPSULE ORAL DAILY
Qty: 90 CAPSULE | Refills: 3 | Status: SHIPPED | OUTPATIENT
Start: 2023-12-29

## 2023-12-29 RX ORDER — TADALAFIL 5 MG/1
5 TABLET ORAL DAILY
Qty: 30 TABLET | Refills: 11 | Status: SHIPPED | OUTPATIENT
Start: 2023-12-29 | End: 2024-03-14 | Stop reason: SDUPTHER

## 2023-12-29 NOTE — PROGRESS NOTES
[facility] Established Patient/H&P:    Shree Heredia is a 73 y.o. male who presents for follow up for prostate cancer and gross hematuria.     Patient with a history of Kylee 3+3=6 prostate cancer diagnosed in 5/2018 with Dr. Hicks. He is now s/p brachytherapy in 9/2018 with Dr. Dobbs in [place].      He underwent initial bone scan which was negative. CT abdomen pelvis with a 2 mm right lung nodule. Stable on follow up CT chest.      He reports moderate lower urinary tract symptoms and erectile dysfunction after brachytherapy. He is managed with Flomax 0.4 mg and Cialis 5 mg for his symptoms.      On review of records, he last saw Dr. Hicks in 5/2020 for gross hematuria. CT revealed a left kidney stone. Urine cytology with atypical cells. Cystoscopy never performed. Hematuria has resolved.      Retired [employer]        Interval History     8/29/22: He is now s/p cystoscopy on 12/1/21 which revealed a friable, hypervascular, mildly obstructing prostate. Repeat urine cytology negative.      He remains on Flomax 0.4 mg  and Cialis 5 mg PO daily. He only reports mild incomplete emptying and nocturia x 2. Urine dipstick negative today. No recurrent gross hematuria.     4/3/23: Here today for follow up. No recent episodes of gross hematuria. He remains on Flomax 0.4 mg and Cialis 5 mg PO daily for LUTS and ED.      States that he has had some difficulty with maintaining an erection that has been worsening. Interested in Cialis as needed also. Of note, he reports bilateral scrotal cysts. States it was evaluated several years ago and he was told it was benign. Reports that he has noticed some growth of the cysts in his scrotum. Also has had moderate pain after intercourse.     12/29/23: US scrotum on 4/6/23 with bilateral atrophied testicles, 3.6 cm left epididymal cyst and a small left hydrocele. Here today for follow up. Remains on Cialis 5 mg PO daily for his LUTS and ED. Using Cialis  "20 mg as needed.      Denies any fever, chills, flank pain, bone pain, unintentional weight loss or  trauma.         PSA  0.15                 8/29/22  0.25                 9/10/21  0.43                 8/6/20  5.3                   3/2/18  8.4                   2/1/18  1.1                   5/5/08     Testosterone  244                  10/10/13     Urine culture  No growth        5/12/20     IPSS    QoL  3          0          8/29/22    Past Medical History:   Diagnosis Date    Allergic rhinitis, seasonal     Gout attack     Hyperlipidemia     Prostate cancer     Prostate cancer 8/1/2019       Past Surgical History:   Procedure Laterality Date    BACK SURGERY  01/29/2016    CYSTOSCOPY N/A 12/1/2021    Procedure: CYSTOSCOPY;  Surgeon: Marek Guidry Jr., MD;  Location: [facility];  Service: Urology;  Laterality: N/A;    INGUINAL HERNIA REPAIR         Review of patient's allergies indicates:   Allergen Reactions    Grass pollen-june grass standard Other (See Comments)     Triggers his sinuses    Permethrin        Medications Reviewed: see MAR    FOCUSED PHYSICAL EXAM:    Vitals:    12/29/23 1347   Resp: 18     Body mass index is 26.63 kg/m². Weight: 74.8 kg (165 lb) Height: 5' 6" (167.6 cm)       General: Alert, cooperative, no distress, appears stated age  Abdomen: Soft, non-tender, no CVA tenderness, non-distended    LABS:    No results found for this or any previous visit (from the past 336 hour(s)).        Assessment/Diagnosis:    1. Prostate cancer  PROSTATE SPECIFIC ANTIGEN, DIAGNOSTIC      2. Lower urinary tract symptoms (LUTS)  tadalafiL (CIALIS) 5 MG tablet      3. Erectile dysfunction  tadalafiL (CIALIS) 5 MG tablet      4. Other male erectile dysfunction  tadalafiL (CIALIS) 20 MG Tab          Plans:    - I spent 30 minutes of the day of this encounter preparing for, treating and managing this patient. Reviewed the NCCN guidelines with patient regarding surveillance after XRT which he completed in 9/2018. We " discussed that he will require PSA every 6 months for approximately 5 years. Last PSA 0.15.  - PSA today.    - Continue Cialis 5 mg PO daily for LUTS and ED. Refills ordered.   - Continue Cialis 20 mg as needed. Refills ordered.   - RTC in 1 year with PSA.

## 2024-01-09 ENCOUNTER — OFFICE VISIT (OUTPATIENT)
Dept: ORTHOPEDICS | Facility: CLINIC | Age: 74
End: 2024-01-09
Payer: MEDICARE

## 2024-01-09 VITALS — BODY MASS INDEX: 26.52 KG/M2 | HEIGHT: 66 IN | WEIGHT: 165 LBS

## 2024-01-09 DIAGNOSIS — M19.011 PRIMARY OSTEOARTHRITIS OF RIGHT SHOULDER: ICD-10-CM

## 2024-01-09 DIAGNOSIS — M19.012 PRIMARY OSTEOARTHRITIS OF LEFT SHOULDER: Primary | ICD-10-CM

## 2024-01-09 PROCEDURE — 99213 OFFICE O/P EST LOW 20 MIN: CPT | Mod: S$GLB,,, | Performed by: ORTHOPAEDIC SURGERY

## 2024-01-09 NOTE — PROGRESS NOTES
[facility]    Subjective:     Chief Complaint:   Chief Complaint   Patient presents with    Right Shoulder - Pain     Patient is here for a f/up on bilateral shoulder pain, injected 12.19.23, states his pain is much better than his last visit he has decided he would like to discuss replacement surgery of the Left Shoulder first.     Left Shoulder - Pain       Past Medical History:   Diagnosis Date    Allergic rhinitis, seasonal     Gout attack     Hyperlipidemia     Prostate cancer     Prostate cancer 8/1/2019       Past Surgical History:   Procedure Laterality Date    BACK SURGERY  01/29/2016    CYSTOSCOPY N/A 12/1/2021    Procedure: CYSTOSCOPY;  Surgeon: Marek Guidry Jr., MD;  Location: [facility];  Service: Urology;  Laterality: N/A;    INGUINAL HERNIA REPAIR         Current Outpatient Medications   Medication Sig    allopurinoL (ZYLOPRIM) 100 MG tablet TAKE 2 TABLETS BY MOUTH EVERY DAY    aspirin 81 MG Chew Take 81 mg by mouth every other day.    colchicine (COLCRYS) 0.6 mg tablet TAKE 1 TABLET BY MOUTH EVERY DAY    ezetimibe (ZETIA) 10 mg tablet TAKE 1 TABLET BY MOUTH EVERY DAY    omega-3 acid ethyl esters (LOVAZA) 1 gram capsule TAKE 2 CAPSULES BY MOUTH TWICE A DAY    tadalafiL (CIALIS) 5 MG tablet Take 1 tablet (5 mg total) by mouth once daily.    tamsulosin (FLOMAX) 0.4 mg Cap Take 1 capsule (0.4 mg total) by mouth once daily.    VYZULTA 0.024 % Drop Place 1 drop into both eyes once daily.    cetirizine (ZYRTEC) 10 MG tablet Take 10 mg by mouth every evening.    fluticasone (FLONASE) 50 mcg/actuation nasal spray 2 sprays (100 mcg total) by Each Nare route 2 (two) times daily. (Patient not taking: Reported on 1/9/2024)    fluticasone propionate (FLONASE) 50 mcg/actuation nasal spray 1 spray (50 mcg total) by Each Nostril route once daily. (Patient not taking: Reported on 1/9/2024)    guaiFENesin (MUCINEX) 600 mg 12 hr tablet Take 2 tablets (1,200 mg total) by mouth 2 (two) times daily. (Patient not  taking: Reported on 2024)    guaiFENesin (MUCINEX) 600 mg 12 hr tablet Take 2 tablets (1,200 mg total) by mouth 2 (two) times daily. (Patient not taking: Reported on 2024)    levocetirizine (XYZAL) 5 MG tablet Take 1 tablet (5 mg total) by mouth every evening. (Patient not taking: Reported on 2024)    levocetirizine (XYZAL) 5 MG tablet Take 1 tablet (5 mg total) by mouth every evening. (Patient not taking: Reported on 2024)    tadalafiL (CIALIS) 20 MG Tab Take 1 tablet (20 mg total) by mouth daily as needed (As directed). (Patient not taking: Reported on 2024)     No current facility-administered medications for this visit.     Facility-Administered Medications Ordered in Other Visits   Medication    triamcinolone acetonide injection 40 mg       Review of patient's allergies indicates:   Allergen Reactions    Grass pollen- grass standard Other (See Comments)     Triggers his sinuses    Permethrin        Family History   Problem Relation Age of Onset    Hypertension Mother     Diabetes Mother     Hyperlipidemia Mother     Heart disease Mother     Glaucoma Mother     Glaucoma Father     Hyperlipidemia Father     Diabetes Father     Heart disease Sister     Diabetes Sister     Hypertension Sister     Heart disease Maternal Aunt     Heart disease Maternal Uncle        Social History     Socioeconomic History    Marital status:    Occupational History     Employer: [employer]   Tobacco Use    Smoking status: Former     Current packs/day: 0.00     Types: Cigarettes     Quit date: 2010     Years since quittin.0    Smokeless tobacco: Never   Substance and Sexual Activity    Alcohol use: Yes     Comment: soc    Drug use: Never    Sexual activity: Yes     Partners: Female     Social Determinants of Health     Financial Resource Strain: Low Risk  (2022)    Overall Financial Resource Strain (CARDIA)     Difficulty of Paying Living Expenses: Not hard at all   Food Insecurity: No Food  Insecurity (8/2/2022)    Hunger Vital Sign     Worried About Running Out of Food in the Last Year: Never true     Ran Out of Food in the Last Year: Never true   Transportation Needs: No Transportation Needs (8/2/2022)    PRAPARE - Transportation     Lack of Transportation (Medical): No     Lack of Transportation (Non-Medical): No   Physical Activity: Sufficiently Active (8/2/2022)    Exercise Vital Sign     Days of Exercise per Week: 5 days     Minutes of Exercise per Session: 60 min   Stress: No Stress Concern Present (8/2/2022)    [place] of Occupational Health - Occupational Stress Questionnaire     Feeling of Stress : Only a little   Social Connections: Moderately Integrated (8/2/2022)    Social Connection and Isolation Panel [NHANES]     Frequency of Communication with Friends and Family: More than three times a week     Frequency of Social Gatherings with Friends and Family: More than three times a week     Attends Religion Services: More than 4 times per year     Active Member of Clubs or Organizations: No     Attends Club or Organization Meetings: Never     Marital Status:    Housing Stability: Low Risk  (8/2/2022)    Housing Stability Vital Sign     Unable to Pay for Housing in the Last Year: No     Number of Places Lived in the Last Year: 1     Unstable Housing in the Last Year: No       History of present illness:  Patient returns today for his bilateral shoulders.  His left shoulder is really bothering him and he wants to schedule a left total shoulder.  He has had multiple rounds of injections which have all been helpful but they are short-lived      Review of Systems:    Constitution: Negative for chills, fever, and sweats.  Negative for unexplained weight loss.    HENT:  Negative for headaches and blurry vision.    Cardiovascular:Negative for chest pain or irregular heart beat. Negative for hypertension.    Respiratory:  Negative for cough and shortness of  breath.    Gastrointestinal: Negative for abdominal pain, heartburn, melena, nausea, and vomitting.    Genitourinary:  Negative bladder incontinence and dysuria.    Musculoskeletal:  See HPI for details.     Neurological: Negative for numbness.    Psychiatric/Behavioral: Negative for depression.  The patient is not nervous/anxious.      Endocrine: Negative for polyuria    Hematologic/Lymphatic: Negative for bleeding problem.  Does not bruise/bleed easily.    Skin: Negative for poor would healing and rash    Objective:      Physical Examination:    Vital Signs:  There were no vitals filed for this visit.    Body mass index is 26.63 kg/m².    This a well-developed, well nourished patient in no acute distress.  They are alert and oriented and cooperative to examination.        Patient appears to be stated age.  He is full passive range of motion.  Actively can flex to 160° he can abduct to 150° external rotation is 45°  Pertinent New Results:    XRAY Report / Interpretation:   AP and lateral of the left shoulder demonstrates bone-on-bone arthrosis of left shoulder    Assessment/Plan:      Advanced arthrosis of the left shoulder I have offered him a left total shoulder.  The risks and benefits discussed in great detail.  He understands.  He wishes to proceed.      This note was created using Dragon voice recognition software that occasionally misinterpreted phrases or words.

## 2024-01-19 DIAGNOSIS — E78.5 HYPERLIPIDEMIA: ICD-10-CM

## 2024-01-19 RX ORDER — OMEGA-3-ACID ETHYL ESTERS 1 G/1
CAPSULE, LIQUID FILLED ORAL
Qty: 360 CAPSULE | Refills: 3 | Status: SHIPPED | OUTPATIENT
Start: 2024-01-19

## 2024-01-19 NOTE — TELEPHONE ENCOUNTER
Refill Routing Note   Medication(s) are not appropriate for processing by [facility] for the following reason(s):        Required labs outdated  Responsible provider unclear    [facility] action(s):  Defer     Requires labs : Yes             Appointments  past 12m or future 3m with PCP    Date Provider   Last Visit   3/13/2023 Brendan Frank Jr., MD   Next Visit   3/14/2024 Brendan Frank Jr., MD   ED visits in past 90 days: 0        Note composed:11:34 AM 01/19/2024

## 2024-01-19 NOTE — TELEPHONE ENCOUNTER
Care Due:                  Date            Visit Type   Department     Provider  --------------------------------------------------------------------------------                                EP -                              PRIMARY      [facility]  Last Visit: 03-      CARE ([facility])   PRACTICE       Brendan Frank                              EP -                              PRIMARY      [facility]  Next Visit: 03-      CARE ([facility])   PRACTICE       Brendan Frank                                                            Last  Test          Frequency    Reason                     Performed    Due Date  --------------------------------------------------------------------------------    CBC.........  12 months..  allopurinoL..............  11- 11-    CMP.........  12 months..  allopurinoL, colchicine,   11- 11-                             ezetimibe................    Lipid Panel.  12 months..  ezetimibe................  11- 11-    Uric Acid...  12 months..  allopurinoL, colchicine..  11- 11-    [facility] Embedded Care Due Messages. Reference number: [id number].   1/19/2024 12:14:23 AM CST

## 2024-01-25 DIAGNOSIS — E78.5 HYPERLIPIDEMIA: ICD-10-CM

## 2024-01-25 RX ORDER — EZETIMIBE 10 MG/1
TABLET ORAL
Qty: 90 TABLET | Refills: 3 | Status: SHIPPED | OUTPATIENT
Start: 2024-01-25

## 2024-01-25 NOTE — TELEPHONE ENCOUNTER
Refill Routing Note   Medication(s) are not appropriate for processing by [facility] for the following reason(s):        Required labs outdated    [facility] action(s):  Defer               Appointments  past 12m or future 3m with PCP    Date Provider   Last Visit   3/13/2023 Brendan Frank Jr., MD   Next Visit   3/14/2024 Brendan Frank Jr., MD   ED visits in past 90 days: 0        Note composed:9:04 AM 01/25/2024

## 2024-02-06 DIAGNOSIS — Z01.818 PRE-OP TESTING: ICD-10-CM

## 2024-02-06 DIAGNOSIS — M19.012 PRIMARY OSTEOARTHRITIS OF LEFT SHOULDER: Primary | ICD-10-CM

## 2024-02-06 RX ORDER — TRANEXAMIC ACID 10 MG/ML
1000 INJECTION, SOLUTION INTRAVENOUS
Status: CANCELLED | OUTPATIENT
Start: 2024-02-06

## 2024-02-06 RX ORDER — CEFAZOLIN SODIUM 2 G/50ML
2 SOLUTION INTRAVENOUS
Status: CANCELLED | OUTPATIENT
Start: 2024-02-06

## 2024-02-19 DIAGNOSIS — E79.0 HYPERURICEMIA: ICD-10-CM

## 2024-02-19 RX ORDER — ALLOPURINOL 100 MG/1
TABLET ORAL
Qty: 180 TABLET | Refills: 3 | Status: SHIPPED | OUTPATIENT
Start: 2024-02-19

## 2024-02-19 NOTE — TELEPHONE ENCOUNTER
No care due was identified.  [facility] Embedded Care Due Messages. Reference number: [id number].   2/19/2024 12:13:48 AM CST

## 2024-02-19 NOTE — TELEPHONE ENCOUNTER
Refill Routing Note   Medication(s) are not appropriate for processing by [facility] for the following reason(s):        Required labs outdated    [facility] action(s):  Defer        Medication Therapy Plan: FOVS in 3 weeks      Appointments  past 12m or future 3m with PCP    Date Provider   Last Visit   3/13/2023 Brendan Frank Jr., MD   Next Visit   3/14/2024 Brendan Frank Jr., MD   ED visits in past 90 days: 0        Note composed:6:56 AM 02/19/2024

## 2024-02-27 DIAGNOSIS — Z00.00 ENCOUNTER FOR MEDICARE ANNUAL WELLNESS EXAM: ICD-10-CM

## 2024-03-14 ENCOUNTER — PATIENT OUTREACH (OUTPATIENT)
Dept: ADMINISTRATIVE | Facility: HOSPITAL | Age: 74
End: 2024-03-14
Payer: MEDICARE

## 2024-03-14 ENCOUNTER — OFFICE VISIT (OUTPATIENT)
Dept: FAMILY MEDICINE | Facility: CLINIC | Age: 74
End: 2024-03-14
Payer: MEDICARE

## 2024-03-14 VITALS
SYSTOLIC BLOOD PRESSURE: 114 MMHG | WEIGHT: 167.44 LBS | HEIGHT: 66 IN | HEART RATE: 64 BPM | BODY MASS INDEX: 26.91 KG/M2 | TEMPERATURE: 98 F | DIASTOLIC BLOOD PRESSURE: 66 MMHG | OXYGEN SATURATION: 98 %

## 2024-03-14 DIAGNOSIS — Z01.818 PRE-OP EVALUATION: ICD-10-CM

## 2024-03-14 DIAGNOSIS — E78.2 MIXED HYPERLIPIDEMIA: Primary | ICD-10-CM

## 2024-03-14 DIAGNOSIS — C61 PROSTATE CANCER: ICD-10-CM

## 2024-03-14 DIAGNOSIS — G72.9 MYOPATHY, UNSPECIFIED: ICD-10-CM

## 2024-03-14 DIAGNOSIS — Z01.810 ENCOUNTER FOR PREPROCEDURAL CARDIOVASCULAR EXAMINATION: ICD-10-CM

## 2024-03-14 DIAGNOSIS — E79.0 HYPERURICEMIA: ICD-10-CM

## 2024-03-14 DIAGNOSIS — Z78.9 STATIN INTOLERANCE: ICD-10-CM

## 2024-03-14 DIAGNOSIS — I70.0 ABDOMINAL AORTIC ATHEROSCLEROSIS: ICD-10-CM

## 2024-03-14 LAB
OHS QRS DURATION: 68 MS
OHS QTC CALCULATION: 388 MS

## 2024-03-14 PROCEDURE — 99999 PR PBB SHADOW E&M-EST. PATIENT-LVL III: CPT | Mod: PBBFAC,,, | Performed by: FAMILY MEDICINE

## 2024-03-14 PROCEDURE — 93010 ELECTROCARDIOGRAM REPORT: CPT | Mod: S$PBB,,, | Performed by: INTERNAL MEDICINE

## 2024-03-14 PROCEDURE — 93005 ELECTROCARDIOGRAM TRACING: CPT | Mod: PBBFAC,PN | Performed by: INTERNAL MEDICINE

## 2024-03-14 PROCEDURE — 99213 OFFICE O/P EST LOW 20 MIN: CPT | Mod: PBBFAC,25,PN | Performed by: FAMILY MEDICINE

## 2024-03-14 PROCEDURE — 99214 OFFICE O/P EST MOD 30 MIN: CPT | Mod: S$PBB,,, | Performed by: FAMILY MEDICINE

## 2024-03-14 NOTE — PROGRESS NOTES
Population Health Chart Review & Patient Outreach Details      Additional [facility] Notes:    The patient is showing as non-compliant on the Statin Therapy Measure     MSG SENT TO PCP to review        Statin intolerance noted in documentation and/or problem list.      Exclusions: Intolerance needs a 2024 code (myalgia, myositis, myopathy, or rhabdomyolysis) during the current measurement year AND problem list updated and reviewed.   G72.0  Drug-induced myopathy or rhabdomyolysis   G72.9  Myopathy, unspecified  M60.9  Myositis, unspecified  M79.10 Myalgia, unspecified site                 Updates Requested / Reviewed:        Health Maintenance Topics Overdue:      VB Score: 0     Patient is not due for any topics at this time.    RSV Vaccine                  Health Maintenance Topic(s) Outreach Outcomes & Actions Taken:    Medication Adherence / Statins - Outreach Outcomes & Actions Taken  : Sent Provider Message to Review to Evaluate Pt for Statin, Add Exclusion Dx Codes, Document Exclusion in Problem List, Change Statin Intensity Level to Moderate or High Intensity if Applicable

## 2024-03-15 ENCOUNTER — TELEPHONE (OUTPATIENT)
Dept: CARDIOLOGY | Facility: HOSPITAL | Age: 74
End: 2024-03-15

## 2024-03-15 PROBLEM — G72.9 MYOPATHY, UNSPECIFIED: Status: ACTIVE | Noted: 2024-03-15

## 2024-03-15 NOTE — TELEPHONE ENCOUNTER
Left message on voicemail.     Patient advised, test will be at [facility]   Will need to register on the first floor at the main entrance.   Patient advised that arrival time is 9:15am.  Patient advised, may take his medications prior to testing if you need to.  Advised if he needs to eat to take his medications, please keep it light, like toast and juice.    Patient advised to avoid all caffeine 12 hours prior to testing.  This includes decaf tea and coffee.    Wear comfortable clothing.   No lotions, oils, or powders to the upper chest area. May wear deodorant.    Advised to call the office if any questions.

## 2024-03-18 ENCOUNTER — HOSPITAL ENCOUNTER (OUTPATIENT)
Dept: CARDIOLOGY | Facility: HOSPITAL | Age: 74
Discharge: HOME | End: 2024-03-18
Attending: FAMILY MEDICINE
Payer: MEDICARE

## 2024-03-18 DIAGNOSIS — Z01.810 ENCOUNTER FOR PREPROCEDURAL CARDIOVASCULAR EXAMINATION: ICD-10-CM

## 2024-03-18 DIAGNOSIS — Z01.818 PRE-OP EVALUATION: ICD-10-CM

## 2024-03-18 LAB
CV STRESS BASE HR: 60 BPM
DIASTOLIC BLOOD PRESSURE: 84 MMHG
OHS CV CPX 1 MINUTE RECOVERY HEART RATE: 115 BPM
OHS CV CPX 85 PERCENT MAX PREDICTED HEART RATE MALE: 127
OHS CV CPX ESTIMATED METS: 10
OHS CV CPX MAX PREDICTED HEART RATE: 149
OHS CV CPX PATIENT IS FEMALE: 0
OHS CV CPX PATIENT IS MALE: 1
OHS CV CPX PEAK DIASTOLIC BLOOD PRESSURE: 88 MMHG
OHS CV CPX PEAK HEAR RATE: 138 BPM
OHS CV CPX PEAK RATE PRESSURE PRODUCT: NORMAL
OHS CV CPX PEAK SYSTOLIC BLOOD PRESSURE: 192 MMHG
OHS CV CPX PERCENT MAX PREDICTED HEART RATE ACHIEVED: 93
OHS CV CPX RATE PRESSURE PRODUCT PRESENTING: 8040
STRESS ECHO POST EXERCISE DUR MIN: 8 MINUTES
STRESS ECHO POST EXERCISE DUR SEC: 0 SECONDS
SYSTOLIC BLOOD PRESSURE: 134 MMHG

## 2024-03-18 PROCEDURE — 93017 CV STRESS TEST TRACING ONLY: CPT

## 2024-03-18 PROCEDURE — 93018 CV STRESS TEST I&R ONLY: CPT | Mod: ,,, | Performed by: INTERNAL MEDICINE

## 2024-03-18 PROCEDURE — 93016 CV STRESS TEST SUPVJ ONLY: CPT | Mod: ,,,

## 2024-03-19 NOTE — PROGRESS NOTES
Subjective     Patient ID: Shree Heredia is a 73 y.o. male.    Chief Complaint: Hyperlipidemia, Prostate Cancer, and Hyperuricemia    Patient presents here for annual follow-up of hyperlipidemia, prostate cancer, and hyperuricemia.  His weight is stable since his visit last year and his BMI is 27.03.  His hyperlipidemia has been fairly well controlled with his present use of Zetia.  His last lipid profile showed a total cholesterol 206 and .  He is intolerant of statins due to myopathy from the statins.  He also does have a history of prostate cancer.  He underwent brachytherapy with good success and his PSA is remain stable and prostate cancer in remission.  He is followed by Urology.  He does have a history of hyperuricemia with gout in the past although he has not had a gout attack in several years.  He is tolerating his allopurinol very well.  He is having problems with both shoulders and is scheduled to have left shoulder surgery on 04/03/2024.  He is scheduled for lab work prior to that.  On evaluating his history, he has not had a stress test.  I do feel like with the patient's age and his hyperlipidemia that he does need preoperative cardiovascular clearance with a stress test.  Patient is agreeable.  As far as health maintenance, he is up-to-date with all of his recommended screening exams and immunizations with the exception of RSV vaccine, which I do not think the patient needs at this time.      Review of Systems   Constitutional:  Negative for chills, fatigue, fever and unexpected weight change.   HENT:  Negative for nasal congestion, ear pain, postnasal drip and sore throat.    Respiratory:  Negative for cough and shortness of breath.    Cardiovascular:  Negative for chest pain and palpitations.   Gastrointestinal:  Negative for abdominal pain, diarrhea, nausea and vomiting.   Genitourinary:  Negative for difficulty urinating and dysuria.   Musculoskeletal:  Positive for arthralgias  (bilateral shoulder pain). Negative for back pain.   Neurological:  Negative for dizziness, light-headedness and headaches.   Psychiatric/Behavioral:  Negative for sleep disturbance. The patient is not nervous/anxious.           Objective     Physical Exam  Vitals reviewed.   Constitutional:       General: He is not in acute distress.     Appearance: Normal appearance. He is well-developed.   HENT:      Right Ear: Tympanic membrane and external ear normal.      Left Ear: Tympanic membrane and external ear normal.      Mouth/Throat:      Pharynx: Oropharynx is clear. No posterior oropharyngeal erythema.   Neck:      Thyroid: No thyromegaly.      Vascular: No carotid bruit.   Cardiovascular:      Rate and Rhythm: Normal rate and regular rhythm.      Pulses: Normal pulses.      Heart sounds: Normal heart sounds. No murmur heard.  Pulmonary:      Effort: Pulmonary effort is normal.      Breath sounds: Normal breath sounds. No wheezing or rales.   Musculoskeletal:      Cervical back: Normal range of motion and neck supple.      Right lower leg: No edema.      Left lower leg: No edema.      Comments: Decreased ROM bilateral shoulders with pain with movement   Lymphadenopathy:      Cervical: No cervical adenopathy.   Neurological:      General: No focal deficit present.      Mental Status: He is alert and oriented to person, place, and time.      Cranial Nerves: No cranial nerve deficit.      Deep Tendon Reflexes: Reflexes are normal and symmetric.            Assessment and Plan     1. Mixed hyperlipidemia  -     Lipid Panel; Future; Expected date: 03/14/2024    2. Prostate cancer    3. Abdominal aortic atherosclerosis    4. Hyperuricemia    5. Myopathy, unspecified    6. Statin intolerance    7. Pre-op evaluation  -     EKG 12-lead  -     Exercise Stress - EKG; Future    8. Encounter for preprocedural cardiovascular examination  -     Exercise Stress - EKG; Future        1. Continue present medication as his  hyperlipidemia, hyperuricemia, and prostate cancer stable  2. Continue low-fat low-cholesterol diet and exercise.  BMI is 27.03  3. He does have abdominal aortic atherosclerosis on previous scans and the treatment for this is control of his hyperlipidemia  4. 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 Exercise stress test for clearance prior to his shoulder surgery  6. Follow-up in 1 year          Follow up in about 1 year (around 3/14/2025).

## 2024-03-27 ENCOUNTER — HOSPITAL ENCOUNTER (OUTPATIENT)
Dept: PREADMISSION TESTING | Facility: HOSPITAL | Age: 74
Discharge: HOME | End: 2024-03-27
Attending: ORTHOPAEDIC SURGERY
Payer: MEDICARE

## 2024-03-27 VITALS
OXYGEN SATURATION: 99 % | RESPIRATION RATE: 14 BRPM | DIASTOLIC BLOOD PRESSURE: 74 MMHG | BODY MASS INDEX: 26.52 KG/M2 | HEART RATE: 62 BPM | SYSTOLIC BLOOD PRESSURE: 132 MMHG | TEMPERATURE: 98 F | HEIGHT: 66 IN | WEIGHT: 165 LBS

## 2024-03-27 DIAGNOSIS — M19.012 PRIMARY OSTEOARTHRITIS OF LEFT SHOULDER: ICD-10-CM

## 2024-03-27 DIAGNOSIS — E78.2 MIXED HYPERLIPIDEMIA: ICD-10-CM

## 2024-03-27 DIAGNOSIS — Z01.818 PRE-OP TESTING: ICD-10-CM

## 2024-03-27 LAB
ANION GAP SERPL CALC-SCNC: 7 MMOL/L (ref 8–16)
BASOPHILS # BLD AUTO: 0.05 K/UL (ref 0–0.2)
BASOPHILS NFR BLD: 0.8 % (ref 0–1.9)
BUN SERPL-MCNC: 13 MG/DL (ref 8–23)
CALCIUM SERPL-MCNC: 10.1 MG/DL (ref 8.7–10.5)
CHLORIDE SERPL-SCNC: 106 MMOL/L (ref 95–110)
CHOLEST SERPL-MCNC: 218 MG/DL (ref 120–199)
CHOLEST/HDLC SERPL: 5.3 {RATIO} (ref 2–5)
CO2 SERPL-SCNC: 29 MMOL/L (ref 23–29)
CREAT SERPL-MCNC: 1.1 MG/DL (ref 0.5–1.4)
DIFFERENTIAL METHOD BLD: NORMAL
EOSINOPHIL # BLD AUTO: 0.1 K/UL (ref 0–0.5)
EOSINOPHIL NFR BLD: 2.4 % (ref 0–8)
ERYTHROCYTE [DISTWIDTH] IN BLOOD BY AUTOMATED COUNT: 13.4 % (ref 11.5–14.5)
EST. GFR  (NO RACE VARIABLE): >60 ML/MIN/1.73 M^2
GLUCOSE SERPL-MCNC: 96 MG/DL (ref 70–110)
HCT VFR BLD AUTO: 50.1 % (ref 40–54)
HDLC SERPL-MCNC: 41 MG/DL (ref 40–75)
HDLC SERPL: 18.8 % (ref 20–50)
HGB BLD-MCNC: 16.4 G/DL (ref 14–18)
IMM GRANULOCYTES # BLD AUTO: 0.02 K/UL (ref 0–0.04)
IMM GRANULOCYTES NFR BLD AUTO: 0.3 % (ref 0–0.5)
LDLC SERPL CALC-MCNC: 139.6 MG/DL (ref 63–159)
LYMPHOCYTES # BLD AUTO: 1.5 K/UL (ref 1–4.8)
LYMPHOCYTES NFR BLD: 25.2 % (ref 18–48)
MCH RBC QN AUTO: 30.1 PG (ref 27–31)
MCHC RBC AUTO-ENTMCNC: 32.7 G/DL (ref 32–36)
MCV RBC AUTO: 92 FL (ref 82–98)
MONOCYTES # BLD AUTO: 0.5 K/UL (ref 0.3–1)
MONOCYTES NFR BLD: 8.7 % (ref 4–15)
NEUTROPHILS # BLD AUTO: 3.7 K/UL (ref 1.8–7.7)
NEUTROPHILS NFR BLD: 62.6 % (ref 38–73)
NONHDLC SERPL-MCNC: 177 MG/DL
NRBC BLD-RTO: 0 /100 WBC
PLATELET # BLD AUTO: 213 K/UL (ref 150–450)
PMV BLD AUTO: 9.8 FL (ref 9.2–12.9)
POTASSIUM SERPL-SCNC: 5 MMOL/L (ref 3.5–5.1)
RBC # BLD AUTO: 5.45 M/UL (ref 4.6–6.2)
SODIUM SERPL-SCNC: 142 MMOL/L (ref 136–145)
TRIGL SERPL-MCNC: 187 MG/DL (ref 30–150)
WBC # BLD AUTO: 5.95 K/UL (ref 3.9–12.7)

## 2024-03-27 PROCEDURE — 80061 LIPID PANEL: CPT | Performed by: FAMILY MEDICINE

## 2024-03-27 PROCEDURE — 85025 COMPLETE CBC W/AUTO DIFF WBC: CPT | Performed by: ORTHOPAEDIC SURGERY

## 2024-03-27 PROCEDURE — 36415 COLL VENOUS BLD VENIPUNCTURE: CPT | Performed by: ORTHOPAEDIC SURGERY

## 2024-03-27 PROCEDURE — 80048 BASIC METABOLIC PNL TOTAL CA: CPT | Performed by: ORTHOPAEDIC SURGERY

## 2024-03-27 NOTE — DISCHARGE INSTRUCTIONS
INSTRUCTIONS  To confirm your doctor has scheduled your surgery for:    COVID TESTING SCHEDULED:       Morning of surgery please check in with registration near Parking Garage Entrance then proceed to Outpatient Surgery Department.    Preop nurses will call the afternoon prior to surgery between 4:00 and 6:00 PM with your final arrival time.  PLEASE NOTE:  The surgery schedule has many variables which may affect the time of your surgery case. Family members should be available if your surgery time changes. Plan to be here the day of your procedure between 4-6 hours.    TAKE ONLY THESE MEDICATIONS WITH A SMALL SIP OF WATER THE MORNING OF SURGERY: see list    DO NOT TAKE THESE MEDICATIONS 5-7 DAYS PRIOR to your procedure per your surgeon's request: ASPIRIN, ALEVE, BC powder, SEGUNDO SELTZER, IBUPROFEN, FISH OIL, VITAMIN E, OR HERBALS   (May take Tylenol)    If you are prescribed any types of blood thinners (Aspirin, Coumadin, Plavix, Pradaxa, Xarelto, Aggrenox, Effient, Eliquis, Savasya, Brilinta or any other), please ask your surgeon how many days before scheduled procedure should you stop taking them. You may also need to verify with prescribing physician if it is OK to stop your blood thinners.      INSTRUCTIONS IMPORTANT!!  Do not eat or drink anything after midnight.  ONLY if you are diabetic, check your sugar in the morning before your procedure.  Do not smoke, vape or drink alcoholic beverages 24 hours prior to your procedure.  Shower the night before AND the morning of your procedure with a Chlorhexidine wash such as hibiclens or Dial antibacterial soap from neck down. You may use your own shampoo and face wash. This helps your skin to be as bacteria free as possible.  If you wear contact lenses, dentures, hearing aids or glasses, bring a container to put them in and give to a family member.    Please leave all jewelry, piercings and valuables at home.  DO NOT remove hair from the surgery site.   If your  condition changes such as fever, cough, etc, please notify your surgeon.   ONLY if you have been diagnosed with sleep apnea please bring your C-PAP machine.  ONLY if you wear home oxygen please bring your portable oxygen tank the day of your procedure.   ONLY for patients requiring bowel prep, written instructions will be given by your doctor's office.  ONLY if you have a neuro stimulator, please bring the controller with you the morning of surgery  Make arrangements in advance for transportation home by a responsible adult.  You must make arrangements for transportation, TAXI'S, UBER'S OR LYFTS ARE NOT ALLOWED.        If you have any questions about these instructions, call Pre-Op Admit  Nursing at [phone] or the Pre-Op Day Surgery Unit at [phone].

## 2024-04-01 NOTE — PROGRESS NOTES
[facility]  [address]  [place] [address]  Phone: [phone]   [phone]    Patient's PCP:Brendan Frank Jr., MD  Referring Provider: No ref. provider found    POST-OP Note:    Subjective:        Chief Complaint:   Chief Complaint   Patient presents with    Left Shoulder - Post-op Evaluation     POD 1 Left TSA 4.324 notes increased nausea        Past Medical History:   Diagnosis Date    Allergic rhinitis, seasonal     Asthma     Back pain     DDD (degenerative disc disease), cervical     Digestive disorder     Gout attack     Hyperlipidemia     PONV (postoperative nausea and vomiting)     Prostate cancer     Prostate cancer 08/01/2019       Past Surgical History:   Procedure Laterality Date    BACK SURGERY  01/29/2016    CYSTOSCOPY N/A 12/1/2021    Procedure: CYSTOSCOPY;  Surgeon: Marek Guidry Jr., MD;  Location: [facility];  Service: Urology;  Laterality: N/A;    INGUINAL HERNIA REPAIR         Current Outpatient Medications   Medication Sig    allopurinoL (ZYLOPRIM) 100 MG tablet TAKE 2 TABLETS BY MOUTH EVERY DAY    aspirin (ECOTRIN) 81 MG EC tablet Take 1 tablet (81 mg total) by mouth every 12 (twelve) hours.    celecoxib (CELEBREX) 200 MG capsule Take 1 capsule (200 mg total) by mouth 2 (two) times daily.    cetirizine (ZYRTEC) 10 MG tablet Take 10 mg by mouth every evening.    colchicine (COLCRYS) 0.6 mg tablet TAKE 1 TABLET BY MOUTH EVERY DAY    docusate sodium (COLACE) 100 MG capsule Take 1 capsule (100 mg total) by mouth 2 (two) times daily.    ezetimibe (ZETIA) 10 mg tablet TAKE 1 TABLET BY MOUTH EVERY DAY    fluticasone propionate (FLONASE) 50 mcg/actuation nasal spray 1 spray (50 mcg total) by Each Nostril route once daily.    gabapentin (NEURONTIN) 300 MG capsule Take 1 capsule (300 mg total) by mouth 2 (two) times daily.    omega-3 acid ethyl esters (LOVAZA) 1 gram capsule TAKE 2 CAPSULES BY MOUTH TWICE A DAY    oxyCODONE-acetaminophen (PERCOCET) 7.5-325 mg per  tablet Take 1 tablet by mouth every 6 (six) hours as needed for Pain.    tamsulosin (FLOMAX) 0.4 mg Cap Take 1 capsule (0.4 mg total) by mouth once daily.    VYZULTA 0.024 % Drop Place 1 drop into both eyes once daily.     No current facility-administered medications for this visit.     Facility-Administered Medications Ordered in Other Visits   Medication    triamcinolone acetonide injection 40 mg       Review of patient's allergies indicates:   Allergen Reactions    Grass pollen- grass standard Other (See Comments)     Triggers his sinuses    Permethrin      Pt is not sure of this allergy      Statins-hmg-coa reductase inhibitors Other (See Comments)     Myopathy       Family History   Problem Relation Age of Onset    Hypertension Mother     Diabetes Mother     Hyperlipidemia Mother     Heart disease Mother     Glaucoma Mother     Glaucoma Father     Hyperlipidemia Father     Diabetes Father     Heart disease Sister     Diabetes Sister     Hypertension Sister     Heart disease Maternal Aunt     Heart disease Maternal Uncle        Social History     Socioeconomic History    Marital status:    Occupational History     Employer: [employer]   Tobacco Use    Smoking status: Former     Current packs/day: 0.00     Types: Cigarettes     Quit date: 2010     Years since quittin.2    Smokeless tobacco: Never   Substance and Sexual Activity    Alcohol use: Yes     Comment: soc    Drug use: Never    Sexual activity: Yes     Partners: Female     Social Determinants of Health     Financial Resource Strain: Low Risk  (2022)    Overall Financial Resource Strain (CARDIA)     Difficulty of Paying Living Expenses: Not hard at all   Food Insecurity: No Food Insecurity (2022)    Hunger Vital Sign     Worried About Running Out of Food in the Last Year: Never true     Ran Out of Food in the Last Year: Never true   Transportation Needs: No Transportation Needs (2022)    PRAPARE - Transportation     Lack of  Transportation (Medical): No     Lack of Transportation (Non-Medical): No   Physical Activity: Sufficiently Active (8/2/2022)    Exercise Vital Sign     Days of Exercise per Week: 5 days     Minutes of Exercise per Session: 60 min   Stress: No Stress Concern Present (8/2/2022)    [employer] - Occupational Stress Questionnaire     Feeling of Stress : Only a little   Social Connections: Moderately Integrated (8/2/2022)    Social Connection and Isolation Panel [NHANES]     Frequency of Communication with Friends and Family: More than three times a week     Frequency of Social Gatherings with Friends and Family: More than three times a week     Attends Pentec[employer]al Services: More than 4 times per year     Active Member of Clubs or Organizations: No     Attends Club or Organization Meetings: Never     Marital Status:    Housing Stability: Low Risk  (8/2/2022)    Housing Stability Vital Sign     Unable to Pay for Housing in the Last Year: No     Number of Places Lived in the Last Year: 1     Unstable Housing in the Last Year: No       History of present illness:  74-year-old male, returns to clinic today status post left total shoulder arthroplasty April 3rd.  Here today for routine follow-up.      Review of Systems:    Musculoskeletal:  See HPI       Objective:        Physical Examination:    Vital Signs: There were no vitals filed for this visit.    Body mass index is 26.62 kg/m².    This a well-developed, well nourished patient in no acute distress.  They are alert and oriented and cooperative to examination.        Examination of the left shoulder, the patient's surgical dressings were removed.  Scant amount of dry bloody drainage was noted on the dressings.  No active bleeding or drainage is appreciated.  Mild tenderness to palpation as expected over the surgical site.  Gentle range of motion of the left shoulder without significant discomfort.  Normal range of motion in the elbow  wrist hand and fingers.  Distally neurovascularly intact.    Pertinent New Results:     XRAY Report / Interpretation:        Assessment:       1. Status post shoulder replacement, left      Plan:     Status post shoulder replacement, left  -     Ambulatory referral/consult to Physical/Occupational Therapy; Future; Expected date: 04/11/2024        Follow up for Re-check symptoms, Suture/staple removal, As scheduled.    74-year-old male with known osteoarthritis in the left shoulder, recently underwent left shoulder replacement.  He is still having some residual affects from his block.  He can come out of his sling as soon as the affects of the block have worn off.  He will wear his sling at night while he sleeping.  We will start physical therapy for range of motion.  We will check him back in about 2 weeks for suture removal.    Wound Care review: on approximately day 3 after surgery, or 72 hours after your initial surgery date, you may begin cleaning your wounds (AS LONG AS THERE IS NO DRAINAGE PRESENT).     Gentle cleansing with a mild soap and water is recommended. Pat the area dry, and then cover the wound with a clean, sterile dressing.  Do this at least once daily.  Do not apply any ointments creams or lotions to the wounds until told to do so.  Avoid submerging or immersing the wounds in water such as a sink, tub, or pool for at least 1 month after surgery.     PAMELA Santamaria, PA-C    This note was created using [employer] voice recognition software that occasionally misinterprets words or phrases.

## 2024-04-02 DIAGNOSIS — M19.012 PRIMARY OSTEOARTHRITIS OF LEFT SHOULDER: Primary | ICD-10-CM

## 2024-04-02 RX ORDER — CELECOXIB 200 MG/1
200 CAPSULE ORAL 2 TIMES DAILY
Qty: 60 CAPSULE | Refills: 0 | Status: SHIPPED | OUTPATIENT
Start: 2024-04-02 | End: 2024-04-22

## 2024-04-02 RX ORDER — OXYCODONE AND ACETAMINOPHEN 7.5; 325 MG/1; MG/1
1 TABLET ORAL EVERY 6 HOURS PRN
Qty: 28 TABLET | Refills: 0 | Status: SHIPPED | OUTPATIENT
Start: 2024-04-02 | End: 2024-04-22 | Stop reason: ALTCHOICE

## 2024-04-02 RX ORDER — GABAPENTIN 300 MG/1
300 CAPSULE ORAL 2 TIMES DAILY
Qty: 60 CAPSULE | Refills: 0 | Status: SHIPPED | OUTPATIENT
Start: 2024-04-02 | End: 2024-04-22

## 2024-04-02 RX ORDER — DOCUSATE SODIUM 100 MG/1
100 CAPSULE, LIQUID FILLED ORAL 2 TIMES DAILY
Qty: 60 CAPSULE | Refills: 0 | Status: SHIPPED | OUTPATIENT
Start: 2024-04-02 | End: 2024-04-22

## 2024-04-02 RX ORDER — ASPIRIN 81 MG/1
81 TABLET ORAL EVERY 12 HOURS
Qty: 60 TABLET | Refills: 0 | Status: SHIPPED | OUTPATIENT
Start: 2024-04-02 | End: 2024-04-22

## 2024-04-03 ENCOUNTER — HOSPITAL ENCOUNTER (OUTPATIENT)
Facility: HOSPITAL | Age: 74
Discharge: HOME | End: 2024-04-03
Attending: ORTHOPAEDIC SURGERY | Admitting: ORTHOPAEDIC SURGERY
Payer: MEDICARE

## 2024-04-03 ENCOUNTER — ANESTHESIA (OUTPATIENT)
Dept: SURGERY | Facility: HOSPITAL | Age: 74
End: 2024-04-03
Payer: MEDICARE

## 2024-04-03 ENCOUNTER — ANESTHESIA EVENT (OUTPATIENT)
Dept: SURGERY | Facility: HOSPITAL | Age: 74
End: 2024-04-03
Payer: MEDICARE

## 2024-04-03 VITALS
DIASTOLIC BLOOD PRESSURE: 80 MMHG | HEIGHT: 66 IN | BODY MASS INDEX: 26.52 KG/M2 | SYSTOLIC BLOOD PRESSURE: 138 MMHG | WEIGHT: 165 LBS | HEART RATE: 63 BPM | RESPIRATION RATE: 16 BRPM | OXYGEN SATURATION: 96 % | TEMPERATURE: 96 F

## 2024-04-03 DIAGNOSIS — M19.012 PRIMARY OSTEOARTHRITIS OF LEFT SHOULDER: Primary | ICD-10-CM

## 2024-04-03 PROCEDURE — 64415 NJX AA&/STRD BRCH PLXS IMG: CPT | Performed by: STUDENT IN AN ORGANIZED HEALTH CARE EDUCATION/TRAINING PROGRAM

## 2024-04-03 PROCEDURE — 27201423 OPTIME MED/SURG SUP & DEVICES STERILE SUPPLY: Performed by: ORTHOPAEDIC SURGERY

## 2024-04-03 PROCEDURE — 25000003 PHARM REV CODE 250: Performed by: NURSE ANESTHETIST, CERTIFIED REGISTERED

## 2024-04-03 PROCEDURE — C1776 JOINT DEVICE (IMPLANTABLE): HCPCS | Performed by: ORTHOPAEDIC SURGERY

## 2024-04-03 PROCEDURE — 23430 REPAIR BICEPS TENDON: CPT | Mod: 59,LT,, | Performed by: ORTHOPAEDIC SURGERY

## 2024-04-03 PROCEDURE — D9220A PRA ANESTHESIA: Mod: CRNA,,, | Performed by: NURSE ANESTHETIST, CERTIFIED REGISTERED

## 2024-04-03 PROCEDURE — 63600175 PHARM REV CODE 636 W HCPCS: Performed by: ORTHOPAEDIC SURGERY

## 2024-04-03 PROCEDURE — 71000015 HC POSTOP RECOV 1ST HR: Performed by: ORTHOPAEDIC SURGERY

## 2024-04-03 PROCEDURE — 71000033 HC RECOVERY, INTIAL HOUR: Performed by: ORTHOPAEDIC SURGERY

## 2024-04-03 PROCEDURE — 36000711: Performed by: ORTHOPAEDIC SURGERY

## 2024-04-03 PROCEDURE — 36000710: Performed by: ORTHOPAEDIC SURGERY

## 2024-04-03 PROCEDURE — 37000008 HC ANESTHESIA 1ST 15 MINUTES: Performed by: ORTHOPAEDIC SURGERY

## 2024-04-03 PROCEDURE — 25000003 PHARM REV CODE 250: Performed by: ORTHOPAEDIC SURGERY

## 2024-04-03 PROCEDURE — 71000039 HC RECOVERY, EACH ADD'L HOUR: Performed by: ORTHOPAEDIC SURGERY

## 2024-04-03 PROCEDURE — C9290 INJ, BUPIVACAINE LIPOSOME: HCPCS | Performed by: STUDENT IN AN ORGANIZED HEALTH CARE EDUCATION/TRAINING PROGRAM

## 2024-04-03 PROCEDURE — C1769 GUIDE WIRE: HCPCS | Performed by: ORTHOPAEDIC SURGERY

## 2024-04-03 PROCEDURE — 37000009 HC ANESTHESIA EA ADD 15 MINS: Performed by: ORTHOPAEDIC SURGERY

## 2024-04-03 PROCEDURE — 63600175 PHARM REV CODE 636 W HCPCS: Performed by: NURSE ANESTHETIST, CERTIFIED REGISTERED

## 2024-04-03 PROCEDURE — D9220A PRA ANESTHESIA: Mod: ANES,,, | Performed by: ANESTHESIOLOGY

## 2024-04-03 PROCEDURE — 63600175 PHARM REV CODE 636 W HCPCS: Mod: JZ,JG | Performed by: STUDENT IN AN ORGANIZED HEALTH CARE EDUCATION/TRAINING PROGRAM

## 2024-04-03 PROCEDURE — C1713 ANCHOR/SCREW BN/BN,TIS/BN: HCPCS | Performed by: ORTHOPAEDIC SURGERY

## 2024-04-03 PROCEDURE — 23472 RECONSTRUCT SHOULDER JOINT: CPT | Mod: LT,,, | Performed by: ORTHOPAEDIC SURGERY

## 2024-04-03 DEVICE — IMPLANTABLE DEVICE: Type: IMPLANTABLE DEVICE | Site: SHOULDER | Status: FUNCTIONAL

## 2024-04-03 DEVICE — CEMENT BONE SIMPLEX HV RADPQ: Type: IMPLANTABLE DEVICE | Site: SHOULDER | Status: FUNCTIONAL

## 2024-04-03 DEVICE — STEM HUM ANATOMIC ALTIVATE 12: Type: IMPLANTABLE DEVICE | Site: SHOULDER | Status: FUNCTIONAL

## 2024-04-03 DEVICE — STEM HUM ANATOMIC ALTIVATE NT: Type: IMPLANTABLE DEVICE | Site: SHOULDER | Status: FUNCTIONAL

## 2024-04-03 RX ORDER — OXYCODONE HYDROCHLORIDE 5 MG/1
5 TABLET ORAL
Status: DISCONTINUED | OUTPATIENT
Start: 2024-04-03 | End: 2024-04-03 | Stop reason: HOSPADM

## 2024-04-03 RX ORDER — ONDANSETRON HYDROCHLORIDE 2 MG/ML
INJECTION, SOLUTION INTRAVENOUS
Status: DISCONTINUED | OUTPATIENT
Start: 2024-04-03 | End: 2024-04-03

## 2024-04-03 RX ORDER — BUPIVACAINE HYDROCHLORIDE 5 MG/ML
INJECTION, SOLUTION EPIDURAL; INTRACAUDAL
Status: COMPLETED | OUTPATIENT
Start: 2024-04-03 | End: 2024-04-03

## 2024-04-03 RX ORDER — BUPIVACAINE HYDROCHLORIDE AND EPINEPHRINE 2.5; 5 MG/ML; UG/ML
INJECTION, SOLUTION EPIDURAL; INFILTRATION; INTRACAUDAL; PERINEURAL
Status: DISCONTINUED | OUTPATIENT
Start: 2024-04-03 | End: 2024-04-03 | Stop reason: HOSPADM

## 2024-04-03 RX ORDER — FENTANYL CITRATE 50 UG/ML
INJECTION, SOLUTION INTRAMUSCULAR; INTRAVENOUS
Status: DISCONTINUED | OUTPATIENT
Start: 2024-04-03 | End: 2024-04-03

## 2024-04-03 RX ORDER — ONDANSETRON HYDROCHLORIDE 2 MG/ML
4 INJECTION, SOLUTION INTRAVENOUS DAILY PRN
Status: DISCONTINUED | OUTPATIENT
Start: 2024-04-03 | End: 2024-04-03 | Stop reason: HOSPADM

## 2024-04-03 RX ORDER — FAMOTIDINE 10 MG/ML
INJECTION INTRAVENOUS
Status: DISCONTINUED | OUTPATIENT
Start: 2024-04-03 | End: 2024-04-03

## 2024-04-03 RX ORDER — DIPHENHYDRAMINE HYDROCHLORIDE 50 MG/ML
12.5 INJECTION INTRAMUSCULAR; INTRAVENOUS
Status: DISCONTINUED | OUTPATIENT
Start: 2024-04-03 | End: 2024-04-03 | Stop reason: HOSPADM

## 2024-04-03 RX ORDER — ACETAMINOPHEN 10 MG/ML
INJECTION, SOLUTION INTRAVENOUS
Status: DISCONTINUED | OUTPATIENT
Start: 2024-04-03 | End: 2024-04-03

## 2024-04-03 RX ORDER — PHENYLEPHRINE HCL IN 0.9% NACL 1 MG/10 ML
SYRINGE (ML) INTRAVENOUS
Status: DISCONTINUED | OUTPATIENT
Start: 2024-04-03 | End: 2024-04-03

## 2024-04-03 RX ORDER — CEFAZOLIN SODIUM 2 G/50ML
2 SOLUTION INTRAVENOUS
Status: COMPLETED | OUTPATIENT
Start: 2024-04-03 | End: 2024-04-03

## 2024-04-03 RX ORDER — LIDOCAINE HYDROCHLORIDE 20 MG/ML
INJECTION, SOLUTION EPIDURAL; INFILTRATION; INTRACAUDAL; PERINEURAL
Status: DISCONTINUED | OUTPATIENT
Start: 2024-04-03 | End: 2024-04-03

## 2024-04-03 RX ORDER — MIDAZOLAM HYDROCHLORIDE 1 MG/ML
INJECTION INTRAMUSCULAR; INTRAVENOUS
Status: DISCONTINUED | OUTPATIENT
Start: 2024-04-03 | End: 2024-04-03

## 2024-04-03 RX ORDER — ROCURONIUM BROMIDE 10 MG/ML
INJECTION, SOLUTION INTRAVENOUS
Status: DISCONTINUED | OUTPATIENT
Start: 2024-04-03 | End: 2024-04-03

## 2024-04-03 RX ORDER — TRANEXAMIC ACID 10 MG/ML
1000 INJECTION, SOLUTION INTRAVENOUS
Status: DISCONTINUED | OUTPATIENT
Start: 2024-04-03 | End: 2024-04-03 | Stop reason: HOSPADM

## 2024-04-03 RX ORDER — MEPERIDINE HYDROCHLORIDE 50 MG/ML
12.5 INJECTION INTRAMUSCULAR; INTRAVENOUS; SUBCUTANEOUS EVERY 10 MIN PRN
Status: DISCONTINUED | OUTPATIENT
Start: 2024-04-03 | End: 2024-04-03 | Stop reason: HOSPADM

## 2024-04-03 RX ORDER — HYDROMORPHONE HYDROCHLORIDE 1 MG/ML
0.2 INJECTION, SOLUTION INTRAMUSCULAR; INTRAVENOUS; SUBCUTANEOUS EVERY 5 MIN PRN
Status: DISCONTINUED | OUTPATIENT
Start: 2024-04-03 | End: 2024-04-03 | Stop reason: HOSPADM

## 2024-04-03 RX ORDER — DEXAMETHASONE SODIUM PHOSPHATE 4 MG/ML
INJECTION, SOLUTION INTRA-ARTICULAR; INTRALESIONAL; INTRAMUSCULAR; INTRAVENOUS; SOFT TISSUE
Status: DISCONTINUED | OUTPATIENT
Start: 2024-04-03 | End: 2024-04-03

## 2024-04-03 RX ORDER — PROPOFOL 10 MG/ML
VIAL (ML) INTRAVENOUS
Status: DISCONTINUED | OUTPATIENT
Start: 2024-04-03 | End: 2024-04-03

## 2024-04-03 RX ADMIN — SODIUM CHLORIDE, SODIUM LACTATE, POTASSIUM CHLORIDE, AND CALCIUM CHLORIDE: .6; .31; .03; .02 INJECTION, SOLUTION INTRAVENOUS at 09:04

## 2024-04-03 RX ADMIN — FENTANYL CITRATE 100 MCG: 50 INJECTION, SOLUTION INTRAMUSCULAR; INTRAVENOUS at 07:04

## 2024-04-03 RX ADMIN — GLYCOPYRROLATE 0.4 MG: 0.2 INJECTION, SOLUTION INTRAMUSCULAR; INTRAVITREAL at 09:04

## 2024-04-03 RX ADMIN — ROCURONIUM BROMIDE 40 MG: 10 INJECTION, SOLUTION INTRAVENOUS at 08:04

## 2024-04-03 RX ADMIN — MIDAZOLAM HYDROCHLORIDE 2 MG: 1 INJECTION, SOLUTION INTRAMUSCULAR; INTRAVENOUS at 07:04

## 2024-04-03 RX ADMIN — FAMOTIDINE 20 MG: 10 INJECTION, SOLUTION INTRAVENOUS at 08:04

## 2024-04-03 RX ADMIN — LIDOCAINE HYDROCHLORIDE 50 MG: 20 INJECTION, SOLUTION INTRAVENOUS at 08:04

## 2024-04-03 RX ADMIN — ACETAMINOPHEN 1000 MG: 10 INJECTION, SOLUTION INTRAVENOUS at 08:04

## 2024-04-03 RX ADMIN — ROCURONIUM BROMIDE 20 MG: 10 INJECTION, SOLUTION INTRAVENOUS at 08:04

## 2024-04-03 RX ADMIN — CEFAZOLIN SODIUM 2 G: 2 SOLUTION INTRAVENOUS at 08:04

## 2024-04-03 RX ADMIN — BUPIVACAINE HYDROCHLORIDE 10 ML: 5 INJECTION, SOLUTION EPIDURAL; INTRACAUDAL; PERINEURAL at 07:04

## 2024-04-03 RX ADMIN — SUGAMMADEX 200 MG: 100 INJECTION, SOLUTION INTRAVENOUS at 10:04

## 2024-04-03 RX ADMIN — PROPOFOL 150 MG: 10 INJECTION, EMULSION INTRAVENOUS at 08:04

## 2024-04-03 RX ADMIN — DEXAMETHASONE SODIUM PHOSPHATE 4 MG: 4 INJECTION, SOLUTION INTRAMUSCULAR; INTRAVENOUS at 08:04

## 2024-04-03 RX ADMIN — BUPIVACAINE 10 ML: 13.3 INJECTION, SUSPENSION, LIPOSOMAL INFILTRATION at 07:04

## 2024-04-03 RX ADMIN — ONDANSETRON 4 MG: 2 INJECTION INTRAMUSCULAR; INTRAVENOUS at 08:04

## 2024-04-03 RX ADMIN — SODIUM CHLORIDE, SODIUM LACTATE, POTASSIUM CHLORIDE, AND CALCIUM CHLORIDE: .6; .31; .03; .02 INJECTION, SOLUTION INTRAVENOUS at 07:04

## 2024-04-03 RX ADMIN — ROCURONIUM BROMIDE 10 MG: 10 INJECTION, SOLUTION INTRAVENOUS at 08:04

## 2024-04-03 RX ADMIN — Medication 100 MCG: at 08:04

## 2024-04-03 NOTE — ANESTHESIA PROCEDURE NOTES
Intubation    Date/Time: 4/3/2024 8:06 AM    Performed by: Shirley Doty CRNA  Authorized by: Iam Coe Jr., MD    Intubation:     Induction:  Intravenous    Intubated:  Postinduction    Mask Ventilation:  Easy mask    Attempts:  1    Attempted By:  CRNA    Method of Intubation:  Video laryngoscopy and direct    Blade:  Kirk 3    Laryngeal View Grade: Grade I - full view of cords      Difficult Airway Encountered?: No      Complications:  None    Airway Device:  Oral endotracheal tube    Airway Device Size:  7.5    Style/Cuff Inflation:  Cuffed (inflated to minimal occlusive pressure)    Tube secured:  23    Secured at:  The lips    Placement Verified By:  Capnometry    Complicating Factors:  None    Findings Post-Intubation:  BS equal bilateral and atraumatic/condition of teeth unchanged

## 2024-04-03 NOTE — PLAN OF CARE
Pt with dressing to left shoulder-  small amount of drainage noted to dressing - area circled.  Arm in immobilizer sling -  Interscalene block instructions reviewed with pt and scalene block band on. Discharge instructions reviewed with pt and wife.  Verbalized understanding.  Escorted to car in wheelchair and discharged to home with wife.

## 2024-04-03 NOTE — OP NOTE
[facility]  Surgery Department  Operative Note    SUMMARY     Date of Procedure: 4/3/2024     Procedure:  Total shoulder arthroplasty left                        Open biceps tenodesis    Surgeon(s) and Role:     * Germán Carty MD - Primary    Assisting Surgeon:  Mayo    Pre-Operative Diagnosis: Primary osteoarthritis of left shoulder [M19.012]    Post-Operative Diagnosis: Post-Op Diagnosis Codes:     * Primary osteoarthritis of left shoulder [M19.012]  Bicipital tendinitis severe     Anesthesia: Spinal    Intraoperative Findings:  Bone-on-bone arthritis left shoulder with severe fraying of the biceps tendon    Description of the Findings of the Procedure:  Patient was placed on the operative table in the beach chair position or.  He was prepped and draped in the usual sterile manner for surgery.  An incision was made from the coracoid distally.  It was carried down sharply through the skin.  The deltopectoral groove was identified and the vein was protected.  The subscapularis was taken down full-thickness and tagged for later reattachment.  He was extremely tight and a release was undertaken so that he could be fully externally rotated.  We were very careful to stay on bone away from the axillary nerve.  The head was now subluxed.  We now used a cutting jig and made our initial head cut.  At this point we reamed all the way up to a 12.  We then broached to a 12 and that gave us a perfect fit and fill.  We calcar planed in turned our attention to the glenoid.  Retractors were placed anteriorly and posteriorly.  The biceps tendon was released as it was very frayed flattened and subluxed.  It was tagged for tenodesis with a 1. Vicryl.  We placed a central guidewire using the guide and then reamed so that the glenoid would fit the back of the implant.  We now tapped our group drill guide into position and drilled our 3 peripheral screws.  Plugs were placed and then we removed the guide and drilled  our central screw.  We removed the jig.  We pulsed and irrigated mixed up bone cement.  We tapped our actual implant into position.  It fit perfectly.  We turned our attention back to the humerus.  We placed a trial head into position and the construct trialed absolutely beautifully.  I removed the trial components and tapped the stem and the head down into position.  The construct was reduced and trialed beautifully.  At this point we closed the subscapularis with 2. FiberWire.  The biceps tendon was tenodesed in its groove with 2. FiberWire.  We irrigated and closed the deltopectoral groove with a running 1. Vicryl.  The subcu was closed with 2-0 Vicryl and the skin with 4-0 Monocryl.  Sterile dressings were applied and the patient was noted to be in stable condition pending an x-ray neurovascular check    Complications: No    Estimated Blood Loss (EBL): * No values recorded between 4/3/2024  8:48 AM and 4/3/2024 10:06 AM *           Implants:   Implant Name Type Inv. Item Serial No.  Lot No. LRB No. Used Action   CEMENT BONE SIMPLEX HV RADPQ - [id number]  CEMENT BONE SIMPLEX HV RADPQ  [employer]. [id number] Left 1 Implanted   STEM HUM ANATOMIC ALTIVATE NT - [id number]  STEM HUM ANATOMIC ALTIVATE NT  DJO [id number] Left 1 Implanted   ALL-POLY Pegged glenoid, E-plus, 42mm    DJO [id number] Left 1 Implanted   3.2 Pins    DJO  Left 1 Implanted   2.4 Wire      Left 1 Implanted   STEM HUM ANATOMIC ALTIVATE 12 - [id number]  STEM HUM ANATOMIC ALTIVATE 12  DJO [id number] Left 1 Implanted   OFFSET HUMERAL HEAD, 46mm x 18mm    DJO [id number] Left 1 Implanted       Specimens:   Specimen (24h ago, onward)      None                    Condition: Good    Disposition: PACU - hemodynamically stable.              Discharge Note    SUMMARY     Admit Date: 4/3/2024    Discharge Date and Time:  04/03/2024 10:06 AM    Hospital Course (synopsis of major diagnoses, care, treatment, and services provided during the course of  the hospital stay): Uneventful      Final Diagnosis: Post-Op Diagnosis Codes:     * Primary osteoarthritis of left shoulder [M19.012]    Disposition: Home or Self Care    Follow Up/Patient Instructions:     Medications:  Reconciled Home Medications:   Current Discharge Medication List        CONTINUE these medications which have NOT CHANGED    Details   allopurinoL (ZYLOPRIM) 100 MG tablet TAKE 2 TABLETS BY MOUTH EVERY DAY  Qty: 180 tablet, Refills: 3    Associated Diagnoses: Hyperuricemia      colchicine (COLCRYS) 0.6 mg tablet TAKE 1 TABLET BY MOUTH EVERY DAY  Qty: 90 tablet, Refills: 3    Associated Diagnoses: Acute idiopathic gout, unspecified site      ezetimibe (ZETIA) 10 mg tablet TAKE 1 TABLET BY MOUTH EVERY DAY  Qty: 90 tablet, Refills: 3    Associated Diagnoses: Hyperlipidemia      fluticasone propionate (FLONASE) 50 mcg/actuation nasal spray 1 spray (50 mcg total) by Each Nostril route once daily.  Qty: 11.1 mL, Refills: 0      omega-3 acid ethyl esters (LOVAZA) 1 gram capsule TAKE 2 CAPSULES BY MOUTH TWICE A DAY  Qty: 360 capsule, Refills: 3    Associated Diagnoses: Hyperlipidemia      tamsulosin (FLOMAX) 0.4 mg Cap Take 1 capsule (0.4 mg total) by mouth once daily.  Qty: 90 capsule, Refills: 3      VYZULTA 0.024 % Drop Place 1 drop into both eyes once daily.      aspirin (ECOTRIN) 81 MG EC tablet Take 1 tablet (81 mg total) by mouth every 12 (twelve) hours.  Qty: 60 tablet, Refills: 0    Associated Diagnoses: Primary osteoarthritis of left shoulder      celecoxib (CELEBREX) 200 MG capsule Take 1 capsule (200 mg total) by mouth 2 (two) times daily.  Qty: 60 capsule, Refills: 0    Associated Diagnoses: Primary osteoarthritis of left shoulder      cetirizine (ZYRTEC) 10 MG tablet Take 10 mg by mouth every evening.      docusate sodium (COLACE) 100 MG capsule Take 1 capsule (100 mg total) by mouth 2 (two) times daily.  Qty: 60 capsule, Refills: 0    Associated Diagnoses: Primary osteoarthritis of left  shoulder      gabapentin (NEURONTIN) 300 MG capsule Take 1 capsule (300 mg total) by mouth 2 (two) times daily.  Qty: 60 capsule, Refills: 0    Associated Diagnoses: Primary osteoarthritis of left shoulder      oxyCODONE-acetaminophen (PERCOCET) 7.5-325 mg per tablet Take 1 tablet by mouth every 6 (six) hours as needed for Pain.  Qty: 28 tablet, Refills: 0    Comments: This prescription in the attached four other prescriptions are for postoperative use for the patient's scheduled left total shoulder arthroplasty on Wednesday April 3, 2024.  This is for the meds to bed program.  Associated Diagnoses: Primary osteoarthritis of left shoulder           Discharge Procedure Orders   Diet Adult Regular     Leave dressing on - Keep it clean, dry, and intact until clinic visit     Activity as tolerated

## 2024-04-03 NOTE — TRANSFER OF CARE
"Anesthesia Transfer of Care Note    Patient: Shree Heredia    Procedure(s) Performed: Procedure(s) (LRB):  ARTHROPLASTY, SHOULDER, LEFT (Left)    Patient location: PACU    Anesthesia Type: general    Transport from OR: Transported from OR on room air with adequate spontaneous ventilation    Post pain: adequate analgesia    Post assessment: no apparent anesthetic complications    Post vital signs: stable    Level of consciousness: awake and alert    Nausea/Vomiting: no nausea/vomiting    Complications: none    Transfer of care protocol was followed      Last vitals: Visit Vitals  BP (!) 151/88 (BP Location: Right arm, Patient Position: Sitting)   Pulse 65   Temp 36.8 °C (98.3 °F) (Oral)   Resp 16   Ht 5' 6" (1.676 m)   Wt 74.8 kg (165 lb)   BMI 26.63 kg/m²     "

## 2024-04-03 NOTE — DISCHARGE INSTRUCTIONS
No driving, operating heavy machinery or signing legal documents x 24 hours  No drinking alcohol x 24 hours or while taking pain medication  You should not be driving while taking pain medication  Wear sling until instructed by md   Do Not submerge wound in a tub, any pools of water or swimming pools until wound is completely healed  Keep an eye on wound- edges should be pink and well approximated-  the wound should not be red and inflamed.  Wound edges should not be .   Your wound should not be draining anything green, yellow or foul smelling- for these call the MD  You should not be running a temp greater than 101     keep follow up appt  Bring pictures to follow up appt  Follow block instructions  Ice x 24 hours  20 min on 20 min off

## 2024-04-03 NOTE — ANESTHESIA PREPROCEDURE EVALUATION
04/03/2024  Shree Heredia is a 74 y.o., male.      Pre-op Assessment    I have reviewed the Patient Summary Reports.     I have reviewed the Nursing Notes. I have reviewed the NPO Status.   I have reviewed the Medications.     Review of Systems  Anesthesia Hx:  No problems with previous Anesthesia             Denies Family Hx of Anesthesia complications.   Personal Hx of Anesthesia complications, Post-Operative Nausea/Vomiting                    Social:  Former Smoker       Hematology/Oncology:  Hematology Normal                       --  Cancer in past history:                     EENT/Dental:  EENT/Dental Normal           Cardiovascular:                hyperlipidemia   ECG has been reviewed.                          Pulmonary:    Asthma                    Renal/:  Renal/ Normal                 Hepatic/GI:  Hepatic/GI Normal                 Musculoskeletal:  Arthritis          Spine Disorders: cervical Disc disease and Degenerative disease           Neurological:      Headaches                                 Endocrine:  Endocrine Normal            Psych:  Psychiatric Normal                    Physical Exam  General: Well nourished, Cooperative, Alert and Oriented    Airway:  Mallampati: II   Mouth Opening: Normal  TM Distance: Normal  Tongue: Normal  Neck ROM: Normal ROM    Dental:  Intact    Chest/Lungs:  Clear to auscultation    Heart:  Rate: Normal  Rhythm: Regular Rhythm  Sounds: Normal        Anesthesia Plan  Type of Anesthesia, risks & benefits discussed:    Anesthesia Type: Gen ETT  Intra-op Monitoring Plan: Standard ASA Monitors  Post Op Pain Control Plan: multimodal analgesia and peripheral nerve block  Induction:  IV  Airway Plan: Video and Direct  Informed Consent: Informed consent signed with the Patient and all parties understand the risks and agree with anesthesia plan.  All  questions answered.   ASA Score: 2  Anesthesia Plan Notes: Pepcid, zofran, decadron, benadryl    Ready For Surgery From Anesthesia Perspective.     .

## 2024-04-03 NOTE — ANESTHESIA POSTPROCEDURE EVALUATION
Anesthesia Post Evaluation    Patient: Shree Heredia    Procedure(s) Performed: Procedure(s) (LRB):  ARTHROPLASTY, SHOULDER, LEFT (Left)    Final Anesthesia Type: general      Patient location during evaluation: PACU  Patient participation: Yes- Able to Participate  Level of consciousness: awake and alert  Post-procedure vital signs: reviewed and stable  Pain management: adequate  Airway patency: patent    PONV status at discharge: No PONV  Anesthetic complications: no      Cardiovascular status: hemodynamically stable  Respiratory status: unassisted, spontaneous ventilation and room air  Hydration status: euvolemic  Follow-up not needed.              Vitals Value Taken Time   /92 04/03/24 1130   Temp  04/03/24 1143   Pulse 72 04/03/24 1141   Resp 18 04/03/24 1141   SpO2 95 % 04/03/24 1141   Vitals shown include unvalidated device data.      No case tracking events are documented in the log.      Pain/Viridiana Score: Viridiana Score: 10 (4/3/2024 11:30 AM)

## 2024-04-03 NOTE — ANESTHESIA PROCEDURE NOTES
Peripheral Block    Patient location during procedure: pre-op   Block not for primary anesthetic.  Reason for block: at surgeon's request and post-op pain management   Post-op Pain Location: left shoulder   Start time: 4/3/2024 7:17 AM  Timeout: 4/3/2024 7:17 AM   End time: 4/3/2024 7:17 AM    Staffing  Authorizing Provider: Mich Holt MD  Performing Provider: Mich Holt MD    Staffing  Performed by: Mich Holt MD  Authorized by: Mich Holt MD    Preanesthetic Checklist  Completed: patient identified, IV checked, site marked, risks and benefits discussed, surgical consent, monitors and equipment checked, pre-op evaluation and timeout performed  Peripheral Block  Patient position: sitting  Prep: ChloraPrep  Patient monitoring: heart rate, cardiac monitor, continuous pulse ox, continuous capnometry and frequent blood pressure checks  Block type: interscalene  Laterality: left  Injection technique: single shot  Needle  Needle type: Echogenic   Needle gauge: 22 G  Needle length: 3.5 in  Needle localization: anatomical landmarks, ultrasound guidance, paresthesias and nerve stimulator   -ultrasound image captured on disc.  Assessment  Injection assessment: negative aspiration, negative parasthesia and local visualized surrounding nerve  Paresthesia pain: none  Heart rate change: no  Slow fractionated injection: yes  Pain Tolerance: comfortable throughout block and no complaints  Medications:    Medications: bupivacaine (pf) (MARCAINE) injection 0.5% - Perineural   10 mL - 4/3/2024 7:18:00 AM  BUPivacaine liposome (PF) 1.3 % (13.3 mg/mL) suspension - Injection   10 mL - 4/3/2024 7:18:00 AM    Additional Notes  VSS.  [facility] RN monitoring vitals throughout procedure.  Patient tolerated procedure well.

## 2024-04-03 NOTE — H&P
CC/Indication for Procedure: 74 y.o. male with Primary osteoarthritis of left shoulder [M19.012].    Patient scheduled for SC RECONSTR TOTAL SHOULDER IMPLANT [[id number]] (ARTHROPLASTY, SHOULDER, LEFT)  SC RECONSTR TOTAL SHOULDER IMPLANT [[id number]].    Past Medical History:   Diagnosis Date    Allergic rhinitis, seasonal     Asthma     Back pain     DDD (degenerative disc disease), cervical     Digestive disorder     Gout attack     Hyperlipidemia     PONV (postoperative nausea and vomiting)     Prostate cancer     Prostate cancer 2019     Past Surgical History:   Procedure Laterality Date    BACK SURGERY  2016    CYSTOSCOPY N/A 2021    Procedure: CYSTOSCOPY;  Surgeon: Marek Guidry Jr., MD;  [facility]: [facility];  Service: Urology;  Laterality: N/A;    INGUINAL HERNIA REPAIR       Family History   Problem Relation Age of Onset    Hypertension Mother     Diabetes Mother     Hyperlipidemia Mother     Heart disease Mother     Glaucoma Mother     Glaucoma Father     Hyperlipidemia Father     Diabetes Father     Heart disease Sister     Diabetes Sister     Hypertension Sister     Heart disease Maternal Aunt     Heart disease Maternal Uncle      Social History     Socioeconomic History    Marital status:    Occupational History     Employer: [employer]   Tobacco Use    Smoking status: Former     Current packs/day: 0.00     Types: Cigarettes     Quit date: 2010     Years since quittin.2    Smokeless tobacco: Never   Substance and Sexual Activity    Alcohol use: Yes     Comment: soc    Drug use: Never    Sexual activity: Yes     Partners: Female     Social Determinants of Health     Financial Resource Strain: Low Risk  (2022)    Overall Financial Resource Strain (CARDIA)     Difficulty of Paying Living Expenses: Not hard at all   Food Insecurity: No Food Insecurity (2022)    Hunger Vital Sign     Worried About Running Out of Food in the Last Year: Never true     Ran Out of Food in the Last Year:  Never true   Transportation Needs: No Transportation Needs (8/2/2022)    PRAPARE - Transportation     Lack of Transportation (Medical): No     Lack of Transportation (Non-Medical): No   Physical Activity: Sufficiently Active (8/2/2022)    Exercise Vital Sign     Days of Exercise per Week: 5 days     Minutes of Exercise per Session: 60 min   Stress: No Stress Concern Present (8/2/2022)    [employer] - Occupational Stress Questionnaire     Feeling of Stress : Only a little   Social Connections: Moderately Integrated (8/2/2022)    Social Connection and Isolation Panel [NHANES]     Frequency of Communication with Friends and Family: More than three times a week     Frequency of Social Gatherings with Friends and Family: More than three times a week     Attends Islam Services: More than 4 times per year     Active Member of Clubs or Organizations: No     Attends Club or Organization Meetings: Never     Marital Status:    Housing Stability: Low Risk  (8/2/2022)    Housing Stability Vital Sign     Unable to Pay for Housing in the Last Year: No     Number of Places Lived in the Last Year: 1     Unstable Housing in the Last Year: No       Review of patient's allergies indicates:   Allergen Reactions    Grass pollen-ed grass standard Other (See Comments)     Triggers his sinuses    Permethrin      Pt is not sure of this allergy      Statins-hmg-coa reductase inhibitors Other (See Comments)     Myopathy         Current Facility-Administered Medications:     cefazolin (ANCEF) 2 gram in dextrose 5% 50 mL IVPB (premix), 2 g, Intravenous, On Call Procedure, Germán Carty MD    tranexamic acid in NaCl,iso-os IVPB 1,000 mg, 1,000 mg, Intravenous, On Call Procedure, Germán Carty MD    Facility-Administered Medications Ordered in Other Encounters:     triamcinolone acetonide injection 40 mg, 40 mg, Intra-articular, , Andrews Conde PA-C, 40 mg at 07/06/23 1015    ROS:    Denies  chest pain or palpitations  Denies shortness of breath  Denies fevers or chills  Denies chest pain  Denies abdominal pain    PE:    General Appearance: Well nourished  Orientation: Oriented to time, place, person  Mental Status: Alert  Heart: RRR  Lungs: CTA  Abdomen: Soft and non-tender    Anesthesia/Surgery risks, benefits and alternative options discussed and understood by patient/family.    This note was created using Dragon voice recognition software that occasionally misinterpreted phrases or words.

## 2024-04-04 ENCOUNTER — TELEPHONE (OUTPATIENT)
Dept: ORTHOPEDICS | Facility: CLINIC | Age: 74
End: 2024-04-04

## 2024-04-04 ENCOUNTER — TELEPHONE (OUTPATIENT)
Dept: ANESTHESIOLOGY | Facility: HOSPITAL | Age: 74
End: 2024-04-04

## 2024-04-04 ENCOUNTER — OFFICE VISIT (OUTPATIENT)
Dept: ORTHOPEDICS | Facility: CLINIC | Age: 74
End: 2024-04-04
Payer: MEDICARE

## 2024-04-04 VITALS — BODY MASS INDEX: 26.5 KG/M2 | WEIGHT: 164.88 LBS | HEIGHT: 66 IN

## 2024-04-04 DIAGNOSIS — Z96.612 STATUS POST SHOULDER REPLACEMENT, LEFT: Primary | ICD-10-CM

## 2024-04-04 PROCEDURE — 99024 POSTOP FOLLOW-UP VISIT: CPT | Mod: S$GLB,POP,, | Performed by: PHYSICIAN ASSISTANT

## 2024-04-04 NOTE — TELEPHONE ENCOUNTER
----- Message from Fidelina Vasquez sent at 4/4/2024 11:05 AM CDT -----  [phone]-he was here this morning and forgot to ask if he still needs to use a ice pack, please call

## 2024-04-04 NOTE — TELEPHONE ENCOUNTER
Today is postop day #1 status post shoulder surgery.  Patient had a single shot interscalene block performed with Exparel for postoperative pain control.  Today the patient reports no significant pain in the shoulder since surgery. Patient has no respiratory symptoms or signs of infection. He reports full return of feeling and motor function in the arm since this morning.

## 2024-04-08 ENCOUNTER — TELEPHONE (OUTPATIENT)
Dept: ORTHOPEDICS | Facility: CLINIC | Age: 74
End: 2024-04-08

## 2024-04-08 NOTE — TELEPHONE ENCOUNTER
----- Message from Mora Lauren sent at 4/8/2024 11:31 AM CDT -----  Pt wants  to  know  can he  take  the  bandage  off  take a shower and  put  a new  bandage on. Pt would  like a call  back.

## 2024-04-17 ENCOUNTER — OFFICE VISIT (OUTPATIENT)
Dept: ORTHOPEDICS | Facility: CLINIC | Age: 74
End: 2024-04-17
Payer: MEDICARE

## 2024-04-17 VITALS — BODY MASS INDEX: 26.5 KG/M2 | WEIGHT: 164.88 LBS | HEIGHT: 66 IN

## 2024-04-17 DIAGNOSIS — Z96.612 STATUS POST SHOULDER REPLACEMENT, LEFT: Primary | ICD-10-CM

## 2024-04-17 PROCEDURE — 99024 POSTOP FOLLOW-UP VISIT: CPT | Mod: S$GLB,POP,, | Performed by: PHYSICIAN ASSISTANT

## 2024-04-17 RX ORDER — TADALAFIL 5 MG/1
5 TABLET ORAL
COMMUNITY
Start: 2024-04-01

## 2024-04-17 NOTE — PROGRESS NOTES
[facility]  [address] [facility] [address]  [place] [address]  Phone: [phone]   [phone]    Patient's PCP:Brendan Frank Jr., MD  Referring Provider: No ref. provider found    POST-OP Note:    Subjective:        Chief Complaint:   Chief Complaint   Patient presents with    Left Shoulder - Post-op Evaluation     F/u XR and Suture removal PO left TSA 4.3.24       Past Medical History:   Diagnosis Date    Allergic rhinitis, seasonal     Asthma     Back pain     DDD (degenerative disc disease), cervical     Digestive disorder     Gout attack     Hyperlipidemia     PONV (postoperative nausea and vomiting)     Prostate cancer     Prostate cancer 08/01/2019       Past Surgical History:   Procedure Laterality Date    ARTHROPLASTY OF SHOULDER Left 4/3/2024    Procedure: ARTHROPLASTY, SHOULDER;  Surgeon: Germán Carty MD;  Location: [facility] OR;  Service: Orthopedics;  Laterality: Left;  Sagar Arriaza- RICARDO    BACK SURGERY  01/29/2016    CYSTOSCOPY N/A 12/1/2021    Procedure: CYSTOSCOPY;  Surgeon: Marek Guidry Jr., MD;  Location: [facility] OR;  Service: Urology;  Laterality: N/A;    INGUINAL HERNIA REPAIR         Current Outpatient Medications   Medication Sig Dispense Refill    tadalafiL (CIALIS) 5 MG tablet Take 5 mg by mouth.      allopurinoL (ZYLOPRIM) 100 MG tablet TAKE 2 TABLETS BY MOUTH EVERY  tablet 3    aspirin (ECOTRIN) 81 MG EC tablet Take 1 tablet (81 mg total) by mouth every 12 (twelve) hours. 60 tablet 0    celecoxib (CELEBREX) 200 MG capsule Take 1 capsule (200 mg total) by mouth 2 (two) times daily. 60 capsule 0    cetirizine (ZYRTEC) 10 MG tablet Take 10 mg by mouth every evening.      colchicine (COLCRYS) 0.6 mg tablet TAKE 1 TABLET BY MOUTH EVERY DAY 90 tablet 3    docusate sodium (COLACE) 100 MG capsule Take 1 capsule (100 mg total) by mouth 2 (two) times daily. 60 capsule 0    ezetimibe (ZETIA) 10 mg tablet TAKE 1 TABLET BY MOUTH EVERY DAY 90 tablet 3    fluticasone propionate  (FLONASE) 50 mcg/actuation nasal spray 1 spray (50 mcg total) by Each Nostril route once daily. 11.1 mL 0    gabapentin (NEURONTIN) 300 MG capsule Take 1 capsule (300 mg total) by mouth 2 (two) times daily. 60 capsule 0    omega-3 acid ethyl esters (LOVAZA) 1 gram capsule TAKE 2 CAPSULES BY MOUTH TWICE A  capsule 3    oxyCODONE-acetaminophen (PERCOCET) 7.5-325 mg per tablet Take 1 tablet by mouth every 6 (six) hours as needed for Pain. 28 tablet 0    tamsulosin (FLOMAX) 0.4 mg Cap Take 1 capsule (0.4 mg total) by mouth once daily. 90 capsule 3    VYZULTA 0.024 % Drop Place 1 drop into both eyes once daily.       No current facility-administered medications for this visit.     Facility-Administered Medications Ordered in Other Visits   Medication Dose Route Frequency Provider Last Rate Last Admin    triamcinolone acetonide injection 40 mg  40 mg Intra-articular  Andrews Conde PA-C   40 mg at 23 1015       Review of patient's allergies indicates:   Allergen Reactions    Grass pollen-ed grass standard Other (See Comments)     Triggers his sinuses    Permethrin      Pt is not sure of this allergy      Statins-hmg-coa reductase inhibitors Other (See Comments)     Myopathy       Family History   Problem Relation Name Age of Onset    Hypertension Mother      Diabetes Mother      Hyperlipidemia Mother      Heart disease Mother      Glaucoma Mother      Glaucoma Father      Hyperlipidemia Father      Diabetes Father      Heart disease Sister      Diabetes Sister      Hypertension Sister      Heart disease Maternal Aunt      Heart disease Maternal Uncle         Social History     Socioeconomic History    Marital status:    Occupational History     Employer: I   Tobacco Use    Smoking status: Former     Current packs/day: 0.00     Types: Cigarettes     Quit date: 2010     Years since quittin.3    Smokeless tobacco: Never   Substance and Sexual Activity    Alcohol use: Yes     Comment:  soc    Drug use: Never    Sexual activity: Yes     Partners: Female     Social Determinants of Health     Financial Resource Strain: Low Risk  (8/2/2022)    Overall Financial Resource Strain (CARDIA)     Difficulty of Paying Living Expenses: Not hard at all   Food Insecurity: No Food Insecurity (8/2/2022)    Hunger Vital Sign     Worried About Running Out of Food in the Last Year: Never true     Ran Out of Food in the Last Year: Never true   Transportation Needs: No Transportation Needs (8/2/2022)    PRAPARE - Transportation     Lack of Transportation (Medical): No     Lack of Transportation (Non-Medical): No   Physical Activity: Sufficiently Active (8/2/2022)    Exercise Vital Sign     Days of Exercise per Week: 5 days     Minutes of Exercise per Session: 60 min   Stress: No Stress Concern Present (8/2/2022)    [employer] - Occupational Stress Questionnaire     Feeling of Stress : Only a little   Social Connections: Moderately Integrated (8/2/2022)    Social Connection and Isolation Panel [NHANES]     Frequency of Communication with Friends and Family: More than three times a week     Frequency of Social Gatherings with Friends and Family: More than three times a week     Attends Zoroastrian Services: More than 4 times per year     Active Member of Clubs or Organizations: No     Attends Club or Organization Meetings: Never     Marital Status:    Housing Stability: Low Risk  (8/2/2022)    Housing Stability Vital Sign     Unable to Pay for Housing in the Last Year: No     Number of Places Lived in the Last Year: 1     Unstable Housing in the Last Year: No       History of present illness:  74-year-old male, returns to clinic today status post left total shoulder arthroplasty April 3rd. Here today for routine follow-up.       Review of Systems:    Musculoskeletal:  See HPI       Objective:        Physical Examination:    Vital Signs: There were no vitals filed for this visit.    Body  mass index is 26.62 kg/m².    This a well-developed, well nourished patient in no acute distress.  They are alert and oriented and cooperative to examination.        Examination of the left shoulder, skin is dry and intact, no erythema or ecchymosis, no signs symptoms of infection.  No evidence of wound failure dehiscence.  The remainder of his surgical dressings and sutures were removed today.    Pertinent New Results:     XRAY Report / Interpretation:   AP and lateral views of the left shoulder taken today in the office demonstrate left total shoulder arthroplasty to be in appropriate position without evidence of hardware failure or loosening.  Visualized soft tissues are unremarkable.     Assessment:       1. Status post shoulder replacement, left      Plan:     Status post shoulder replacement, left  -     X-Ray Shoulder 2 or More Views Left        Follow up for 4 wk f/u, Tukim/Thfatoumata with Dr. Carty, //XR// s/p Left TSA 4/3/24.    Stable status post left shoulder replacement surgery 2 weeks ago.  His wounds are all healing as expected.  He has been out of his sling except for at night while sleeping.  He has begun physical therapy for strengthening and range of motion.  We will check him back in 1 month for his progress.      PAMELA Santamaria, PASonamC    This note was created using [employer] voice recognition software that occasionally misinterprets words or phrases.

## 2024-04-19 ENCOUNTER — TELEPHONE (OUTPATIENT)
Dept: ADMINISTRATIVE | Facility: CLINIC | Age: 74
End: 2024-04-19
Payer: MEDICARE

## 2024-04-22 ENCOUNTER — OFFICE VISIT (OUTPATIENT)
Dept: FAMILY MEDICINE | Facility: CLINIC | Age: 74
End: 2024-04-22
Payer: MEDICARE

## 2024-04-22 VITALS
HEART RATE: 73 BPM | SYSTOLIC BLOOD PRESSURE: 126 MMHG | WEIGHT: 168.31 LBS | DIASTOLIC BLOOD PRESSURE: 64 MMHG | HEIGHT: 66 IN | TEMPERATURE: 98 F | OXYGEN SATURATION: 97 % | BODY MASS INDEX: 27.05 KG/M2

## 2024-04-22 DIAGNOSIS — Z78.9 STATIN INTOLERANCE: ICD-10-CM

## 2024-04-22 DIAGNOSIS — Z74.09 OTHER REDUCED MOBILITY: ICD-10-CM

## 2024-04-22 DIAGNOSIS — I70.0 ABDOMINAL AORTIC ATHEROSCLEROSIS: ICD-10-CM

## 2024-04-22 DIAGNOSIS — Z00.00 ENCOUNTER FOR PREVENTIVE HEALTH EXAMINATION: Primary | ICD-10-CM

## 2024-04-22 DIAGNOSIS — C61 PROSTATE CANCER: ICD-10-CM

## 2024-04-22 DIAGNOSIS — J30.89 SEASONAL ALLERGIC RHINITIS DUE TO OTHER ALLERGIC TRIGGER: ICD-10-CM

## 2024-04-22 DIAGNOSIS — E78.2 MIXED HYPERLIPIDEMIA: ICD-10-CM

## 2024-04-22 PROCEDURE — G0439 PPPS, SUBSEQ VISIT: HCPCS | Mod: ,,, | Performed by: PHYSICIAN ASSISTANT

## 2024-04-22 PROCEDURE — 99999 PR PBB SHADOW E&M-EST. PATIENT-LVL IV: CPT | Mod: PBBFAC,,, | Performed by: PHYSICIAN ASSISTANT

## 2024-04-22 PROCEDURE — 99214 OFFICE O/P EST MOD 30 MIN: CPT | Mod: PBBFAC,PN | Performed by: PHYSICIAN ASSISTANT

## 2024-04-22 NOTE — PROGRESS NOTES
"  Shree Heredia presented for a follow-up Medicare AWV today. The following components were reviewed and updated:    Medical history  Family History  Social history  Allergies and Current Medications  Health Risk Assessment  Health Maintenance  Care Team    **See Completed Assessments for Annual Wellness visit with in the encounter summary    The following assessments were completed:  Depression Screening  Cognitive function Screening  Timed Get Up Test  Whisper Test      Opioid documentation:      Patient does not have a current opioid prescription.          Vitals:    04/22/24 1011   BP: 126/64   BP Location: Right arm   Patient Position: Sitting   BP Method: Medium (Manual)   Pulse: 73   Temp: 98.3 °F (36.8 °C)   TempSrc: Oral   SpO2: 97%   Weight: 76.4 kg (168 lb 5.1 oz)   Height: 5' 6" (1.676 m)     Body mass index is 27.17 kg/m².       Physical Exam  Constitutional:       Appearance: Normal appearance.   HENT:      Head: Normocephalic and atraumatic.   Pulmonary:      Effort: Pulmonary effort is normal.      Breath sounds: Normal breath sounds.   Neurological:      General: No focal deficit present.      Mental Status: He is alert and oriented to person, place, and time. Mental status is at baseline.   Psychiatric:         Mood and Affect: Mood normal.         Behavior: Behavior normal.         Thought Content: Thought content normal.         Judgment: Judgment normal.            Diagnoses and health risks identified today and associated recommendations/orders:  Shree was seen today for health risk assessment.    Diagnoses and all orders for this visit:    Encounter for preventive health examination    Abdominal aortic atherosclerosis  Comments:  Stable, continue to monitor    Prostate cancer  Comments:  In remission.  Patient has pellets installed in prostate.  Continue to be followed by Urology    Seasonal allergic rhinitis due to other allergic trigger  Comments:  Controlled, continue current " regimen    Mixed hyperlipidemia  Comments:  Stable, continue regimen and follow-up with new PCP    Statin intolerance  Comments:  Stable, continue to monitor    Other reduced mobility         Provided Shree with a 5-10 year written screening schedule and personal prevention plan. Recommendations were developed using the USPSTF age appropriate recommendations. Education, counseling, and referrals were provided as needed.  After Visit Summary printed and given to patient which includes a list of additional screenings\tests needed.    No follow-ups on file.      KAYCEE Vincent    I offered to discuss advanced care planning, including how to pick a person who would make decisions for you if you were unable to make them for yourself, called a health care power of , and what kind of decisions you might make such as use of life sustaining treatments such as ventilators and tube feeding when faced with a life limiting illness recorded on a living will that they will need to know. (How you want to be cared for as you near the end of your natural life)     X Patient is interested in learning more about how to make advanced directives.  I provided them paperwork and offered to discuss this with them.

## 2024-04-22 NOTE — PATIENT INSTRUCTIONS
Counseling and Referral of Other Preventative  (Italic type indicates deductible and co-insurance are waived)    Patient Name: Shree Heredia  Today's Date: 4/22/2024    Health Maintenance       Date Due Completion Date    RSV Vaccine (Age 60+ and Pregnant patients) (1 - 1-dose 60+ series) Never done ---    COVID-19 Vaccine (9 - 2023-24 season) 03/19/2024 1/23/2024    Colorectal Cancer Screening 03/13/2028 3/13/2018    Override on 3/11/2013: Done (Dr Pinto Nadege   report sent to scanning   kb)    Override on 11/14/2007: Done    Lipid Panel 03/27/2029 3/27/2024    TETANUS VACCINE 09/09/2031 9/9/2021        No orders of the defined types were placed in this encounter.      The following information is provided to all patients.  This information is to help you find resources for any of the problems found today that may be affecting your health:                  Living healthy guide: www.[facility].[place].[facility]      Understanding Diabetes: www.diabetes.org      Eating healthy: www.cdc.gov/healthyweight      CDC home safety checklist: www.cdc.gov/steadi/patient.html      Agency on Aging: www.goea.[place].[facility]      Alcoholics anonymous (AA): www.aa.org      Physical Activity: www.[place]ella.nih.gov/zz6dzud      Tobacco use: www.quitwithusla.org

## 2024-05-02 ENCOUNTER — OFFICE VISIT (OUTPATIENT)
Dept: URGENT CARE | Facility: CLINIC | Age: 74
End: 2024-05-02
Payer: MEDICARE

## 2024-05-02 VITALS
DIASTOLIC BLOOD PRESSURE: 79 MMHG | HEART RATE: 68 BPM | OXYGEN SATURATION: 97 % | WEIGHT: 168 LBS | SYSTOLIC BLOOD PRESSURE: 123 MMHG | RESPIRATION RATE: 15 BRPM | BODY MASS INDEX: 27 KG/M2 | HEIGHT: 66 IN | TEMPERATURE: 98 F

## 2024-05-02 DIAGNOSIS — J01.41 ACUTE RECURRENT PANSINUSITIS: Primary | ICD-10-CM

## 2024-05-02 PROCEDURE — 99213 OFFICE O/P EST LOW 20 MIN: CPT | Mod: 25,S$GLB,, | Performed by: STUDENT IN AN ORGANIZED HEALTH CARE EDUCATION/TRAINING PROGRAM

## 2024-05-02 PROCEDURE — 96372 THER/PROPH/DIAG INJ SC/IM: CPT | Mod: S$GLB,,, | Performed by: EMERGENCY MEDICINE

## 2024-05-02 RX ORDER — LORATADINE PSEUDOEPHEDRINE SULFATE 10; 240 MG/1; MG/1
1 TABLET, EXTENDED RELEASE ORAL DAILY
COMMUNITY
Start: 2024-05-02 | End: 2024-05-12

## 2024-05-02 RX ORDER — AZELASTINE 1 MG/ML
1 SPRAY, METERED NASAL 2 TIMES DAILY
Qty: 30 ML | Refills: 0 | Status: SHIPPED | OUTPATIENT
Start: 2024-05-02 | End: 2025-05-02

## 2024-05-02 RX ORDER — AMOXICILLIN AND CLAVULANATE POTASSIUM 875; 125 MG/1; MG/1
1 TABLET, FILM COATED ORAL 2 TIMES DAILY
Qty: 20 TABLET | Refills: 0 | Status: SHIPPED | OUTPATIENT
Start: 2024-05-02 | End: 2024-05-12

## 2024-05-02 RX ORDER — DEXAMETHASONE SODIUM PHOSPHATE 4 MG/ML
8 INJECTION, SOLUTION INTRA-ARTICULAR; INTRALESIONAL; INTRAMUSCULAR; INTRAVENOUS; SOFT TISSUE
Status: COMPLETED | OUTPATIENT
Start: 2024-05-02 | End: 2024-05-02

## 2024-05-02 RX ADMIN — DEXAMETHASONE SODIUM PHOSPHATE 8 MG: 4 INJECTION, SOLUTION INTRA-ARTICULAR; INTRALESIONAL; INTRAMUSCULAR; INTRAVENOUS; SOFT TISSUE at 04:05

## 2024-05-02 NOTE — PROGRESS NOTES
"Subjective:      Patient ID: Shree Heredia is a 74 y.o. male.    Vitals:  height is 5' 6" (1.676 m) and weight is 76.2 kg (168 lb). His temperature is 98.4 °F (36.9 °C). His blood pressure is 123/79 and his pulse is 68. His respiration is 15 and oxygen saturation is 97%.     Chief Complaint: Sinus Problem    Patient is a 74-year-old male with a past medical history of chronic sinusitis, migraine, hyperlipidemia, prostate cancer, asthma, gout, and DDD who presents to clinic for evaluation of possible sinus infection.  Patient reports symptoms for approximately 3 weeks now.  Patient reports he is vaccinated.  Patient reports no recent or known sick exposures.  Patient reports history of chronic sinusitis.  Patient reports last a couple of months ago where he was on doxycycline.  Patient reports feels identical to that.  Patient reports he has taken over-the-counter nasal sprays and antihistamines with no significant relief to symptoms.  Patient requesting steroid injection.        Constitution: Positive for fatigue. Negative for chills, sweating and fever.   HENT:  Positive for congestion, postnasal drip, sinus pain and sinus pressure. Negative for ear pain and sore throat.    Neck: neck negative.   Cardiovascular: Negative.  Negative for chest pain and palpitations.   Eyes: Negative.    Respiratory: Negative.  Negative for chest tightness, cough and shortness of breath.    Gastrointestinal: Negative.  Negative for abdominal pain, nausea and vomiting.   Endocrine: negative.   Genitourinary: Negative.    Musculoskeletal: Negative.  Negative for muscle ache.   Skin: Negative.  Negative for color change, pale, rash and erythema.   Allergic/Immunologic: Positive for recurrent sinus infections.   Neurological:  Positive for headaches. Negative for dizziness, light-headedness, passing out, disorientation and altered mental status.   Hematologic/Lymphatic: Negative.    Psychiatric/Behavioral: Negative.  Negative for " altered mental status, disorientation and confusion.       Objective:     Physical Exam   Constitutional: He is oriented to person, place, and time. He appears well-developed. He is cooperative.  Non-toxic appearance. He does not appear ill. No distress.   HENT:   Head: Normocephalic and atraumatic.   Ears:   Right Ear: Hearing, tympanic membrane, external ear and ear canal normal.   Left Ear: Hearing, tympanic membrane, external ear and ear canal normal.   Nose: Congestion present. No mucosal edema, rhinorrhea or nasal deformity. No epistaxis. Right sinus exhibits maxillary sinus tenderness and frontal sinus tenderness. Left sinus exhibits maxillary sinus tenderness and frontal sinus tenderness.   Mouth/Throat: Uvula is midline, oropharynx is clear and moist and mucous membranes are normal. Mucous membranes are moist. No trismus in the jaw. Normal dentition. No uvula swelling. No oropharyngeal exudate or posterior oropharyngeal erythema. Oropharynx is clear.   Eyes: Conjunctivae and lids are normal. Pupils are equal, round, and reactive to light. Right eye exhibits no discharge. Left eye exhibits no discharge. No scleral icterus.   Neck: Trachea normal and phonation normal. Neck supple. No neck rigidity present.   Cardiovascular: Normal rate, regular rhythm and normal pulses.   Pulmonary/Chest: Effort normal and breath sounds normal. No respiratory distress (Unlabored.  Equal rise and fall of chest.  Able speak in full complete sentences.  No adventitious breath sounds noted.). He has no wheezes. He has no rhonchi. He has no rales.   Abdominal: Normal appearance and bowel sounds are normal. He exhibits no distension. Soft. There is no abdominal tenderness.   Musculoskeletal: Normal range of motion.         General: Normal range of motion.      Cervical back: He exhibits no tenderness.   Lymphadenopathy:     He has no cervical adenopathy.   Neurological: He is alert and oriented to person, place, and time. He  exhibits normal muscle tone.   Skin: Skin is warm, dry, intact, not diaphoretic, not pale and no rash. Capillary refill takes 2 to 3 seconds. No erythema   Psychiatric: His speech is normal and behavior is normal. Judgment and thought content normal.   Nursing note and vitals reviewed.      Assessment:     1. Acute recurrent pansinusitis        Plan:       Acute recurrent pansinusitis    Other orders  -     dexAMETHasone injection 8 mg  -     amoxicillin-clavulanate 875-125mg (AUGMENTIN) 875-125 mg per tablet; Take 1 tablet by mouth 2 (two) times daily. for 10 days  Dispense: 20 tablet; Refill: 0  -     loratadine-pseudoephedrine  mg (CLARITIN-D 24 HOUR)  mg per 24 hr tablet; Take 1 tablet by mouth once daily. for 10 days  -     azelastine (ASTELIN) 137 mcg (0.1 %) nasal spray; 1 spray (137 mcg total) by Nasal route 2 (two) times daily.  Dispense: 30 mL; Refill: 0                Labs:  Patient refused point of care testing.    Decadron 8 mg IM in clinic.  Patient tolerated well.  No complications noted.    Take medications as prescribed.    Tylenol/Motrin per package instructions for any pain or fever.    Nasal saline flushes or irrigation as directed.    Assure adequate hydration.    Follow-up with PCP in 1-2 days.    Return to clinic as needed.    To ED for any new or acutely worsening symptoms.    Patient in agreement with plan of care.    DISCLAIMER: Please note that my documentation in this Electronic Healthcare Record was produced using speech recognition software and therefore may contain errors related to that software system.These could include grammar, punctuation and spelling errors or the inclusion/exclusion of phrases that were not intended. Garbled syntax, mangled pronouns, and other bizarre constructions may be attributed to that software system.

## 2024-05-14 ENCOUNTER — OFFICE VISIT (OUTPATIENT)
Dept: ORTHOPEDICS | Facility: CLINIC | Age: 74
End: 2024-05-14
Payer: MEDICARE

## 2024-05-14 VITALS — WEIGHT: 168 LBS | BODY MASS INDEX: 27 KG/M2 | HEIGHT: 66 IN

## 2024-05-14 DIAGNOSIS — Z96.612 STATUS POST SHOULDER REPLACEMENT, LEFT: Primary | ICD-10-CM

## 2024-05-14 PROCEDURE — 99024 POSTOP FOLLOW-UP VISIT: CPT | Mod: S$GLB,POP,, | Performed by: ORTHOPAEDIC SURGERY

## 2024-05-14 NOTE — PROGRESS NOTES
[facility]    Subjective:     Chief Complaint:   Chief Complaint   Patient presents with    Left Shoulder - Pain     F/u XR left TSA 4.3.24 doing well current pain level at rest 1-2/10 increased pain with PT and pressure while sleeping. Notes ROM improvement after PT       Past Medical History:   Diagnosis Date    Allergic rhinitis, seasonal     Asthma     Back pain     DDD (degenerative disc disease), cervical     Digestive disorder     Gout attack     Hyperlipidemia     PONV (postoperative nausea and vomiting)     Prostate cancer     Prostate cancer 08/01/2019       Past Surgical History:   Procedure Laterality Date    ARTHROPLASTY OF SHOULDER Left 4/3/2024    Procedure: ARTHROPLASTY, SHOULDER;  Surgeon: Germán Carty MD;  Location: [facility] OR;  Service: Orthopedics;  Laterality: Left;  Sagar Arriaza- RICADRO    BACK SURGERY  01/29/2016    CYSTOSCOPY N/A 12/1/2021    Procedure: CYSTOSCOPY;  Surgeon: Marek Guidry Jr., MD;  Location: [facility] OR;  Service: Urology;  Laterality: N/A;    INGUINAL HERNIA REPAIR         Current Outpatient Medications   Medication Sig    allopurinoL (ZYLOPRIM) 100 MG tablet TAKE 2 TABLETS BY MOUTH EVERY DAY    azelastine (ASTELIN) 137 mcg (0.1 %) nasal spray 1 spray (137 mcg total) by Nasal route 2 (two) times daily.    colchicine (COLCRYS) 0.6 mg tablet TAKE 1 TABLET BY MOUTH EVERY DAY    ezetimibe (ZETIA) 10 mg tablet TAKE 1 TABLET BY MOUTH EVERY DAY    omega-3 acid ethyl esters (LOVAZA) 1 gram capsule TAKE 2 CAPSULES BY MOUTH TWICE A DAY    tadalafiL (CIALIS) 5 MG tablet Take 5 mg by mouth.    tamsulosin (FLOMAX) 0.4 mg Cap Take 1 capsule (0.4 mg total) by mouth once daily.    VYZULTA 0.024 % Drop Place 1 drop into both eyes once daily.     No current facility-administered medications for this visit.       Review of patient's allergies indicates:   Allergen Reactions    Grass pollen-june grass standard Other (See Comments)     Triggers his sinuses    Permethrin      Pt is not sure  of this allergy      Statins-hmg-coa reductase inhibitors Other (See Comments)     Myopathy       Family History   Problem Relation Name Age of Onset    Hypertension Mother      Diabetes Mother      Hyperlipidemia Mother      Heart disease Mother      Glaucoma Mother      Glaucoma Father      Hyperlipidemia Father      Diabetes Father      Heart disease Sister      Diabetes Sister      Hypertension Sister      Cirrhosis Sister      No Known Problems Daughter      No Known Problems Daughter      No Known Problems Daughter      No Known Problems Daughter      No Known Problems Son      No Known Problems Son      Heart disease Maternal Aunt      Heart disease Maternal Uncle         Social History     Socioeconomic History    Marital status:    Occupational History     Employer: [employer]   Tobacco Use    Smoking status: Former     Current packs/day: 0.00     Types: Cigarettes     Quit date: 2010     Years since quittin.3    Smokeless tobacco: Never   Substance and Sexual Activity    Alcohol use: Yes     Comment: soc    Drug use: Never    Sexual activity: Yes     Partners: Female     Social Determinants of Health     Financial Resource Strain: Low Risk  (2024)    Overall Financial Resource Strain (CARDIA)     Difficulty of Paying Living Expenses: Not hard at all   Food Insecurity: No Food Insecurity (2024)    Hunger Vital Sign     Worried About Running Out of Food in the Last Year: Never true     Ran Out of Food in the Last Year: Never true   Transportation Needs: No Transportation Needs (2024)    PRAPARE - Transportation     Lack of Transportation (Medical): No     Lack of Transportation (Non-Medical): No   Physical Activity: Sufficiently Active (2024)    Exercise Vital Sign     Days of Exercise per Week: 5 days     Minutes of Exercise per Session: 60 min   Stress: No Stress Concern Present (2024)    [employer] [employer] - Occupational Stress Questionnaire      Feeling of Stress : Only a little   Housing Stability: Low Risk  (8/2/2022)    Housing Stability Vital Sign     Unable to Pay for Housing in the Last Year: No     Number of Places Lived in the Last Year: 1     Unstable Housing in the Last Year: No       History of present illness:  74-year-old male returns to clinic today status post left total shoulder arthroplasty, traditional done April 3rd.  He will be 6 weeks postop tomorrow.  He is doing well in terms of pain control and range of motion.  He continues with physical therapy.      Review of Systems:    Constitution: Negative for chills, fever, and sweats.  Negative for unexplained weight loss.    HENT:  Negative for headaches and blurry vision.    Cardiovascular:Negative for chest pain or irregular heart beat. Negative for hypertension.    Respiratory:  Negative for cough and shortness of breath.    Gastrointestinal: Negative for abdominal pain, heartburn, melena, nausea, and vomitting.    Genitourinary:  Negative bladder incontinence and dysuria.    Musculoskeletal:  See HPI for details.     Neurological: Negative for numbness.    Psychiatric/Behavioral: Negative for depression.  The patient is not nervous/anxious.      Endocrine: Negative for polyuria    Hematologic/Lymphatic: Negative for bleeding problem.  Does not bruise/bleed easily.    Skin: Negative for poor would healing and rash    Objective:      Physical Examination:    Vital Signs:  There were no vitals filed for this visit.    Body mass index is 27.11 kg/m².    This a well-developed, well nourished patient in no acute distress.  They are alert and oriented and cooperative to examination.        Examination of the left shoulder, skin is dry and intact, no erythema or ecchymosis, no signs symptoms of infection.  No evidence of wound failure dehiscence.  Range of motion, he can forward flex to approximately 130°.  He can abduct similarly.  He can externally rotate to 50°.  He can internally rotate to  "the posterior left hip.    Pertinent New Results:    XRAY Report / Interpretation:   AP lateral views of the left shoulder taken today in the office demonstrate a left total shoulder arthroplasty to be in appropriate position without evidence of hardware failure or loosening.  No fractures or dislocations.  Visualized soft tissues are normal.    Assessment/Plan:      Stable status post left total shoulder arthroplasty 6 weeks ago.  He is doing well in terms of pain control and range motion.  He continues with physical therapy 3 times a week.  We will continue physical therapy will check him back 1 more time in about 2 months.    Wound Care review: on approximately day 3 after surgery, or 72 hours after your initial surgery date, you may begin cleaning your wounds (AS LONG AS THERE IS NO DRAINAGE PRESENT).     Gentle cleansing with a mild soap and water is recommended. Pat the area dry, and then cover the wound with a clean, sterile dressing.  Do this at least once daily.  Do not apply any ointments creams or lotions to the wounds until told to do so.  Avoid submerging or immersing the wounds in water such as a sink, tub, or pool for at least 1 month after surgery.     Jonny Pérez, Physician Assistant, served in the capacity as a "scribe" for this patient encounter.  A "face-to-face" encounter occurred with Dr. Germán Carty on this date.  The treatment plan and medical decision-making is outlined above. Patient was seen and examined with a chaperone.       This note was created using Dragon voice recognition software that occasionally misinterpreted phrases or words.          "

## 2024-07-16 ENCOUNTER — OFFICE VISIT (OUTPATIENT)
Dept: ORTHOPEDICS | Facility: CLINIC | Age: 74
End: 2024-07-16
Payer: MEDICARE

## 2024-07-16 ENCOUNTER — PATIENT MESSAGE (OUTPATIENT)
Dept: FAMILY MEDICINE | Facility: CLINIC | Age: 74
End: 2024-07-16
Payer: MEDICARE

## 2024-07-16 ENCOUNTER — HOSPITAL ENCOUNTER (OUTPATIENT)
Dept: RADIOLOGY | Facility: HOSPITAL | Age: 74
Discharge: HOME | End: 2024-07-16
Attending: ORTHOPAEDIC SURGERY
Payer: MEDICARE

## 2024-07-16 VITALS
DIASTOLIC BLOOD PRESSURE: 76 MMHG | SYSTOLIC BLOOD PRESSURE: 122 MMHG | HEIGHT: 66 IN | BODY MASS INDEX: 26.52 KG/M2 | WEIGHT: 165 LBS

## 2024-07-16 DIAGNOSIS — Z96.612 STATUS POST SHOULDER REPLACEMENT, LEFT: Primary | ICD-10-CM

## 2024-07-16 PROCEDURE — 99213 OFFICE O/P EST LOW 20 MIN: CPT | Mod: PBBFAC,25,PN | Performed by: ORTHOPAEDIC SURGERY

## 2024-07-16 PROCEDURE — 99999 PR PBB SHADOW E&M-EST. PATIENT-LVL III: CPT | Mod: PBBFAC,,, | Performed by: ORTHOPAEDIC SURGERY

## 2024-07-16 PROCEDURE — 73030 X-RAY EXAM OF SHOULDER: CPT | Mod: 26,LT,, | Performed by: RADIOLOGY

## 2024-07-16 PROCEDURE — 99213 OFFICE O/P EST LOW 20 MIN: CPT | Mod: S$PBB,,, | Performed by: ORTHOPAEDIC SURGERY

## 2024-07-16 PROCEDURE — 73030 X-RAY EXAM OF SHOULDER: CPT | Mod: TC,PN,LT

## 2024-07-16 NOTE — PROGRESS NOTES
[facility]    Subjective:     Chief Complaint:   Chief Complaint   Patient presents with    Left Shoulder - Pain     Patient is here for a f/u on left TSA 4.3.24, states pain has stayed about the same since last visit with slight improvement. Painful and limited ROM        Past Medical History:   Diagnosis Date    Allergic rhinitis, seasonal     Asthma     Back pain     DDD (degenerative disc disease), cervical     Digestive disorder     Gout attack     Hyperlipidemia     PONV (postoperative nausea and vomiting)     Prostate cancer     Prostate cancer 08/01/2019       Past Surgical History:   Procedure Laterality Date    ARTHROPLASTY OF SHOULDER Left 4/3/2024    Procedure: ARTHROPLASTY, SHOULDER;  Surgeon: Germán Carty MD;  Location: [facility] OR;  Service: Orthopedics;  Laterality: Left;  Sagar Arriaza- CATHERINEO    BACK SURGERY  01/29/2016    CYSTOSCOPY N/A 12/1/2021    Procedure: CYSTOSCOPY;  Surgeon: Marek Guidry Jr., MD;  Location: [facility];  Service: Urology;  Laterality: N/A;    INGUINAL HERNIA REPAIR         Current Outpatient Medications   Medication Sig    allopurinoL (ZYLOPRIM) 100 MG tablet TAKE 2 TABLETS BY MOUTH EVERY DAY    azelastine (ASTELIN) 137 mcg (0.1 %) nasal spray 1 spray (137 mcg total) by Nasal route 2 (two) times daily.    colchicine (COLCRYS) 0.6 mg tablet TAKE 1 TABLET BY MOUTH EVERY DAY    ezetimibe (ZETIA) 10 mg tablet TAKE 1 TABLET BY MOUTH EVERY DAY    omega-3 acid ethyl esters (LOVAZA) 1 gram capsule TAKE 2 CAPSULES BY MOUTH TWICE A DAY    tadalafiL (CIALIS) 5 MG tablet Take 5 mg by mouth.    tamsulosin (FLOMAX) 0.4 mg Cap Take 1 capsule (0.4 mg total) by mouth once daily.    VYZULTA 0.024 % Drop Place 1 drop into both eyes once daily.     No current facility-administered medications for this visit.       Review of patient's allergies indicates:   Allergen Reactions    Grass pollen-june grass standard Other (See Comments)     Triggers his sinuses    Permethrin      Pt is not sure  of this allergy      Statins-hmg-coa reductase inhibitors Other (See Comments)     Myopathy       Family History   Problem Relation Name Age of Onset    Hypertension Mother      Diabetes Mother      Hyperlipidemia Mother      Heart disease Mother      Glaucoma Mother      Glaucoma Father      Hyperlipidemia Father      Diabetes Father      Heart disease Sister      Diabetes Sister      Hypertension Sister      Cirrhosis Sister      No Known Problems Daughter      No Known Problems Daughter      No Known Problems Daughter      No Known Problems Daughter      No Known Problems Son      No Known Problems Son      Heart disease Maternal Aunt      Heart disease Maternal Uncle         Social History     Socioeconomic History    Marital status:    Occupational History     Employer: [employer]   Tobacco Use    Smoking status: Former     Current packs/day: 0.00     Types: Cigarettes     Quit date: 2010     Years since quittin.5    Smokeless tobacco: Never   Substance and Sexual Activity    Alcohol use: Yes     Comment: soc    Drug use: Never    Sexual activity: Yes     Partners: Female     Social Determinants of Health     Financial Resource Strain: Low Risk  (2024)    Overall Financial Resource Strain (CARDIA)     Difficulty of Paying Living Expenses: Not hard at all   Food Insecurity: No Food Insecurity (2024)    Hunger Vital Sign     Worried About Running Out of Food in the Last Year: Never true     Ran Out of Food in the Last Year: Never true   Transportation Needs: No Transportation Needs (2024)    PRAPARE - Transportation     Lack of Transportation (Medical): No     Lack of Transportation (Non-Medical): No   Physical Activity: Sufficiently Active (2024)    Exercise Vital Sign     Days of Exercise per Week: 5 days     Minutes of Exercise per Session: 60 min   Stress: No Stress Concern Present (2024)    [employer] [employer] - Occupational Stress Questionnaire      Feeling of Stress : Only a little   Housing Stability: Low Risk  (8/2/2022)    Housing Stability Vital Sign     Unable to Pay for Housing in the Last Year: No     Number of Places Lived in the Last Year: 1     Unstable Housing in the Last Year: No       History of present illness: 74-year-old male returns to clinic today status post left total shoulder arthroplasty, traditional done April 3rd. He is doing well in terms of pain control and range of motion. He continues with physical therapy.       Review of Systems:    Constitution: Negative for chills, fever, and sweats.  Negative for unexplained weight loss.    HENT:  Negative for headaches and blurry vision.    Cardiovascular:Negative for chest pain or irregular heart beat. Negative for hypertension.    Respiratory:  Negative for cough and shortness of breath.    Gastrointestinal: Negative for abdominal pain, heartburn, melena, nausea, and vomitting.    Genitourinary:  Negative bladder incontinence and dysuria.    Musculoskeletal:  See HPI for details.     Neurological: Negative for numbness.    Psychiatric/Behavioral: Negative for depression.  The patient is not nervous/anxious.      Endocrine: Negative for polyuria    Hematologic/Lymphatic: Negative for bleeding problem.  Does not bruise/bleed easily.    Skin: Negative for poor would healing and rash    Objective:      Physical Examination:    Vital Signs:    Vitals:    07/16/24 0857   BP: 122/76       Body mass index is 26.63 kg/m².    This a well-developed, well nourished patient in no acute distress.  They are alert and oriented and cooperative to examination.        Examination of the left shoulder, skin is dry and intact, no erythema or ecchymosis, no signs symptoms of infection.  Range of motion, he can forward flex to 160°, he can externally rotate to 60°, he can internally rotate to the posterior left  Hip.    Pertinent New Results:    XRAY Report / Interpretation:   AP and lateral views of the left  "shoulder taken today in the office demonstrate a traditional shoulder arthroplasty to be in appropriate position without evidence of hardware failure or loosening.    Assessment/Plan:      Stable status post left total shoulder arthroplasty 3 months ago.  He is doing well.  He is fishing, he has resumed all his regular activities, he swimming without any significant it limitations or pain.  At This point he is released to follow up with us on an as-needed basis.    Jonny Pérez, Physician Assistant, served in the capacity as a "scribe" for this patient encounter.  A "face-to-face" encounter occurred with Dr. Germán Carty on this date.  The treatment plan and medical decision-making is outlined above. Patient was seen and examined with a chaperone.       This note was created using Dragon voice recognition software that occasionally misinterpreted phrases or words.          "

## 2024-07-17 DIAGNOSIS — R39.9 UNSPECIFIED SYMPTOMS AND SIGNS INVOLVING THE GENITOURINARY SYSTEM: ICD-10-CM

## 2024-07-17 DIAGNOSIS — N52.9 MALE ERECTILE DYSFUNCTION, UNSPECIFIED: ICD-10-CM

## 2024-07-17 RX ORDER — TADALAFIL 5 MG/1
5 TABLET ORAL
Qty: 30 TABLET | Refills: 11 | Status: SHIPPED | OUTPATIENT
Start: 2024-07-17

## 2024-10-15 DIAGNOSIS — E78.5 HYPERLIPIDEMIA: ICD-10-CM

## 2024-10-15 RX ORDER — EZETIMIBE 10 MG/1
TABLET ORAL
Qty: 90 TABLET | Refills: 3 | Status: SHIPPED | OUTPATIENT
Start: 2024-10-15

## 2024-10-15 NOTE — TELEPHONE ENCOUNTER
Refill Routing Note   Medication(s) are not appropriate for processing by [facility] for the following reason(s):        Required labs outdated    [facility] action(s):  Defer   Requires labs : Yes  - CMP, Uric acid            Appointments  past 12m or future 3m with PCP    Date Provider   Last Visit   3/14/2024 Brendan Frank Jr., MD   Next Visit   Visit date not found Brendan Frank Jr., MD   ED visits in past 90 days: 0        Note composed:3:59 PM 10/15/2024

## 2024-10-15 NOTE — TELEPHONE ENCOUNTER
Care Due:                  Date            Visit Type   Department     Provider  --------------------------------------------------------------------------------                                EP -                              PRIMARY      [facility]  Last Visit: 03-      CARE ([facility])   PRACTICE       Brendan Frank  Next Visit: None Scheduled  None         None Found                                                            Last  Test          Frequency    Reason                     Performed    Due Date  --------------------------------------------------------------------------------    CMP.........  12 months..  allopurinoL, ezetimibe,    11- 11-                             omega-3..................    Uric Acid...  12 months..  allopurinoL, colchicine..  11- 11-    [facility] Embedded Care Due Messages. Reference number: [id number].   10/15/2024 11:52:14 AM CDT

## 2024-11-04 ENCOUNTER — OFFICE VISIT (OUTPATIENT)
Dept: FAMILY MEDICINE | Facility: CLINIC | Age: 74
End: 2024-11-04
Payer: MEDICARE

## 2024-11-04 ENCOUNTER — LAB VISIT (OUTPATIENT)
Dept: LAB | Facility: HOSPITAL | Age: 74
End: 2024-11-04
Payer: MEDICARE

## 2024-11-04 VITALS
SYSTOLIC BLOOD PRESSURE: 114 MMHG | DIASTOLIC BLOOD PRESSURE: 70 MMHG | OXYGEN SATURATION: 97 % | BODY MASS INDEX: 26.58 KG/M2 | TEMPERATURE: 98 F | WEIGHT: 165.38 LBS | RESPIRATION RATE: 16 BRPM | HEIGHT: 66 IN | HEART RATE: 53 BPM

## 2024-11-04 DIAGNOSIS — H40.9 GLAUCOMA, UNSPECIFIED GLAUCOMA TYPE, UNSPECIFIED LATERALITY: ICD-10-CM

## 2024-11-04 DIAGNOSIS — R00.1 BRADYCARDIA: ICD-10-CM

## 2024-11-04 DIAGNOSIS — R42 DIZZINESS: ICD-10-CM

## 2024-11-04 DIAGNOSIS — R42 DIZZINESS: Primary | ICD-10-CM

## 2024-11-04 LAB
ALBUMIN SERPL BCP-MCNC: 4.1 G/DL (ref 3.5–5.2)
ALP SERPL-CCNC: 68 U/L (ref 40–150)
ALT SERPL W/O P-5'-P-CCNC: 30 U/L (ref 10–44)
ANION GAP SERPL CALC-SCNC: 10 MMOL/L (ref 8–16)
AST SERPL-CCNC: 31 U/L (ref 10–40)
BASOPHILS # BLD AUTO: 0.05 K/UL (ref 0–0.2)
BASOPHILS NFR BLD: 0.9 % (ref 0–1.9)
BILIRUB SERPL-MCNC: 0.3 MG/DL (ref 0.1–1)
BUN SERPL-MCNC: 10 MG/DL (ref 8–23)
CALCIUM SERPL-MCNC: 9.8 MG/DL (ref 8.7–10.5)
CHLORIDE SERPL-SCNC: 108 MMOL/L (ref 95–110)
CO2 SERPL-SCNC: 24 MMOL/L (ref 23–29)
CREAT SERPL-MCNC: 1.1 MG/DL (ref 0.5–1.4)
DIFFERENTIAL METHOD BLD: NORMAL
EOSINOPHIL # BLD AUTO: 0.3 K/UL (ref 0–0.5)
EOSINOPHIL NFR BLD: 4.7 % (ref 0–8)
ERYTHROCYTE [DISTWIDTH] IN BLOOD BY AUTOMATED COUNT: 13.5 % (ref 11.5–14.5)
EST. GFR  (NO RACE VARIABLE): >60 ML/MIN/1.73 M^2
GLUCOSE SERPL-MCNC: 103 MG/DL (ref 70–110)
HCT VFR BLD AUTO: 48.6 % (ref 40–54)
HGB BLD-MCNC: 15.7 G/DL (ref 14–18)
IMM GRANULOCYTES # BLD AUTO: 0.02 K/UL (ref 0–0.04)
IMM GRANULOCYTES NFR BLD AUTO: 0.3 % (ref 0–0.5)
LYMPHOCYTES # BLD AUTO: 1.7 K/UL (ref 1–4.8)
LYMPHOCYTES NFR BLD: 28.4 % (ref 18–48)
MCH RBC QN AUTO: 29.4 PG (ref 27–31)
MCHC RBC AUTO-ENTMCNC: 32.3 G/DL (ref 32–36)
MCV RBC AUTO: 91 FL (ref 82–98)
MONOCYTES # BLD AUTO: 0.5 K/UL (ref 0.3–1)
MONOCYTES NFR BLD: 7.8 % (ref 4–15)
NEUTROPHILS # BLD AUTO: 3.4 K/UL (ref 1.8–7.7)
NEUTROPHILS NFR BLD: 57.9 % (ref 38–73)
NRBC BLD-RTO: 0 /100 WBC
PLATELET # BLD AUTO: 206 K/UL (ref 150–450)
PMV BLD AUTO: 10.1 FL (ref 9.2–12.9)
POTASSIUM SERPL-SCNC: 3.9 MMOL/L (ref 3.5–5.1)
PROT SERPL-MCNC: 7 G/DL (ref 6–8.4)
RBC # BLD AUTO: 5.34 M/UL (ref 4.6–6.2)
SODIUM SERPL-SCNC: 142 MMOL/L (ref 136–145)
WBC # BLD AUTO: 5.8 K/UL (ref 3.9–12.7)

## 2024-11-04 PROCEDURE — 99214 OFFICE O/P EST MOD 30 MIN: CPT | Mod: PBBFAC,PO

## 2024-11-04 PROCEDURE — 80053 COMPREHEN METABOLIC PANEL: CPT

## 2024-11-04 PROCEDURE — 99999 PR PBB SHADOW E&M-EST. PATIENT-LVL IV: CPT | Mod: PBBFAC,,,

## 2024-11-04 PROCEDURE — 85025 COMPLETE CBC W/AUTO DIFF WBC: CPT

## 2024-11-04 PROCEDURE — 36415 COLL VENOUS BLD VENIPUNCTURE: CPT | Mod: PO

## 2024-11-04 PROCEDURE — 99214 OFFICE O/P EST MOD 30 MIN: CPT | Mod: S$PBB,,,

## 2024-11-04 RX ORDER — MECLIZINE HCL 12.5 MG 12.5 MG/1
12.5 TABLET ORAL 3 TIMES DAILY PRN
Qty: 90 TABLET | Refills: 0 | Status: SHIPPED | OUTPATIENT
Start: 2024-11-04 | End: 2025-02-02

## 2024-11-04 RX ORDER — FLUTICASONE PROPIONATE 50 MCG
1 SPRAY, SUSPENSION (ML) NASAL DAILY
Qty: 11.1 ML | Refills: 0 | Status: SHIPPED | OUTPATIENT
Start: 2024-11-04 | End: 2025-02-02

## 2024-11-04 NOTE — PROGRESS NOTES
Subjective:       Patient ID:  [id number]     Chief Complaint: Dizziness      History of Present Illness        Mr. Heredia is a 74-year-old male who presents to the clinic for dizziness.      Patient reports that he has been experiencing daily dizziness for the last 3-4 weeks.  Patient reports that symptoms can occur at random noting sitting down at [employer] and feeling lightheaded as well as lying down under his boat and feeling lightheaded.  He denies associated syncope or presyncopal episodes.  He denies associated shortness of breath chest pain.    Patient reports that he was only drinking approximately 12 oz of water a day.  Patient does have a history of glaucoma is currently seeing ophthalmology for this.  He reports that since starting a new eye drop several months ago  he has noticed headaches associated with reading.  He reports that he has an appointment with his eye doctor the end of the month.         Past Medical History:   Diagnosis Date    Allergic rhinitis, seasonal     Asthma     Back pain     DDD (degenerative disc disease), cervical     Digestive disorder     Gout attack     Hyperlipidemia     PONV (postoperative nausea and vomiting)     Prostate cancer     Prostate cancer 08/01/2019      Active Problem List with Overview Notes    Diagnosis Date Noted    Myopathy, unspecified 03/15/2024    Abdominal aortic atherosclerosis 08/02/2022    Statin intolerance 08/02/2022    Personal history of prostate cancer 09/09/2021    Prostate cancer 08/01/2019    Hyperuricemia 07/28/2017    Chronic pansinusitis 10/13/2016    Migraine aura without headache 10/08/2013    Gout     Allergic rhinitis, seasonal     Hyperlipidemia 01/17/2013      Review of patient's allergies indicates:   Allergen Reactions    Grass pollen-june grass standard Other (See Comments)     Triggers his sinuses    Permethrin      Pt is not sure of this allergy      Statins-hmg-coa reductase inhibitors Other (See Comments)     Myopathy          Current Outpatient Medications:     allopurinoL (ZYLOPRIM) 100 MG tablet, TAKE 2 TABLETS BY MOUTH EVERY DAY, Disp: 180 tablet, Rfl: 3    azelastine (ASTELIN) 137 mcg (0.1 %) nasal spray, 1 spray (137 mcg total) by Nasal route 2 (two) times daily., Disp: 30 mL, Rfl: 0    colchicine (COLCRYS) 0.6 mg tablet, TAKE 1 TABLET BY MOUTH EVERY DAY, Disp: 90 tablet, Rfl: 3    ezetimibe (ZETIA) 10 mg tablet, TAKE 1 TABLET BY MOUTH EVERY DAY, Disp: 90 tablet, Rfl: 3    omega-3 acid ethyl esters (LOVAZA) 1 gram capsule, TAKE 2 CAPSULES BY MOUTH TWICE A DAY, Disp: 360 capsule, Rfl: 3    tamsulosin (FLOMAX) 0.4 mg Cap, Take 1 capsule (0.4 mg total) by mouth once daily., Disp: 90 capsule, Rfl: 3    VYZULTA 0.024 % Drop, Place 1 drop into both eyes once daily., Disp: , Rfl:     fluticasone propionate (FLONASE) 50 mcg/actuation nasal spray, 1 spray (50 mcg total) by Each Nostril route once daily., Disp: 11.1 mL, Rfl: 0    meclizine (ANTIVERT) 12.5 mg tablet, Take 1 tablet (12.5 mg total) by mouth 3 (three) times daily as needed for Dizziness., Disp: 90 tablet, Rfl: 0    tadalafiL (CIALIS) 5 MG tablet, Take 1 tablet (5 mg total) by mouth once daily. (Patient not taking: Reported on 11/4/2024), Disp: 30 tablet, Rfl: 11    Lab Results   Component Value Date    WBC 5.95 03/27/2024    HGB 16.4 03/27/2024    HCT 50.1 03/27/2024     03/27/2024    CHOL 218 (H) 03/27/2024    TRIG 187 (H) 03/27/2024    HDL 41 03/27/2024    ALT 40 11/09/2022    AST 37 11/09/2022     03/27/2024    K 5.0 03/27/2024     03/27/2024    CREATININE 1.1 03/27/2024    BUN 13 03/27/2024    CO2 29 03/27/2024    TSH 1.334 11/09/2022    PSA 0.25 09/10/2021    HGBA1C 5.6 11/09/2022       Review of Systems   Constitutional:  Negative for fatigue and fever.   HENT: Negative.  Negative for congestion, sneezing and sore throat.    Eyes: Negative.    Respiratory:  Negative for cough, shortness of breath and wheezing.    Cardiovascular:  Negative for  chest pain, palpitations and leg swelling.   Gastrointestinal:  Negative for abdominal pain, nausea and vomiting.   Genitourinary: Negative.    Musculoskeletal: Negative.  Negative for arthralgias.   Skin: Negative.  Negative for rash.   Neurological:  Positive for dizziness. Negative for weakness, light-headedness, numbness and headaches.   Hematological: Negative.    Psychiatric/Behavioral: Negative.     All other systems reviewed and are negative.      Objective:      Physical Exam  Constitutional:       Appearance: Normal appearance.   HENT:      Head: Normocephalic and atraumatic.      Left Ear: A middle ear effusion is present.      Nose: Nose normal.   Eyes:      Extraocular Movements: Extraocular movements intact.   Cardiovascular:      Rate and Rhythm: Normal rate and regular rhythm.   Pulmonary:      Effort: Pulmonary effort is normal.      Breath sounds: Normal breath sounds.   Musculoskeletal:         General: Normal range of motion.      Cervical back: Normal range of motion.   Skin:     General: Skin is warm and dry.   Neurological:      General: No focal deficit present.      Mental Status: He is alert and oriented to person, place, and time.   Psychiatric:         Mood and Affect: Mood normal.         Assessment:       1. Dizziness    2. Glaucoma, unspecified glaucoma type, unspecified laterality    3. Bradycardia        Plan:       Shree was seen today for dizziness.    Diagnoses and all orders for this visit:    Dizziness  Orthostatic vitals with small dip.  Patient's EKG shows sinus bradycardia, chart review has a history of bradycardic episodes.  Exam significant for left ear effusion.  Unclear if heart rate is contributing to patient's symptoms.  Recommend that patient increase fluids and try Flonase and meclizine.  Recommend follow up if symptoms do not improve with above recommendations.  -     IN OFFICE EKG 12-LEAD (to Muse)  -     fluticasone propionate (FLONASE) 50 mcg/actuation nasal spray;  1 spray (50 mcg total) by Each Nostril route once daily.  -     meclizine (ANTIVERT) 12.5 mg tablet; Take 1 tablet (12.5 mg total) by mouth 3 (three) times daily as needed for Dizziness.  -     COMPREHENSIVE METABOLIC PANEL; Future  -     CBC W/ AUTO DIFFERENTIAL; Future  -     Urinalysis; Future    Glaucoma, unspecified glaucoma type, unspecified laterality  Advised follow up with Ophthalmology.            Future Appointments       Date Specialty Appt Notes    11/4/2024 Lab .    11/4/2024 Lab .             I spent a total of 31 minutes on the day of the visit.This includes face to face time and non-face to face time preparing to see the patient (eg, review of tests), obtaining and/or reviewing separately obtained history, documenting clinical information in the electronic or other health record, independently interpreting results and communicating results to the patient/family/caregiver, or care coordinator.     Portions of this note were dictated using voice recognition software and may contain dictation related errors in spelling / grammar / syntax not discovered on text review.     Vero Beckham PA-C

## 2024-11-09 ENCOUNTER — OFFICE VISIT (OUTPATIENT)
Dept: URGENT CARE | Facility: CLINIC | Age: 74
End: 2024-11-09
Payer: MEDICARE

## 2024-11-09 VITALS
DIASTOLIC BLOOD PRESSURE: 68 MMHG | BODY MASS INDEX: 26.52 KG/M2 | WEIGHT: 165 LBS | SYSTOLIC BLOOD PRESSURE: 106 MMHG | RESPIRATION RATE: 18 BRPM | HEIGHT: 66 IN | OXYGEN SATURATION: 97 % | HEART RATE: 60 BPM | TEMPERATURE: 97 F

## 2024-11-09 DIAGNOSIS — J01.00 ACUTE NON-RECURRENT MAXILLARY SINUSITIS: Primary | ICD-10-CM

## 2024-11-09 PROCEDURE — 99214 OFFICE O/P EST MOD 30 MIN: CPT | Mod: S$GLB,,, | Performed by: EMERGENCY MEDICINE

## 2024-11-09 RX ORDER — AMOXICILLIN 875 MG/1
875 TABLET, FILM COATED ORAL 2 TIMES DAILY
Qty: 20 TABLET | Refills: 0 | Status: SHIPPED | OUTPATIENT
Start: 2024-11-09 | End: 2024-11-19

## 2024-11-09 RX ORDER — DEXAMETHASONE SODIUM PHOSPHATE 4 MG/ML
8 INJECTION, SOLUTION INTRA-ARTICULAR; INTRALESIONAL; INTRAMUSCULAR; INTRAVENOUS; SOFT TISSUE
Status: SHIPPED | OUTPATIENT
Start: 2024-11-09

## 2024-11-09 NOTE — PROGRESS NOTES
"Subjective:      Patient ID: Shree Heredia is a 74 y.o. male.    Vitals:  height is 5' 6" (1.676 m) and weight is 74.8 kg (165 lb). His oral temperature is 96.9 °F (36.1 °C). His blood pressure is 106/68 and his pulse is 60. His respiration is 18 and oxygen saturation is 97%.     Chief Complaint: Sinus Problem    74-year-old male with sinus congestion and symptoms of upper respiratory infection and sinusitis with sore throat and feeling drained for the past 4 days.    Sinus Problem  This is a new problem. The current episode started in the past 7 days. The problem has been gradually worsening since onset. There has been no fever. His pain is at a severity of 0/10. He is experiencing no pain. Associated symptoms include congestion, coughing, headaches and sinus pressure. Pertinent negatives include no chills, ear pain, hoarse voice, sore throat or swollen glands. Past treatments include nasal decongestants. The treatment provided no relief.       Constitution: Negative. Negative for chills.   HENT:  Positive for congestion and sinus pressure. Negative for ear pain and sore throat.    Neck: neck negative.   Cardiovascular: Negative.    Eyes: Negative.    Respiratory:  Positive for cough.    Gastrointestinal: Negative.    Endocrine: negative.   Genitourinary: Negative.    Musculoskeletal: Negative.    Skin: Negative.    Allergic/Immunologic: Negative.    Neurological:  Positive for headaches.   Hematologic/Lymphatic: Negative.       Objective:     Physical Exam   Constitutional: He is oriented to person, place, and time. He appears well-developed. He is cooperative.  Non-toxic appearance. He does not appear ill. No distress.   HENT:   Head: Normocephalic and atraumatic.   Ears:   Right Ear: Hearing, tympanic membrane, external ear and ear canal normal.   Left Ear: Hearing, tympanic membrane, external ear and ear canal normal.      Comments: Bilateral maxillary tenderness  Nose: Rhinorrhea present. No mucosal " edema or nasal deformity. No epistaxis. Right sinus exhibits no maxillary sinus tenderness and no frontal sinus tenderness. Left sinus exhibits no maxillary sinus tenderness and no frontal sinus tenderness.   Mouth/Throat: Uvula is midline, oropharynx is clear and moist and mucous membranes are normal. No trismus in the jaw. Normal dentition. No uvula swelling. No oropharyngeal exudate, posterior oropharyngeal edema or posterior oropharyngeal erythema.   Eyes: Conjunctivae and lids are normal. No scleral icterus.   Neck: Trachea normal and phonation normal. Neck supple. No edema present. No erythema present. No neck rigidity present.   Cardiovascular: Normal rate, regular rhythm, normal heart sounds and normal pulses.   Pulmonary/Chest: Effort normal and breath sounds normal. No respiratory distress. He has no decreased breath sounds. He has no rhonchi.   Abdominal: Normal appearance.   Musculoskeletal: Normal range of motion.         General: No deformity. Normal range of motion.   Neurological: He is alert and oriented to person, place, and time. He exhibits normal muscle tone. Coordination normal.   Skin: Skin is warm, dry, intact, not diaphoretic and not pale.   Psychiatric: His speech is normal and behavior is normal. Judgment and thought content normal.   Nursing note and vitals reviewed.    Assessment:     1. Acute non-recurrent maxillary sinusitis        Plan:       Acute non-recurrent maxillary sinusitis    Other orders  -     dexAMETHasone injection 8 mg  -     amoxicillin (AMOXIL) 875 MG tablet; Take 1 tablet (875 mg total) by mouth 2 (two) times daily. for 10 days  Dispense: 20 tablet; Refill: 0          Medical Decision Making:   Initial Assessment:   74-year-old male with symptoms consistent with upper respiratory infection and sinusitis.  Steroid given and started on antibiotics given patient's presentation and previous episodes of sinusitis which response to similar treatment.  Screening strep flu  and COVID tests are negative.  Plan is to discharged with return precautions and instructions and follow-up  Clinical Tests:   Lab Tests: Reviewed and Ordered

## 2024-11-13 DIAGNOSIS — E78.5 HYPERLIPIDEMIA: ICD-10-CM

## 2024-11-13 RX ORDER — OMEGA-3-ACID ETHYL ESTERS 1 G/1
CAPSULE, LIQUID FILLED ORAL
Qty: 360 CAPSULE | Refills: 1 | Status: SHIPPED | OUTPATIENT
Start: 2024-11-13

## 2024-11-13 NOTE — TELEPHONE ENCOUNTER
Care Due:                  Date            Visit Type   Department     Provider  --------------------------------------------------------------------------------                                EP -                              PRIMARY      [facility]  Last Visit: 03-      CARE ([facility])   PRACTICE       Brendan Frank  Next Visit: None Scheduled  None         None Found                                                            Last  Test          Frequency    Reason                     Performed    Due Date  --------------------------------------------------------------------------------    Uric Acid...  12 months..  allopurinoL, colchicine..  Not Found    Overdue    [employer] Embedded Care Due Messages. Reference number: [id number].   11/13/2024 12:24:08 AM CST

## 2024-11-13 NOTE — TELEPHONE ENCOUNTER
Provider Staff:  Action required for this patient    Requires labs      Please see care gap opportunities below in Care Due Message.    Thanks!  [facility]     Appointments      Date Provider   Last Visit   3/14/2024 Brendan Frank Jr., MD   Next Visit   Visit date not found Brendan Frank Jr., MD     Refill Decision Note   Shree Heredia  is requesting a refill authorization.  Brief Assessment and Rationale for Refill:  Approve     Medication Therapy Plan:         Comments:     Note composed:10:01 AM 11/13/2024

## 2024-11-14 DIAGNOSIS — M10.00 ACUTE IDIOPATHIC GOUT, UNSPECIFIED SITE: ICD-10-CM

## 2024-11-14 RX ORDER — COLCHICINE 0.6 MG/1
TABLET ORAL
Qty: 90 TABLET | Refills: 3 | Status: SHIPPED | OUTPATIENT
Start: 2024-11-14

## 2024-11-14 NOTE — TELEPHONE ENCOUNTER
Refill Routing Note   Medication(s) are not appropriate for processing by [facility] for the following reason(s):        Required labs outdated    [facility] action(s):  Defer             Appointments  past 12m or future 3m with PCP    Date Provider   Last Visit   3/14/2024 Brendan Frank Jr., MD   Next Visit   Visit date not found Brendan Frank Jr., MD   ED visits in past 90 days: 0        Note composed:6:14 AM 11/14/2024

## 2024-11-14 NOTE — TELEPHONE ENCOUNTER
No care due was identified.  [facility] Embedded Care Due Messages. Reference number: [id number].   11/14/2024 12:29:34 AM CST

## 2024-11-21 ENCOUNTER — LAB VISIT (OUTPATIENT)
Dept: LAB | Facility: HOSPITAL | Age: 74
End: 2024-11-21
Attending: UROLOGY
Payer: MEDICARE

## 2024-11-21 ENCOUNTER — OFFICE VISIT (OUTPATIENT)
Dept: UROLOGY | Facility: CLINIC | Age: 74
End: 2024-11-21
Payer: MEDICARE

## 2024-11-21 VITALS — HEIGHT: 66 IN | BODY MASS INDEX: 26.22 KG/M2 | WEIGHT: 163.13 LBS

## 2024-11-21 DIAGNOSIS — R39.9 UNSPECIFIED SYMPTOMS AND SIGNS INVOLVING THE GENITOURINARY SYSTEM: ICD-10-CM

## 2024-11-21 DIAGNOSIS — C61 PROSTATE CANCER: Primary | ICD-10-CM

## 2024-11-21 DIAGNOSIS — N52.8 OTHER MALE ERECTILE DYSFUNCTION: ICD-10-CM

## 2024-11-21 DIAGNOSIS — C61 PROSTATE CANCER: ICD-10-CM

## 2024-11-21 DIAGNOSIS — R39.9 LOWER URINARY TRACT SYMPTOMS (LUTS): ICD-10-CM

## 2024-11-21 LAB — COMPLEXED PSA SERPL-MCNC: 0.07 NG/ML (ref 0–4)

## 2024-11-21 PROCEDURE — 84153 ASSAY OF PSA TOTAL: CPT | Performed by: UROLOGY

## 2024-11-21 PROCEDURE — 99999 PR PBB SHADOW E&M-EST. PATIENT-LVL III: CPT | Mod: PBBFAC,,, | Performed by: UROLOGY

## 2024-11-21 PROCEDURE — 99213 OFFICE O/P EST LOW 20 MIN: CPT | Mod: PBBFAC,PO | Performed by: UROLOGY

## 2024-11-21 PROCEDURE — G2211 COMPLEX E/M VISIT ADD ON: HCPCS | Mod: S$PBB,,, | Performed by: UROLOGY

## 2024-11-21 PROCEDURE — 99214 OFFICE O/P EST MOD 30 MIN: CPT | Mod: S$PBB,,, | Performed by: UROLOGY

## 2024-11-21 PROCEDURE — 36415 COLL VENOUS BLD VENIPUNCTURE: CPT | Performed by: UROLOGY

## 2024-11-21 RX ORDER — TADALAFIL 5 MG/1
5 TABLET ORAL DAILY
Qty: 30 TABLET | Refills: 11 | Status: SHIPPED | OUTPATIENT
Start: 2024-11-21 | End: 2025-11-21

## 2024-11-21 RX ORDER — TAMSULOSIN HYDROCHLORIDE 0.4 MG/1
1 CAPSULE ORAL DAILY
Qty: 90 CAPSULE | Refills: 3 | Status: SHIPPED | OUTPATIENT
Start: 2024-11-21

## 2024-11-21 NOTE — PROGRESS NOTES
[facility] Established Patient/H&P:    Shree Heredia is a 74 y.o. male who presents for ollow up for prostate cancer and gross hematuria.     Patient with a history of [place] 3+3=6 prostate cancer diagnosed in 5/2018 with Dr. Hicks. He is now s/p brachytherapy in 9/2018 with Dr. Dobbs in [place].      He underwent initial bone scan which was negative. CT abdomen pelvis with a 2 mm right lung nodule. Stable on follow up CT chest.      He reports moderate lower urinary tract symptoms and erectile dysfunction after brachytherapy. He is managed with Flomax 0.4 mg and Cialis 5 mg for his symptoms.      On review of records, he last saw Dr. Hicks in 5/2020 for gross hematuria. CT revealed a left kidney stone. Urine cytology with atypical cells. Cystoscopy never performed. Hematuria has resolved.      Retired [employer]        Interval History     8/29/22: He is now s/p cystoscopy on 12/1/21 which revealed a friable, hypervascular, mildly obstructing prostate. Repeat urine cytology negative.      He remains on Flomax 0.4 mg  and Cialis 5 mg PO daily. He only reports mild incomplete emptying and nocturia x 2. Urine dipstick negative today. No recurrent gross hematuria.     4/3/23: Here today for follow up. No recent episodes of gross hematuria. He remains on Flomax 0.4 mg and Cialis 5 mg PO daily for LUTS and ED.      States that he has had some difficulty with maintaining an erection that has been worsening. Interested in Cialis as needed also. Of note, he reports bilateral scrotal cysts. States it was evaluated several years ago and he was told it was benign. Reports that he has noticed some growth of the cysts in his scrotum. Also has had moderate pain after intercourse.      12/29/23: US scrotum on 4/6/23 with bilateral atrophied testicles, 3.6 cm left epididymal cyst and a small left hydrocele. Here today for follow up. Remains on Cialis 5 mg PO daily for his LUTS and ED. Using Cialis  "20 mg as needed.     11/21/24: Here today for follow up. Last PSA low at 0.10. Remains on Cialis 5 mg PO daily for his LUTS and ED. Using Cialis 20 mg as needed. Also on Flomax 0.4 mg PO daily. Unsure if he needs to continue Flomax.      Denies any fever, chills, flank pain, bone pain, unintentional weight loss or  trauma.         PSA  0.10  12/29/23  0.15                 8/29/22  0.25                 9/10/21  0.43                 8/6/20  5.3                   3/2/18  8.4                   2/1/18  1.1                   5/5/08     Testosterone  244                  10/10/13     Urine culture  No growth        5/12/20     IPSS    QoL  3          0          8/29/22    Past Medical History:   Diagnosis Date    Allergic rhinitis, seasonal     Asthma     Back pain     DDD (degenerative disc disease), cervical     Digestive disorder     Gout attack     Hyperlipidemia     PONV (postoperative nausea and vomiting)     Prostate cancer     Prostate cancer 08/01/2019       Past Surgical History:   Procedure Laterality Date    ARTHROPLASTY OF SHOULDER Left 4/3/2024    Procedure: ARTHROPLASTY, SHOULDER;  Surgeon: Germán Carty MD;  Location: [facility] OR;  Service: Orthopedics;  Laterality: Left;  Sagar Arriaza- RICARDO    BACK SURGERY  01/29/2016    CYSTOSCOPY N/A 12/1/2021    Procedure: CYSTOSCOPY;  Surgeon: Marek Guidry Jr., MD;  Location: [facility] OR;  Service: Urology;  Laterality: N/A;    INGUINAL HERNIA REPAIR         Review of patient's allergies indicates:   Allergen Reactions    Grass pollen-june grass standard Other (See Comments)     Triggers his sinuses    Permethrin      Pt is not sure of this allergy      Statins-hmg-coa reductase inhibitors Other (See Comments)     Myopathy       Medications Reviewed: see MAR    FOCUSED PHYSICAL EXAM:    There were no vitals filed for this visit.  Body mass index is 26.33 kg/m². Weight: 74 kg (163 lb 2.3 oz) Height: 5' 6" (167.6 cm)       General: Alert, cooperative, no distress, appears " stated age  Abdomen: Soft, non-tender, no CVA tenderness, non-distended        LABS:    Recent Results (from the past 2 weeks)   PROSTATE SPECIFIC ANTIGEN, DIAGNOSTIC    Collection Time: 11/21/24 12:16 PM   Result Value Ref Range    PSA Diagnostic 0.07 0.00 - 4.00 ng/mL           Assessment/Diagnosis:    1. Prostate cancer  PROSTATE SPECIFIC ANTIGEN, DIAGNOSTIC      2. Lower urinary tract symptoms (LUTS)        3. Other male erectile dysfunction        4. Unspecified symptoms and signs involving the genitourinary system  tadalafiL (CIALIS) 5 MG tablet      5. Male erectile dysfunction, unspecified  tadalafiL (CIALIS) 5 MG tablet          Plans:    - I spent 30 minutes of the day of this encounter preparing for, treating and managing this patient. Visit today included increased complexity associated with the care of the episodic problem addressed and managing the longitudinal care of the patient due to the serious and/or complex managed problem(s) prostate cancer, lower urinary tract symptoms and erectile dysfunction. Reviewed the NCCN guidelines with patient regarding surveillance after XRT which he completed in 9/2018. We discussed that he will require PSA every 6 months for approximately 5 years and then annually thereafter. Last PSA 0.10.   - Continue Cialis 5 mg PO daily for LUTS and ED. Refills ordered.   - Continue Cialis 20 mg as needed.   - Discontinue Flomax.   - PSA today.   - RTC in 1 year with PSA.

## 2024-12-22 ENCOUNTER — OFFICE VISIT (OUTPATIENT)
Dept: URGENT CARE | Facility: CLINIC | Age: 74
End: 2024-12-22
Payer: MEDICARE

## 2024-12-22 VITALS
DIASTOLIC BLOOD PRESSURE: 76 MMHG | SYSTOLIC BLOOD PRESSURE: 116 MMHG | WEIGHT: 163 LBS | HEART RATE: 76 BPM | RESPIRATION RATE: 18 BRPM | HEIGHT: 66 IN | BODY MASS INDEX: 26.2 KG/M2 | TEMPERATURE: 97 F | OXYGEN SATURATION: 97 %

## 2024-12-22 DIAGNOSIS — B96.89 BACTERIAL SINUSITIS: Primary | ICD-10-CM

## 2024-12-22 DIAGNOSIS — R09.81 NASAL CONGESTION: ICD-10-CM

## 2024-12-22 DIAGNOSIS — J32.9 BACTERIAL SINUSITIS: Primary | ICD-10-CM

## 2024-12-22 DIAGNOSIS — R05.9 COUGH, UNSPECIFIED TYPE: ICD-10-CM

## 2024-12-22 DIAGNOSIS — R09.81 SINUS CONGESTION: ICD-10-CM

## 2024-12-22 LAB
CTP QC/QA: YES
FLUAV AG NPH QL: NEGATIVE
FLUBV AG NPH QL: NEGATIVE
S PYO RRNA THROAT QL PROBE: NEGATIVE
SARS-COV-2 AG RESP QL IA.RAPID: NEGATIVE

## 2024-12-22 PROCEDURE — 87804 INFLUENZA ASSAY W/OPTIC: CPT | Mod: QW,,,

## 2024-12-22 PROCEDURE — 87880 STREP A ASSAY W/OPTIC: CPT | Mod: QW,,,

## 2024-12-22 PROCEDURE — 99214 OFFICE O/P EST MOD 30 MIN: CPT | Mod: S$GLB,,,

## 2024-12-22 PROCEDURE — 87811 SARS-COV-2 COVID19 W/OPTIC: CPT | Mod: QW,S$GLB,,

## 2024-12-22 RX ORDER — PROMETHAZINE HYDROCHLORIDE AND DEXTROMETHORPHAN HYDROBROMIDE 6.25; 15 MG/5ML; MG/5ML
5 SYRUP ORAL EVERY 8 HOURS PRN
Qty: 120 ML | Refills: 0 | Status: SHIPPED | OUTPATIENT
Start: 2024-12-22

## 2024-12-22 RX ORDER — DOXYCYCLINE 100 MG/1
100 CAPSULE ORAL EVERY 12 HOURS
Qty: 20 CAPSULE | Refills: 0 | Status: SHIPPED | OUTPATIENT
Start: 2024-12-22 | End: 2025-01-01

## 2024-12-22 RX ORDER — PREDNISONE 20 MG/1
20 TABLET ORAL DAILY
Qty: 5 TABLET | Refills: 0 | Status: SHIPPED | OUTPATIENT
Start: 2024-12-22 | End: 2024-12-27

## 2024-12-22 NOTE — PROGRESS NOTES
"Subjective:      Patient ID: Shree Heredia is a 74 y.o. male.    Vitals:  height is 5' 6" (1.676 m) and weight is 73.9 kg (163 lb). His oral temperature is 97 °F (36.1 °C). His blood pressure is 116/76 and his pulse is 76. His respiration is 18 and oxygen saturation is 97%.     Chief Complaint: Sinus Problem    Pt states that for the past month he has been having a cough, with sinus pressure and nasal drainage. Pt states that he has been taking otc medication and it has not been helping him very much.     Sinus Problem  This is a new problem. The current episode started 1 to 4 weeks ago. The problem has been gradually worsening since onset. There has been no fever. His pain is at a severity of 0/10. He is experiencing no pain. Associated symptoms include congestion, coughing, headaches, a hoarse voice, sinus pressure and sneezing. Pertinent negatives include no chills, ear pain, shortness of breath, sore throat or swollen glands. Treatments tried: otc sinus. The treatment provided no relief.       Constitution: Negative. Negative for chills.   HENT:  Positive for congestion, postnasal drip, sinus pain and sinus pressure. Negative for ear pain and sore throat.    Neck: neck negative.   Cardiovascular: Negative.    Eyes: Negative.    Respiratory:  Positive for cough. Negative for shortness of breath.    Gastrointestinal: Negative.    Endocrine: negative.   Genitourinary: Negative.    Musculoskeletal: Negative.    Skin: Negative.    Allergic/Immunologic: Negative.  Positive for sneezing.   Neurological:  Positive for headaches.   Hematologic/Lymphatic: Negative.    Psychiatric/Behavioral: Negative.        Objective:     Physical Exam   Constitutional: He is oriented to person, place, and time. He is cooperative. He does not appear ill. No distress.   HENT:   Head: Normocephalic and atraumatic.   Ears:   Right Ear: Tympanic membrane, external ear and ear canal normal.   Left Ear: Tympanic membrane, external ear " and ear canal normal.   Nose: Congestion present. Right sinus exhibits maxillary sinus tenderness and frontal sinus tenderness. Left sinus exhibits maxillary sinus tenderness and frontal sinus tenderness.   Mouth/Throat: Mucous membranes are moist. Posterior oropharyngeal erythema present.   Eyes: Conjunctivae are normal. Pupils are equal, round, and reactive to light. Extraocular movement intact   Neck: Neck supple. No neck rigidity present.   Cardiovascular: Normal rate, regular rhythm, normal heart sounds and normal pulses.   Pulmonary/Chest: Effort normal and breath sounds normal. No respiratory distress. He has no wheezes.   Abdominal: Normal appearance.   Musculoskeletal: Normal range of motion.         General: Normal range of motion.      Cervical back: He exhibits no tenderness.   Neurological: no focal deficit. He is alert, oriented to person, place, and time and at baseline.   Skin: Skin is warm and dry.   Psychiatric: His behavior is normal. Mood, judgment and thought content normal.   Nursing note and vitals reviewed.      Assessment:     1. Bacterial sinusitis    2. Sinus congestion    3. Nasal congestion    4. Cough, unspecified type        Plan:       Bacterial sinusitis  -     predniSONE (DELTASONE) 20 MG tablet; Take 1 tablet (20 mg total) by mouth once daily. for 5 days  Dispense: 5 tablet; Refill: 0  -     doxycycline (VIBRAMYCIN) 100 MG Cap; Take 1 capsule (100 mg total) by mouth every 12 (twelve) hours. for 10 days  Dispense: 20 capsule; Refill: 0    Sinus congestion  -     POCT Influenza A/B  -     POCT rapid strep A  -     SARS Coronavirus 2 Antigen, POCT Manual Read  -     predniSONE (DELTASONE) 20 MG tablet; Take 1 tablet (20 mg total) by mouth once daily. for 5 days  Dispense: 5 tablet; Refill: 0    Nasal congestion  -     predniSONE (DELTASONE) 20 MG tablet; Take 1 tablet (20 mg total) by mouth once daily. for 5 days  Dispense: 5 tablet; Refill: 0    Cough, unspecified type  -      predniSONE (DELTASONE) 20 MG tablet; Take 1 tablet (20 mg total) by mouth once daily. for 5 days  Dispense: 5 tablet; Refill: 0  -     promethazine-dextromethorphan (PROMETHAZINE-DM) 6.25-15 mg/5 mL Syrp; Take 5 mLs by mouth every 8 (eight) hours as needed (cough).  Dispense: 120 mL; Refill: 0

## 2024-12-22 NOTE — PATIENT INSTRUCTIONS
Advised pt to wear sunscreen and long sleeve when outside due to increased risk of sunburn due to doxy.   Advised pt not to drive while taking promethazine     Please return here or go to the Emergency Department for any concerns or worsening of condition.  Please drink plenty of fluids.  Please get plenty of rest.  If you were prescribed antibiotics, please take them to completion.  If you were given wait & see antibiotics, please wait 5-7 days before taking them, and only take them if your symptoms have worsened or not improved.  If you do begin taking the antibiotics, please take them to completion.  If you were given a steroid shot in the clinic and have also been given a prescription for a steroid such as Prednisone or a Medrol Dose Pack, please begin taking them tomorrow.  If you do not have Hypertension or any history of palpitations, it is ok to take over the counter Sudafed or Mucinex D or Allegra-D or Claritin-D or Zyrtec-D.  If you do take one of the above, it is ok to combine that with plain over the counter Mucinex or Allegra or Claritin or Zyrtec.  If for example you are taking Zyrtec -D, you can combine that with Mucinex, but not Mucinex-D.  If you are taking Mucinex-D, you can combine that with plain Allegra or Claritin or Zyrtec.   If you do have Hypertension or palpitations, it is safe to take Coricidin HBP for relief of sinus symptoms.  If not allergic, please take over the counter Tylenol (Acetaminophen) and/or Motrin (Ibuprofen) as directed for control of pain and/or fever.  Please follow up with your primary care doctor or specialist as needed.    If you  smoke, please stop smoking.

## 2025-03-26 DIAGNOSIS — N52.8 OTHER MALE ERECTILE DYSFUNCTION: ICD-10-CM

## 2025-03-26 RX ORDER — TADALAFIL 20 MG/1
20 TABLET ORAL DAILY PRN
Qty: 15 TABLET | Refills: 11 | Status: SHIPPED | OUTPATIENT
Start: 2025-03-26 | End: 2026-03-26

## 2025-04-22 ENCOUNTER — TELEPHONE (OUTPATIENT)
Dept: TRANSPLANT | Facility: CLINIC | Age: 75
End: 2025-04-22
Payer: MEDICARE

## 2025-07-08 RX ORDER — MECLIZINE HCL 12.5 MG 12.5 MG/1
12.5 TABLET ORAL 3 TIMES DAILY PRN
COMMUNITY
End: 2025-07-08 | Stop reason: SDUPTHER

## 2025-07-08 RX ORDER — MECLIZINE HCL 12.5 MG 12.5 MG/1
12.5 TABLET ORAL 3 TIMES DAILY PRN
Qty: 90 TABLET | Refills: 0 | Status: SHIPPED | OUTPATIENT
Start: 2025-07-08

## 2025-07-08 RX ORDER — FLUTICASONE PROPIONATE 50 MCG
1 SPRAY, SUSPENSION (ML) NASAL DAILY
COMMUNITY
End: 2025-07-08 | Stop reason: SDUPTHER

## 2025-07-08 RX ORDER — FLUTICASONE PROPIONATE 50 MCG
1 SPRAY, SUSPENSION (ML) NASAL DAILY
Qty: 11.1 ML | Refills: 0 | Status: SHIPPED | OUTPATIENT
Start: 2025-07-08

## (undated) DEVICE — SUT STRATAFIX SPIRAL VIOLET

## (undated) DEVICE — BLADE TONGUE DEPRESSOR STRL

## (undated) DEVICE — MIXER BONE CEMENT

## (undated) DEVICE — SCRUB 10% POVIDONE IODINE 4OZ

## (undated) DEVICE — SET CYSTO IRRIGATION UNIV SPIK

## (undated) DEVICE — SUT MONOCRYL PLUS UD 3-0 27

## (undated) DEVICE — ELECTRODE REM PLYHSV RETURN 9

## (undated) DEVICE — TOGA FLYTE PEEL AWAY XLARGE

## (undated) DEVICE — DRAPE INCISE IOBAN 2 13X13IN

## (undated) DEVICE — TRAY CATH 1-LYR URIMTR 16FR

## (undated) DEVICE — SYR 50ML CATH TIP

## (undated) DEVICE — SUT 1 36IN COATED VICRYL UN

## (undated) DEVICE — HANDPIECE INTERPULSE SET

## (undated) DEVICE — DRAPE STERI INSTRUMENT 1018

## (undated) DEVICE — DRAPE SPLIT ADHESIVE 77X120IN

## (undated) DEVICE — SYS CLSR DERMABOND PRINEO 22CM

## (undated) DEVICE — APPLICATOR CHLORAPREP ORN 26ML

## (undated) DEVICE — PAD ABDOMINAL STERILE 5X9IN

## (undated) DEVICE — SHEET DRAPE MEDIUM

## (undated) DEVICE — SPONGE GAUZE 16PLY 4X4

## (undated) DEVICE — BNDG COFLEX FOAM LF2 ST 4X5YD

## (undated) DEVICE — STOCKINETTE STRL 9X48IN

## (undated) DEVICE — GLOVE SURG ULTRA TOUCH 7.5

## (undated) DEVICE — SYR 10CC LUER LOCK

## (undated) DEVICE — DRESSING ADAPTIC N ADH 3X8IN

## (undated) DEVICE — ELECTRODE BLADE E-Z CLEAN 4IN

## (undated) DEVICE — DRAPE SURG U SLOT 47X70 LONG

## (undated) DEVICE — DRAPE THREE-QUARTER 53X77IN

## (undated) DEVICE — SOL NACL IRR 3000ML

## (undated) DEVICE — GLOVE BIOGEL SKINSENSE PI 8.5

## (undated) DEVICE — BLADE SAW SGTTL 85X25X1.19MM

## (undated) DEVICE — WATER STERILE INJ 500ML BAG

## (undated) DEVICE — TRAY GENERAL SURGERY SMH

## (undated) DEVICE — TAPE ADH MEDIPORE 4 X 10YDS

## (undated) DEVICE — GOWN X-LG STERILE BACK

## (undated) DEVICE — SOL NACL IRR 1000ML BTL

## (undated) DEVICE — GLOVE BIOGEL PIMICRO INDIC 8.5

## (undated) DEVICE — SLING ULTRASLING II LG +13IN

## (undated) DEVICE — SUT FIBERWIRE 2 38 IN TAPER

## (undated) DEVICE — SOL IRRI STRL WATER 1000ML